# Patient Record
Sex: FEMALE | Race: BLACK OR AFRICAN AMERICAN
[De-identification: names, ages, dates, MRNs, and addresses within clinical notes are randomized per-mention and may not be internally consistent; named-entity substitution may affect disease eponyms.]

---

## 2017-01-18 ENCOUNTER — HOSPITAL ENCOUNTER (INPATIENT)
Dept: HOSPITAL 61 - ER | Age: 52
LOS: 1 days | Discharge: HOME | DRG: 640 | End: 2017-01-19
Attending: INTERNAL MEDICINE | Admitting: INTERNAL MEDICINE
Payer: MEDICARE

## 2017-01-18 VITALS — DIASTOLIC BLOOD PRESSURE: 79 MMHG | SYSTOLIC BLOOD PRESSURE: 158 MMHG

## 2017-01-18 VITALS — SYSTOLIC BLOOD PRESSURE: 135 MMHG | DIASTOLIC BLOOD PRESSURE: 70 MMHG

## 2017-01-18 VITALS — DIASTOLIC BLOOD PRESSURE: 71 MMHG | SYSTOLIC BLOOD PRESSURE: 122 MMHG

## 2017-01-18 VITALS — SYSTOLIC BLOOD PRESSURE: 160 MMHG | DIASTOLIC BLOOD PRESSURE: 70 MMHG

## 2017-01-18 VITALS — DIASTOLIC BLOOD PRESSURE: 70 MMHG | SYSTOLIC BLOOD PRESSURE: 174 MMHG

## 2017-01-18 VITALS — SYSTOLIC BLOOD PRESSURE: 170 MMHG | DIASTOLIC BLOOD PRESSURE: 70 MMHG

## 2017-01-18 VITALS — DIASTOLIC BLOOD PRESSURE: 70 MMHG | SYSTOLIC BLOOD PRESSURE: 166 MMHG

## 2017-01-18 VITALS — SYSTOLIC BLOOD PRESSURE: 178 MMHG | DIASTOLIC BLOOD PRESSURE: 72 MMHG

## 2017-01-18 VITALS — SYSTOLIC BLOOD PRESSURE: 154 MMHG | DIASTOLIC BLOOD PRESSURE: 79 MMHG

## 2017-01-18 VITALS — SYSTOLIC BLOOD PRESSURE: 130 MMHG | DIASTOLIC BLOOD PRESSURE: 60 MMHG

## 2017-01-18 VITALS — SYSTOLIC BLOOD PRESSURE: 122 MMHG | DIASTOLIC BLOOD PRESSURE: 60 MMHG

## 2017-01-18 VITALS — SYSTOLIC BLOOD PRESSURE: 98 MMHG | DIASTOLIC BLOOD PRESSURE: 55 MMHG

## 2017-01-18 VITALS — DIASTOLIC BLOOD PRESSURE: 70 MMHG | SYSTOLIC BLOOD PRESSURE: 150 MMHG

## 2017-01-18 VITALS — BODY MASS INDEX: 27.42 KG/M2 | WEIGHT: 145.25 LBS | HEIGHT: 61 IN

## 2017-01-18 VITALS — DIASTOLIC BLOOD PRESSURE: 69 MMHG | SYSTOLIC BLOOD PRESSURE: 113 MMHG

## 2017-01-18 VITALS — DIASTOLIC BLOOD PRESSURE: 74 MMHG | SYSTOLIC BLOOD PRESSURE: 126 MMHG

## 2017-01-18 VITALS — DIASTOLIC BLOOD PRESSURE: 72 MMHG | SYSTOLIC BLOOD PRESSURE: 133 MMHG

## 2017-01-18 VITALS — SYSTOLIC BLOOD PRESSURE: 105 MMHG | DIASTOLIC BLOOD PRESSURE: 58 MMHG

## 2017-01-18 VITALS — SYSTOLIC BLOOD PRESSURE: 117 MMHG | DIASTOLIC BLOOD PRESSURE: 59 MMHG

## 2017-01-18 VITALS — DIASTOLIC BLOOD PRESSURE: 61 MMHG | SYSTOLIC BLOOD PRESSURE: 116 MMHG

## 2017-01-18 VITALS — SYSTOLIC BLOOD PRESSURE: 137 MMHG | DIASTOLIC BLOOD PRESSURE: 66 MMHG

## 2017-01-18 VITALS — SYSTOLIC BLOOD PRESSURE: 181 MMHG | DIASTOLIC BLOOD PRESSURE: 75 MMHG

## 2017-01-18 VITALS — SYSTOLIC BLOOD PRESSURE: 123 MMHG | DIASTOLIC BLOOD PRESSURE: 60 MMHG

## 2017-01-18 DIAGNOSIS — E87.2: ICD-10-CM

## 2017-01-18 DIAGNOSIS — Z82.49: ICD-10-CM

## 2017-01-18 DIAGNOSIS — R00.1: ICD-10-CM

## 2017-01-18 DIAGNOSIS — D64.9: ICD-10-CM

## 2017-01-18 DIAGNOSIS — M06.9: ICD-10-CM

## 2017-01-18 DIAGNOSIS — F41.9: ICD-10-CM

## 2017-01-18 DIAGNOSIS — Z99.2: ICD-10-CM

## 2017-01-18 DIAGNOSIS — I50.9: ICD-10-CM

## 2017-01-18 DIAGNOSIS — I25.10: ICD-10-CM

## 2017-01-18 DIAGNOSIS — I13.2: ICD-10-CM

## 2017-01-18 DIAGNOSIS — E78.00: ICD-10-CM

## 2017-01-18 DIAGNOSIS — E87.5: Primary | ICD-10-CM

## 2017-01-18 DIAGNOSIS — N18.6: ICD-10-CM

## 2017-01-18 DIAGNOSIS — E78.5: ICD-10-CM

## 2017-01-18 DIAGNOSIS — E83.39: ICD-10-CM

## 2017-01-18 DIAGNOSIS — F32.9: ICD-10-CM

## 2017-01-18 DIAGNOSIS — K21.9: ICD-10-CM

## 2017-01-18 LAB
ALBUMIN SERPL-MCNC: 3.1 G/DL (ref 3.4–5)
ALBUMIN/GLOB SERPL: 0.6 {RATIO} (ref 1–1.7)
ALP SERPL-CCNC: 93 U/L (ref 46–116)
ALT SERPL-CCNC: 139 U/L (ref 14–59)
ANION GAP SERPL CALC-SCNC: 15 MMOL/L (ref 6–14)
ANION GAP SERPL CALC-SCNC: 17 MMOL/L (ref 6–14)
AST SERPL-CCNC: 194 U/L (ref 15–37)
BASOPHILS # BLD AUTO: 0 X10^3/UL (ref 0–0.2)
BASOPHILS NFR BLD: 1 % (ref 0–3)
BILIRUB SERPL-MCNC: 0.9 MG/DL (ref 0.2–1)
BUN SERPL-MCNC: 145 MG/DL (ref 7–20)
BUN SERPL-MCNC: 45 MG/DL (ref 7–20)
BUN/CREAT SERPL: 10 (ref 6–20)
CALCIUM SERPL-MCNC: 9.1 MG/DL (ref 8.5–10.1)
CALCIUM SERPL-MCNC: 9.3 MG/DL (ref 8.5–10.1)
CHLORIDE SERPL-SCNC: 92 MMOL/L (ref 98–107)
CHLORIDE SERPL-SCNC: 99 MMOL/L (ref 98–107)
CK SERPL-CCNC: 49 U/L (ref 26–192)
CKMB INDEX: 2.4 % (ref 0–4)
CKMB MASS: 1.2 NG/ML (ref 0–3.6)
CO2 SERPL-SCNC: 20 MMOL/L (ref 21–32)
CO2 SERPL-SCNC: 32 MMOL/L (ref 21–32)
CREAT SERPL-MCNC: 14.4 MG/DL (ref 0.6–1)
CREAT SERPL-MCNC: 5.6 MG/DL (ref 0.6–1)
EOSINOPHIL NFR BLD: 1 % (ref 0–3)
ERYTHROCYTE [DISTWIDTH] IN BLOOD BY AUTOMATED COUNT: 14.2 % (ref 11.5–14.5)
GFR SERPLBLD BASED ON 1.73 SQ M-ARVRAT: 3.2 ML/MIN
GFR SERPLBLD BASED ON 1.73 SQ M-ARVRAT: 9.6 ML/MIN
GLOBULIN SER-MCNC: 5.3 G/DL (ref 2.2–3.8)
GLUCOSE SERPL-MCNC: 137 MG/DL (ref 70–99)
GLUCOSE SERPL-MCNC: 66 MG/DL (ref 70–99)
HCT VFR BLD CALC: 36 % (ref 36–47)
HGB BLD-MCNC: 11.5 G/DL (ref 12–15.5)
LYMPHOCYTES # BLD: 0.9 X10^3/UL (ref 1–4.8)
LYMPHOCYTES NFR BLD AUTO: 16 % (ref 24–48)
MAGNESIUM SERPL-MCNC: 2.9 MG/DL (ref 1.8–2.4)
MCH RBC QN AUTO: 31 PG (ref 25–35)
MCHC RBC AUTO-ENTMCNC: 32 G/DL (ref 31–37)
MCV RBC AUTO: 95 FL (ref 79–100)
MONOCYTES NFR BLD: 6 % (ref 0–9)
NEUTROPHILS NFR BLD AUTO: 76 % (ref 31–73)
OBC FLU: (no result)
PLATELET # BLD AUTO: 342 X10^3/UL (ref 140–400)
POTASSIUM SERPL-SCNC: 3 MMOL/L (ref 3.5–5.1)
POTASSIUM SERPL-SCNC: 9.2 MMOL/L (ref 3.5–5.1)
PROT SERPL-MCNC: 8.4 G/DL (ref 6.4–8.2)
RBC # BLD AUTO: 3.77 X10^6/UL (ref 3.5–5.4)
SODIUM SERPL-SCNC: 136 MMOL/L (ref 136–145)
SODIUM SERPL-SCNC: 139 MMOL/L (ref 136–145)
WBC # BLD AUTO: 5.7 X10^3/UL (ref 4–11)

## 2017-01-18 PROCEDURE — 80069 RENAL FUNCTION PANEL: CPT

## 2017-01-18 PROCEDURE — 80048 BASIC METABOLIC PNL TOTAL CA: CPT

## 2017-01-18 PROCEDURE — 85027 COMPLETE CBC AUTOMATED: CPT

## 2017-01-18 PROCEDURE — 90732 PPSV23 VACC 2 YRS+ SUBQ/IM: CPT

## 2017-01-18 PROCEDURE — 87804 INFLUENZA ASSAY W/OPTIC: CPT

## 2017-01-18 PROCEDURE — 71010: CPT

## 2017-01-18 PROCEDURE — 36415 COLL VENOUS BLD VENIPUNCTURE: CPT

## 2017-01-18 PROCEDURE — 84484 ASSAY OF TROPONIN QUANT: CPT

## 2017-01-18 PROCEDURE — 83735 ASSAY OF MAGNESIUM: CPT

## 2017-01-18 PROCEDURE — 80053 COMPREHEN METABOLIC PANEL: CPT

## 2017-01-18 PROCEDURE — 82553 CREATINE MB FRACTION: CPT

## 2017-01-18 PROCEDURE — 93005 ELECTROCARDIOGRAM TRACING: CPT

## 2017-01-18 PROCEDURE — 83880 ASSAY OF NATRIURETIC PEPTIDE: CPT

## 2017-01-18 PROCEDURE — 87641 MR-STAPH DNA AMP PROBE: CPT

## 2017-01-18 RX ADMIN — AMLODIPINE BESYLATE SCH MG: 10 TABLET ORAL at 09:49

## 2017-01-18 RX ADMIN — CALCIUM ACETATE SCH MG: 667 CAPSULE ORAL at 12:58

## 2017-01-18 RX ADMIN — CITALOPRAM HYDROBROMIDE SCH MG: 20 TABLET ORAL at 20:38

## 2017-01-18 RX ADMIN — CITALOPRAM HYDROBROMIDE SCH MG: 20 TABLET ORAL at 09:50

## 2017-01-18 RX ADMIN — PANTOPRAZOLE SODIUM SCH MG: 40 TABLET, DELAYED RELEASE ORAL at 09:50

## 2017-01-18 RX ADMIN — CLONIDINE HYDROCHLORIDE SCH MG: 0.3 TABLET ORAL at 20:38

## 2017-01-18 RX ADMIN — CALCIUM ACETATE SCH MG: 667 CAPSULE ORAL at 09:45

## 2017-01-18 RX ADMIN — CLOPIDOGREL BISULFATE SCH MG: 75 TABLET ORAL at 09:50

## 2017-01-18 RX ADMIN — ASPIRIN 81 MG CHEWABLE TABLET SCH MG: 81 TABLET CHEWABLE at 09:51

## 2017-01-18 RX ADMIN — GABAPENTIN SCH MG: 100 CAPSULE ORAL at 20:38

## 2017-01-18 RX ADMIN — CLONIDINE HYDROCHLORIDE SCH MG: 0.3 TABLET ORAL at 09:48

## 2017-01-18 RX ADMIN — ISOSORBIDE MONONITRATE SCH MG: 60 TABLET, EXTENDED RELEASE ORAL at 09:49

## 2017-01-18 RX ADMIN — LISINOPRIL SCH MG: 20 TABLET ORAL at 20:38

## 2017-01-18 RX ADMIN — LISINOPRIL SCH MG: 20 TABLET ORAL at 14:27

## 2017-01-18 RX ADMIN — CLONIDINE HYDROCHLORIDE SCH MG: 0.3 TABLET ORAL at 14:27

## 2017-01-18 RX ADMIN — CALCIUM ACETATE SCH MG: 667 CAPSULE ORAL at 18:35

## 2017-01-18 RX ADMIN — GABAPENTIN SCH MG: 100 CAPSULE ORAL at 09:51

## 2017-01-18 RX ADMIN — LISINOPRIL SCH MG: 20 TABLET ORAL at 09:50

## 2017-01-18 NOTE — PHYS DOC
Past Medical History


Past Medical History:  Anxiety, CAD, CHF, Depression, GERD, High Cholesterol, 

Hypertension


Additional Past Medical Histor:  HD, constipation


Past Surgical History:  Other


Additional Past Surgical Histo:  fistula to right arm


Alcohol Use:  None


Drug Use:  None





Adult General


Chief Complaint


Chief Complaint:  DIZZY/LIGHT HEADED





HPI


HPI


Patient is a 51  year old female who presents with dizziness and 

lightheadedness. The patient states that her symptoms started earlier today. 

Patient states that she has become severely lightheaded and is currently unable 

to stand up and walk because of her symptoms. Patient is also having associated 

nausea and vomiting. Patient has history of hypertension, coronary artery 

disease, and congestive heart failure. Patient also has history of end-stage 

renal failure and is currently on dialysis. Patient states that she last went 

to dialysis 4 days ago. Patient normally goes to dialysis on Monday, Wednesday, 

and Friday. Patient denies any fevers. Patient is currently on lisinopril and 

clonidine for her blood pressure. Patient states that her blood pressure has 

been running lower than usual over the past 24 hours. Patient states that she 

is having mild chest pain at this moment but that this was not present at 

symptom onset.





Review of Systems


Review of Systems





Constitutional: Lightheadedness, generalized weakness, Denies fever or chills []


Eyes: Denies change in visual acuity, redness, or eye pain []


HENT: Denies nasal congestion or sore throat []


Respiratory: Denies cough or shortness of breath []


Cardiovascular: Chest pain []


GI: Nausea, vomiting, denies abdominal pain bloody stools or diarrhea []


: Denies dysuria or hematuria []


Musculoskeletal: Denies back pain or joint pain []


Integument: Denies rash or skin lesions []


Neurologic: Denies headache, focal weakness or sensory changes []


Endocrine: Denies polyuria or polydipsia []





Current Medications


Current Medications





 Current Medications








 Medications


  (Trade)  Dose


 Ordered  Sig/Hugo  Start Time


 Stop Time Status Last Admin


Dose Admin


 


 Calcium Chloride  333 mg  1X  ONCE  1/18/17 01:00


 1/18/17 01:01 DC 1/18/17 01:09


333 MG


 


 Dextrose  25 gm  1X  ONCE  1/18/17 01:45


 1/18/17 01:46 DC 1/18/17 01:47


25 GM


 


 Insulin Human


 Regular


  (Novolin R Vial)  10 unit  1X  ONCE  1/18/17 01:45


 1/18/17 01:46 DC 1/18/17 01:56


10 UNIT


 


 Ondansetron HCl


  (Zofran)  4 mg  1X  ONCE  1/18/17 01:45


 1/18/17 01:46 DC 1/18/17 00:53


4 MG


 


 Ondansetron HCl 4


 mg  4 mg  STK-MED ONCE  1/18/17 00:52


 1/18/17 00:53 DC  


 


 


 Sodium


 Polystyrene


 Sulfonate


  (Kayexalate)  30 gm  1X  ONCE  1/18/17 01:45


 1/18/17 01:46 DC 1/18/17 01:47


30 GM


 


 Sodium Bicarbonate  50 meq  1X  ONCE  1/18/17 01:45


 1/18/17 01:46 DC 1/18/17 01:47


50 MEQ


 


 Sodium Chloride


  (Iv Sodium


 Chloride 0.9%


 1000ml Bag)  1,000 ml @ 


 60 mls/hr  [id number]  1/18/17 01:00


 1/18/17 17:39  1/18/17 01:01


60 MLS/HR











Allergies


Allergies





 Allergies








Coded Allergies Type Severity Reaction Last Updated Verified


 


  No Known Drug Allergies    10/20/16 No











Physical Exam


Physical Exam





Constitutional: Alert, afebrile, appears ill. []


HENT: Normocephalic, atraumatic, bilateral external ears normal, oropharynx 

moist, no oral exudates, nose normal. []


Eyes: PERRLA, EOMI, conjunctiva normal, no discharge. [] 


Neck: Normal range of motion, no tenderness, supple, no stridor. [] 


Cardiovascular: Bradycardia, regular rhythm, no murmur []


Lungs & Thorax:  Bilateral breath sounds clear to auscultation []


Abdomen: Bowel sounds normal, soft, no tenderness, no masses, no pulsatile 

masses. [] 


Skin: Warm, dry, no erythema, no rash. [] 


Back: No tenderness, no CVA tenderness. [] 


Extremities: No tenderness, no cyanosis, no clubbing, ROM intact, trace edema 

bilaterally. [] 


Neurologic: Alert and oriented X 3, normal motor function, normal sensory 

function, no focal deficits noted. []





Current Patient Data


Vital Signs





 Vital Signs








  Date Time  Temp Pulse Resp B/P Pulse Ox O2 Delivery O2 Flow Rate FiO2


 


1/18/17 00:36 97.6 61 16 106/55 96 Room Air  





 97.6       








Lab Values





 Laboratory Tests








Test


  1/18/17


01:12


 


White Blood Count


  5.7x10^3/uL


(4.0-11.0)


 


Red Blood Count


  3.77x10^6/uL


(3.50-5.40)


 


Hemoglobin


  11.5g/dL


(12.0-15.5)  L


 


Hematocrit


  36.0%


(36.0-47.0)


 


Mean Corpuscular Volume 95fL ()  


 


Mean Corpuscular Hemoglobin 31pg (25-35)  


 


Mean Corpuscular Hemoglobin


Concent 32g/dL (31-37)


 


 


Red Cell Distribution Width


  14.2%


(11.5-14.5)


 


Platelet Count


  342x10^3/uL


(140-400)


 


Neutrophils (%) (Auto) 76% (31-73)  H


 


Lymphocytes (%) (Auto) 16% (24-48)  L


 


Monocytes (%) (Auto) 6% (0-9)  


 


Eosinophils (%) (Auto) 1% (0-3)  


 


Basophils (%) (Auto) 1% (0-3)  


 


Neutrophils # (Auto)


  4.3x10^3uL


(1.8-7.7)


 


Lymphocytes # (Auto)


  0.9x10^3/uL


(1.0-4.8)  L


 


Monocytes # (Auto)


  0.3x10^3/uL


(0.0-1.1)


 


Eosinophils # (Auto)


  0.1x10^3/uL


(0.0-0.7)


 


Basophils # (Auto)


  0.0x10^3/uL


(0.0-0.2)


 


Sodium Level


  136mmol/L


(136-145)


 


Potassium Level


  9.2mmol/L


(3.5-5.1)  *H


 


Chloride Level


  99mmol/L


()


 


Carbon Dioxide Level


  20mmol/L


(21-32)  L


 


Anion Gap 17 (6-14)  H


 


Blood Urea Nitrogen


  145mg/dL


(7-20)  H


 


Creatinine


  14.4mg/dL


(0.6-1.0)  H


 


Estimated GFR


(Cockcroft-Gault) 3.2  


 


 


BUN/Creatinine Ratio 10 (6-20)  


 


Glucose Level


  137mg/dL


(70-99)  H


 


Calcium Level


  9.3mg/dL


(8.5-10.1)


 


Magnesium Level


  2.9mg/dL


(1.8-2.4)  H


 


Total Bilirubin


  0.9mg/dL


(0.2-1.0)


 


Aspartate Amino Transferase


(AST) 194U/L (15-37)


H


 


Alanine Aminotransferase (ALT)


  139U/L (14-59)


H


 


Alkaline Phosphatase


  93U/L ()


 


 


Creatine Kinase


  49U/L ()


 


 


Creatine Kinase MB (Mass)


  1.2ng/mL


(0.0-3.6)


 


Creatine Kinase MB Relative


Index 2.4% (0-4)  


 


 


Troponin I Quantitative


  < 0.017ng/mL


(0.000-0.055)


 


NT-Pro-B-Type Natriuretic


Peptide 29970ee/mL


(0-124)  H


 


Total Protein


  8.4g/dL


(6.4-8.2)  H


 


Albumin


  3.1g/dL


(3.4-5.0)  L


 


Albumin/Globulin Ratio


  0.6 (1.0-1.7)


L





 Laboratory Tests


1/18/17 01:12








 Laboratory Tests


1/18/17 01:12











EKG


EKG


Interpreted by me: Heart rate 45, junctional rhythm, peaked T waves present, no 

acute ST elevations or depressions []





Radiology/Procedures


Radiology/Procedures


One view AP chest x-ray interpreted by me: Pulmonary vascular congestion, small 

bilateral pleural effusions, cardiomegaly []





Course & Med Decision Making


Course & Med Decision Making


Pertinent Labs and Imaging studies reviewed. (See chart for details)





Patient appears acutely ill and patient's EKG is concerning for possible 

hyperkalemia given patient's history of end-stage renal disease and missing 

dialysis. For this reason the patient was given calcium chloride to help 

stabilize her myocardium. Upon confirmation of patient's hyperkalemia, the 

patient was also given IV sodium bicarbonate, insulin, D50, and oral 

Kayexalate. I contacted Dr. León who accepted care patient in hospital. I 

also spoke with Dr. Cronin of nephrology who will make plans to have patient 

received dialysis emergently tonight. Patient admitted to ICU.





Dragon Disclaimer


Dragon Disclaimer


This electronic medical record was generated, in whole or in part, using a 

voice recognition dictation system.





Departure


Departure


Impression:  


 Primary Impression:  


 Hyperkalemia


 Additional Impressions:  


 ESRD (end stage renal disease)


 Bradycardia


Disposition:  09 ADMITTED AS INPATIENT


Admitting Physician:  Nara León


Condition:  GUARDED


Referrals:  


ANEESH BRISCOE (PCP)





Problem Qualifiers








AVA BOOKER MD Jan 18, 2017 01:00

## 2017-01-18 NOTE — HP
ADMIT DATE:  01/18/2017



LOCATION:  103.



REASON FOR ADMISSION TO THE HOSPITAL:  Hyperkalemia, end-stage renal disease on

hemodialysis.  The patient missed dialysis on Monday.



HISTORY OF PRESENT ILLNESS:  The patient is a 51-year-old female, patient of Dr. Boyer.  She has hemodialysis for couple of years, goes to dialysis Monday,

Wednesday and Friday at dialysis ctr at  [facility].  She and the staff had 
some problem

on Monday and she was mad at the staff and she did not do her dialysis and then

she came in to the Emergency Room and complaints of lightheaded and dizziness

and she was bradycardic with junctional rhythm and she has peaked T-waves on the

EKG.  Potassium was high at 9.3.  The patient was given Kayexalate and Renal was

consulted and emergency dialysis was done and potassium is 3.0 after dialysis.



PAST MEDICAL HISTORY:  History of congestive heart failure, depression, GERD,

cholesterol, hypertension, renal failure, and hemodialysis.



PAST SURGICAL HISTORY:  Had a fistula in the right arm.  Denies other major

surgeries.



ALLERGIES:  No known drug allergies.



MEDICATIONS AT HOME:  The patient is on Flexeril 5 mg twice a day, folic acid 1

daily, loratadine 10 mg daily, Zofran for nausea 8 mg, amlodipine 10 mg daily,

aspirin 81 mg daily, atorvastatin 20 mg daily, calcium acetate 667 mg 3 times

daily, citalopram 20 mg daily, clonidine 0.3 three times a day, Plavix 75 mg

daily, gabapentin 100 mg 3 times daily, hydrocodone 1 q. 6, isosorbide 60 mg

daily, lisinopril 20 mg 3 times daily, Protonix 40 mg daily, senna 1 daily, and

B complex one daily.



FAMILY HISTORY:  Positive for hypertension, heart disease, kidney problems.



SOCIAL HISTORY:  The patient denies history of smoking, alcohol or drug abuse.



REVIEW OF SYSTEMS:

CARDIAC:  No chest pain.

LUNGS:  Some short of breath.

GASTROINTESTINAL:  Has some nausea, dizziness.  Rest of the 14-system was

reviewed and negative.



PHYSICAL EXAMINATION:

GENERAL:  The patient is not in any distress.  Pleasant today.

VITAL SIGNS:  Temperature 97, pulse 61, respirations 16, blood pressure 106/55,

and 96 on room air.

HEENT:  Head is atraumatic.  Pupils equal.  Oral cavity:  No congestion.

NECK:  Supple.  Thyroid not enlarged.  JVD not elevated.

CHEST:  Symmetrical.

CARDIOVASCULAR:  S1, S2.

LUNGS:  Clear to auscultation.

ABDOMEN:  Soft, bowel sounds present, no mass palpable.

EXTERNAL GENITALIA:  No Serrato.

RECTAL:  Deferred.

EXTREMITIES:  No calf tenderness, no edema.  Pulses 1+.

NEUROLOGIC:  Cranial nerves intact.  Power 5/5 in all extremities.  The patient

has AV shunt in the right upper extremity and thrill is present.  Neurologic

exam is normal.

SKIN:  Normal.



LABORATORY DATA:  Shows a white count of 5.7, hemoglobin 11.5, platelets 334. 

Electrolytes show sodium 136, potassium 9.2, chloride 99, bicarbonate 20, BUN

145, creatinine 4.4, glucose 93.  BNP was 18,037.  Chest x-ray, cardiomegaly. 

EKG shows a junctional rhythm with peaked T waves.



FINAL IMPRESSION:

1.  Hyperkalemia, acute.

2.  End-stage renal disease on Dialysis.

3.  Bradycardia with junctional rhythm secondary to hyperkalemia.

4.  Hypertension.

5.  History of congestive heart failure, sees Cardiology at , Dr. Arauz.

6.  Hypertension.

7.  Hyperlipidemia.



PLAN:  At this time, admit to Hospital Emergency dialysis, was given Kayexalate

and heart rate is 70, normal sinus rhythm on the monitor, repeat EKG and see how

she does.  I spoke with renal should be able to get dialyzed again tomorrow,

able to discharge and the then do patient, Monday, Wednesday, and Friday. 

Nutrition consult for renal diet.

 



______________________________

KAROLINA ENRIQUE MD



DR:  ANDREW/argentina  JOB#:  [id number] / [id number]

DD:  01/18/2017 09:29  DT:  01/18/2017 11:39

NAS

## 2017-01-18 NOTE — ACF
Admission Forms Criteria


 HYPONATREMIA; HYPERNATREMIA; HYPOKALEMIA; 


 HYPERKALEMIA; HYPOCALCEMIA; HYPERCALCEMIA





Clinical Indications for Inpatient Care





                                                            (Place 'X' for any 

and all applicable criteria):





Ongoing inpatient care may be indicated for ANY ONE of the following [G](1)(2)(3

)(5):





[ ]I.    Hyponatremia with ANY ONE of the following:


         [ ]a)  Sodium less than 130 mEq/L (mmol/L) (new) (6)(22)


         [ ]b)  Sodium less than 135 mEq/L (mmol/L) with ANY ONE of the 

following:


                [ ]i)    Severe medical etiology requiring inpatient management 

(eg, heart failure, hypovolemia)


                [ ]ii)   Altered mental status     


                [ ]iii)  Seizures


[ ]II.    Hypernatremia with ANY ONE of the following:


          [ ]a) Sodium greater than 155 mEq/L (mmol/L)


          [ ]b) Sodium greater than 150 mEq/L (mmol/L) with ANY ONE of the 

following:


                [ ] i)   Altered mental status 


                [ ]ii)   Seizures


                [ ]iii)  Severe medical etiology (eg, hypovolemia, diabetes 

insipidus)


                [ ]iv)  Severe weakness


                [ ]v)   Severe medical etiology (eg, hemolysis, infection, drug 

overdose) 


[ ]III.  Hypokalemia with ANY ONE of the following:


        [ ]a)  Potassium less than 2.5 mEq/L (mmol/L) despite outpatient and 

emergency treatment 


        [ ]b)  Potassium less than 3.0 mEq/L (mmol/L) with ANY ONE of the 

following:


                [ ]i)    Weakness


                [ ]ii)   Cardiac abnormality (eg, arrhythmia, conduction 

disturbance)


                [ ]iii)  Cardiac ischemia 


                [ ]iv)   Ileus


                [ ]v)   Ongoing medical cause requiring inpatient management. (

e.g., acute renal wasting, SIADH)


                [ ]vi)  Other severe symptoms      


[X] IV. Hyperkalemia with ANY ONE of the following:


         [X]a)  Potassium greater than 6.5 mEq/L (mmol/L)


         [ ]b)  Potassium greater than 5 mEq/L (mmol/L) with  ANY ONE of the 

following:


                [ ]i)    Severe ECG findings [H]


                [ ]ii)   Acute worsening of renal failure (creatinine greater 

than 2.5 mg/dL (221 micromoles/L) 


                        or significant elevation for age and size)


[ ] V.  Hypocalcemia with ANY ONE of the following:


         [ ]a)  Calcium less than 7 mg/dL (1.75 mmol/L) despite outpatient and 

emergency treatment(19)


         [ ]b)  Calcium less than 8 mg/dL (2 mmol/L) with significant symptoms 

or findings; examples include:


                [ ]i)     Cardiac abnormality (eg, arrhythmia or conduction 

disturbance)


                [ ]ii)    Altered mental status 


                [ ]iii)   Seizures


                [ ]iv)   Breathing difficulty 


                [ ]v)    Muscle spasms


[ ]VI. Hypercalcemia with ANY ONE of the following:      


        [ ]a)  Calcium greater than 14 mg/dL (3.5 mmol/L)


        [ ]b)  Calcium greater than 12 mg/dL (3 mmol/L) with ANY ONE of the 

following:


                [ ]i) Significant dehydration or hypovolemia as indicated by 

ANY ONE of the following(2):


                      [ ]1. Clinically significant dehydration as indicated by 

ANY ONE of the following:   


                             [ ]A. Acute loss of weight from baseline (5% of 

body weight in adults, 9% in pediatric patients)


                             [ ]B. Hemodynamic instability


                             [ ]C. Acute renal failure


                             [ ]D. Serum sodium greater than 150 mEq/L (mmol/L)

               


                      [ ]2) Dehydration that is persistent indicated by ALL of 

the following:


                             [ ]A. Oral rehydration therapy not tolerated or 

insufficient to adequately correct dehydration


                             [ ]B. Appropriate intravenous treatment (eg, fluids

) does not readily correct dehydration ie, after 12 to 24 hours of treatment)


                [ ]ii) Significant symptoms or findings; examples include: 


                       [ ]1) Altered mental status


                       [ ]2) Cardiac abnormality (eg, arrhythmia, conduction 

disturbance)     


                       [ ]3) Cardiac abnormality (eg, arrhythmia, conduction 

disturbance)








The original [facility] content created by [facility] has been revised. 


The portions of  the content which have been revised are identified through the 

use of italic text or in bold, and [facility] 


has neither reviewed nor approved the modified material. All other unmodified 

content is copyright  [facility]    





Please see references footnoted in the original [facility] edition 

2016


Admission Criteria Met?:  Yes








JENY YOO Jan 18, 2017 04:02

## 2017-01-18 NOTE — EKG
[facility]

               [address] [place] [address]

Test Date:    2017               Test Time:    00:39:57

Pat Name:     ISATU WHEELER           Department:   

Patient ID:   [id number]           Room:         103 1

Gender:       F                        Technician:   

:          1965               Requested By: AVA BOOKER

Order Number: [id number]            Reading MD:   iMtali Hammer

                                 Measurements

Intervals                              Axis          

Rate:         45                       P:            

UT:                                    QRS:          -67

QRSD:         120                      T:            57

QT:           462                                    

QTc:          402                                    

                           Interpretive Statements

IRREGULAR RHYTHM, NO P-WAVE FOUN

LEFT ANTERIOR FASCICULAR BLOCK

INCOMPLETE RIGHT BUNDLE BRANCH BLOCK

CONSIDER RIGHT VENTRICULAR HYPERTROPHY

ABNORMAL ECG



Electronically Signed On 2017 0:27:08 CST by Mitali Hammer

## 2017-01-18 NOTE — RAD
Portable chest, 1/18/2017:



History: Chest pain, bradycardia



Comparison is made to a study from 9/27/2016. The patient is rotated to the

right. The heart is enlarged. There is mild upper lobe pulmonary venous

redistribution. No pulmonary infiltrate is seen. There is no evidence of

pleural fluid.



IMPRESSION: Cardiomegaly with borderline vascular congestion.

## 2017-01-18 NOTE — EKG
[facility]

              [address] [place] [address]

Test Date:    2017               Test Time:    10:45:57

Pat Name:     ISATU WHEELER           Department:   

Patient ID:   [id number]           Room:         103 1

Gender:       F                        Technician:   JAIME

:          1965               Requested By: KAROLINA ENRIQUE

Order Number: [id number]            Reading MD:   Phuc Keita

                                 Measurements

Intervals                              Axis          

Rate:         85                       P:            49

KS:           146                      QRS:          43

QRSD:         106                      T:            38

QT:           400                                    

QTc:          476                                    

                           Interpretive Statements

SINUS RHYTHM

LEFT ATRIAL ABNORMALITY

INCOMPLETE RIGHT BUNDLE BRANCH BLOCK

PROLONGED QT

ABNORMAL ECG

R

Electronically Signed On 2017 14:16:35 CST by Phuc Keita

## 2017-01-18 NOTE — PDOC
Provider Note


Provider Note


H&P dictated.


#[id number].


pt seen in ICU .


spoke with renal  and dialysis RN








KAROLINA ENRIQUE MD Jan 18, 2017 09:30

## 2017-01-18 NOTE — PDOC2
DATE OF CONSULT


Date of Consult


01/18/2017





REASON FOR CONSULT


Reason for Consult


ESRD and Critical HyperKalemia





REFERRING PHYSICIAN


Referring Provider


[facility] and Dr León





CHIEF COMPLAINT


Chief Complaint


 Problems


Medical Problems:


(1) Bradycardia


Status: Acute  





(2) ESRD (end stage renal disease)


Status: Acute  





(3) Hyperkalemia


Status: Acute  








SOURCE


Source


Pt and EMR





HPI


HPI


as dictated





PMH


PMH


PAST MEDICAL HISTORY: Congestive heart failure with cardiomyopathy, EF not


known, coronary artery disease, dialysis fistula creation, hyperlipidemia,


hypertension, ESRD dialysis, and rheumatoid arthritis in the past. Most recent


echocardiogram report is in 2014. It shows an EF of 55% to 60%.





SOCIAL HISTORY: She lives by herself but cares for 4 kids. She is a nonsmoker 

and nondrinker. Denies


drug use.





FAMILY HISTORY: Negative for known kidney problems that she admits to.





CURRENT MEDICATIONS


Current Meds





Current Medications








 Medications


  (Trade)  Dose


 Ordered  Sig/Hugo


 Route


 PRN Reason  Start Time


 Stop Time Status Last Admin


Dose Admin


 


 Calcium Chloride


 333 mg  333 mg  1X  ONCE


 IV


   1/18/17 01:00


 1/18/17 01:01 DC 1/18/17 01:09


 


 


 Sodium Chloride


  (Iv Sodium


 Chloride 0.9%


 1000ml Bag)  1,000 ml @ 


 60 mls/hr  [id number]


 IV


   1/18/17 01:00


 1/18/17 17:39  1/18/17 01:01


 


 


 Sodium Bicarbonate  50 meq  1X  ONCE


 IV


   1/18/17 01:45


 1/18/17 01:46 DC 1/18/17 01:47


 


 


 Dextrose  25 gm  1X  ONCE


 IV


   1/18/17 01:45


 1/18/17 01:46 DC 1/18/17 01:47


 


 


 Insulin Human


 Regular


  (Novolin R Vial)  10 unit  1X  ONCE


 IV


   1/18/17 01:45


 1/18/17 01:46 DC 1/18/17 01:56


 


 


 Sodium


 Polystyrene


 Sulfonate


  (Kayexalate)  30 gm  1X  ONCE


 PO


   1/18/17 01:45


 1/18/17 01:46 DC 1/18/17 01:47


 


 


 Ondansetron HCl


  (Zofran)  4 mg  1X  ONCE


 IV


   1/18/17 01:45


 1/18/17 01:46 DC 1/18/17 00:53


 








Home Meds


reviewed as documented





ALLERGIES


Allergies:  


Coded Allergies:  


     No Known Drug Allergies (Unverified , 10/20/16)





ROS


ROS


   GEN:      no Fevers      no Chills   + Weakness (now better)


   EYES:      no Visual Complaints


   ENT:      no EN Drainage      no Hearing deficits


   CVS:      no Orthopnea      no current  CP


   RESP:      no SOB      no DENNY


   GI:      no Nausea      no Vomiting


   :      no Dysuria      no Urgency


   HEME:      no easy bruising      no Palp Ly Nodes


   NEURO      no Focal Weakness   no Sz


   PSYCH:   no Suicidal Ideation   no Depression


   SKIN:      no Rashes


   ENDO:      no Polyuria or Polydipsia   no Hot/Cold Intolerance


   MU SK:      occ Arthralgia      no Myalgia





VITAL SIGNS


Vital Signs





 VS - Last 72 Hours, by Label








  Date Time  Temp Pulse Resp B/P Pulse Ox O2 Delivery O2 Flow Rate FiO2


 


1/18/17 09:03  80 18 170/70 98 Room Air  


 


1/18/17 08:06      Room Air  


 


1/18/17 08:01 98.7 80  174/70 100 Room Air  





 98.7       


 


1/18/17 07:22  80 20 160/70 100 Room Air  


 


1/18/17 06:00  81 16 166/70 99 Room Air  


 


1/18/17 05:00  74 18 150/70 99 Room Air  


 


1/18/17 04:45  72 18 154/79 99 Room Air  


 


1/18/17 04:30  70 18 158/79 99 Room Air  


 


1/18/17 04:15  59 18 105/58 98 Room Air  


 


1/18/17 04:00      Room Air  


 


1/18/17 04:00 97.9 42 18 98/55 97 Room Air  





 97.9       


 


1/18/17 03:37  44 16 150/63 96 Room Air  


 


1/18/17 02:43  44 16 112/55 96 Room Air  


 


1/18/17 02:28  44 16 95/53 96 Room Air  


 


1/18/17 02:13  50 16 119/61 96 Room Air  


 


1/18/17 01:58  76 16 149/67 96 Room Air  


 


1/18/17 01:43  40 16 179/82 96 Room Air  


 


1/18/17 01:28  48 16 103/68 96 Room Air  


 


1/18/17 01:13  64 16 101/56 96 Room Air  


 


1/18/17 00:36 97.6 61 16 106/55 96 Room Air  





 97.6       


 


1/18/17 00:36 97.6 61 16 106/55 96 Room Air  





 97.6       











PHYSICAL EXAM


Physical Exam


General Appearance:       Awake       Alert Oriented x  3   In  min  Distress


Eyes:       VIsion Unchanged Conjunctiva Normal


EN:       No EN Drainage  Mucous Memb.   moist; Halitosis 


Neck:         no  JVD   min  JVP  Supple   no  Thyromegaly


CVS:       S1 S2   +  Murmur  No Gallop  No Rub   no Edema


Resp:        no  Rales   no  Rhonchi   no  Acc. Muscle use


GI:       BS +ve    NO Bruit   Non Tender   Non Distended


:        no  CVA tenderness;    no  Suprapubic Tenderness


SKIN:        no  Rashes  Breast Exam deferred


Mu.Sk:       Adequate ROM    no  Muscle Atrophy


Heme:       Unable to palpate Obvious LAD  no palp   Splenomegaly


NEURO:       Good Strength and Tone   Cranial Nerves II - XII grossly intact


Psych:        ?  Depressed    no  Active hallucination











ASSESSMENT/PLAN


Assessment/Plan


ESRD :   emergent Dialysis  done earlier today as ordered; Next HD in am 





Critical HyperKalemia with Bradyarrhythmia - resolved quickly with initiation 

of HD;CK is WNL





mild Met Acidosis OA (WAG)  - will check  Phos, suspect due to missed HD





ANemia - hgb at 11.5 so not started EPO yet





? Fl Overload with Subj SOB - CXR noted - UF to Dry weight in setting of CHF (

may need challenge of Dry weight) 





 HTN:   Current BP meds reviewed.  Restart Home meds and reval 





H/o HyperPhos : check phos levels and alter binder regimen as needed





^ed LFT - defer to Dr León to eval; CK is WNL





Discussed Plan of Care and prognosis etc. at length with family.





LABS


Labs:





Laboratory Tests








Test


  1/18/17


00:57 1/18/17


01:12 1/18/17


01:22 1/18/17


08:03


 


Influenza Type A Antigen


  Negative


(NEGATIVE) 


  


  


 


 


Influenza Type B Antigen


  Negative


(NEGATIVE) 


  


  


 


 


White Blood Count


  


  5.7x10^3/uL


(4.0-11.0) 


  


 


 


Red Blood Count


  


  3.77x10^6/uL


(3.50-5.40) 


  


 


 


Hemoglobin


  


  11.5g/dL


(12.0-15.5) 


  


 


 


Hematocrit


  


  36.0%


(36.0-47.0) 


  


 


 


Mean Corpuscular Volume  95fL ()   


 


Mean Corpuscular Hemoglobin  31pg (25-35)   


 


Mean Corpuscular Hemoglobin


Concent 


  32g/dL (31-37) 


  


  


 


 


Red Cell Distribution Width


  


  14.2%


(11.5-14.5) 


  


 


 


Platelet Count


  


  342x10^3/uL


(140-400) 


  


 


 


Neutrophils (%) (Auto)  76% (31-73)   


 


Lymphocytes (%) (Auto)  16% (24-48)   


 


Monocytes (%) (Auto)  6% (0-9)   


 


Eosinophils (%) (Auto)  1% (0-3)   


 


Basophils (%) (Auto)  1% (0-3)   


 


Neutrophils # (Auto)


  


  4.3x10^3uL


(1.8-7.7) 


  


 


 


Lymphocytes # (Auto)


  


  0.9x10^3/uL


(1.0-4.8) 


  


 


 


Monocytes # (Auto)


  


  0.3x10^3/uL


(0.0-1.1) 


  


 


 


Eosinophils # (Auto)


  


  0.1x10^3/uL


(0.0-0.7) 


  


 


 


Basophils # (Auto)


  


  0.0x10^3/uL


(0.0-0.2) 


  


 


 


Sodium Level


  


  136mmol/L


(136-145) 


  139mmol/L


(136-145)


 


Chloride Level


  


  99mmol/L


() 


  92mmol/L


()


 


Carbon Dioxide Level


  


  20mmol/L


(21-32) 


  32mmol/L


(21-32)


 


Anion Gap  17 (6-14)   15 (6-14) 


 


Blood Urea Nitrogen


  


  145mg/dL


(7-20) 


  45mg/dL (7-20) 


 


 


Estimated GFR


(Cockcroft-Gault) 


  3.2 


  


  9.6 


 


 


BUN/Creatinine Ratio  10 (6-20)   


 


Glucose Level


  


  137mg/dL


(70-99) 


  66mg/dL


(70-99)


 


Calcium Level


  


  9.3mg/dL


(8.5-10.1) 


  9.1mg/dL


(8.5-10.1)


 


Total Bilirubin


  


  0.9mg/dL


(0.2-1.0) 


  


 


 


Aspartate Amino Transf


(AST/SGOT) 


  194U/L (15-37) 


  


  


 


 


Alkaline Phosphatase  93U/L ()   


 


Creatine Kinase  49U/L ()   


 


Creatine Kinase MB (Mass)


  


  1.2ng/mL


(0.0-3.6) 


  


 


 


Creatine Kinase MB Relative


Index 


  2.4% (0-4) 


  


  


 


 


Troponin I Quantitative


  


  < 0.017ng/mL


(0.000-0.055) 


  


 


 


Total Protein


  


  8.4g/dL


(6.4-8.2) 


  


 


 


Albumin


  


  3.1g/dL


(3.4-5.0) 


  


 


 


Albumin/Globulin Ratio  0.6 (1.0-1.7)   


 


Bedside Troponin I


  


  


  0.00ng/ml


(<0.08) 


 














CESARIO CHRISTOPHER MD Jan 18, 2017 09:22

## 2017-01-19 VITALS — SYSTOLIC BLOOD PRESSURE: 128 MMHG | DIASTOLIC BLOOD PRESSURE: 66 MMHG

## 2017-01-19 VITALS — SYSTOLIC BLOOD PRESSURE: 137 MMHG | DIASTOLIC BLOOD PRESSURE: 67 MMHG

## 2017-01-19 VITALS — SYSTOLIC BLOOD PRESSURE: 135 MMHG | DIASTOLIC BLOOD PRESSURE: 69 MMHG

## 2017-01-19 VITALS — SYSTOLIC BLOOD PRESSURE: 151 MMHG | DIASTOLIC BLOOD PRESSURE: 72 MMHG

## 2017-01-19 VITALS — DIASTOLIC BLOOD PRESSURE: 62 MMHG | SYSTOLIC BLOOD PRESSURE: 177 MMHG

## 2017-01-19 VITALS — DIASTOLIC BLOOD PRESSURE: 73 MMHG | SYSTOLIC BLOOD PRESSURE: 145 MMHG

## 2017-01-19 VITALS — SYSTOLIC BLOOD PRESSURE: 158 MMHG | DIASTOLIC BLOOD PRESSURE: 76 MMHG

## 2017-01-19 VITALS — SYSTOLIC BLOOD PRESSURE: 15 MMHG | DIASTOLIC BLOOD PRESSURE: 77 MMHG

## 2017-01-19 VITALS — SYSTOLIC BLOOD PRESSURE: 127 MMHG | DIASTOLIC BLOOD PRESSURE: 62 MMHG

## 2017-01-19 VITALS — SYSTOLIC BLOOD PRESSURE: 154 MMHG | DIASTOLIC BLOOD PRESSURE: 68 MMHG

## 2017-01-19 VITALS — SYSTOLIC BLOOD PRESSURE: 151 MMHG | DIASTOLIC BLOOD PRESSURE: 74 MMHG

## 2017-01-19 VITALS — SYSTOLIC BLOOD PRESSURE: 141 MMHG | DIASTOLIC BLOOD PRESSURE: 68 MMHG

## 2017-01-19 VITALS — DIASTOLIC BLOOD PRESSURE: 67 MMHG | SYSTOLIC BLOOD PRESSURE: 145 MMHG

## 2017-01-19 LAB
ALBUMIN SERPL-MCNC: 2.9 G/DL (ref 3.4–5)
ANION GAP SERPL CALC-SCNC: 12 MMOL/L (ref 6–14)
BASOPHILS # BLD AUTO: 0 X10^3/UL (ref 0–0.2)
BASOPHILS NFR BLD: 0 % (ref 0–3)
BUN SERPL-MCNC: 62 MG/DL (ref 7–20)
CALCIUM SERPL-MCNC: 8.6 MG/DL (ref 8.5–10.1)
CHLORIDE SERPL-SCNC: 94 MMOL/L (ref 98–107)
CO2 SERPL-SCNC: 32 MMOL/L (ref 21–32)
CREAT SERPL-MCNC: 9 MG/DL (ref 0.6–1)
EOSINOPHIL NFR BLD: 3 % (ref 0–3)
ERYTHROCYTE [DISTWIDTH] IN BLOOD BY AUTOMATED COUNT: 14.1 % (ref 11.5–14.5)
GFR SERPLBLD BASED ON 1.73 SQ M-ARVRAT: 5.6 ML/MIN
GLUCOSE SERPL-MCNC: 112 MG/DL (ref 70–99)
HCT VFR BLD CALC: 32.3 % (ref 36–47)
HGB BLD-MCNC: 10.5 G/DL (ref 12–15.5)
LYMPHOCYTES # BLD: 1.1 X10^3/UL (ref 1–4.8)
LYMPHOCYTES NFR BLD AUTO: 28 % (ref 24–48)
MCH RBC QN AUTO: 31 PG (ref 25–35)
MCHC RBC AUTO-ENTMCNC: 32 G/DL (ref 31–37)
MCV RBC AUTO: 94 FL (ref 79–100)
MONOCYTES NFR BLD: 19 % (ref 0–9)
NEUTROPHILS NFR BLD AUTO: 50 % (ref 31–73)
PHOSPHATE SERPL-MCNC: 6.9 MG/DL (ref 2.6–4.7)
PLATELET # BLD AUTO: 262 X10^3/UL (ref 140–400)
POTASSIUM SERPL-SCNC: 3.7 MMOL/L (ref 3.5–5.1)
RBC # BLD AUTO: 3.42 X10^6/UL (ref 3.5–5.4)
SODIUM SERPL-SCNC: 138 MMOL/L (ref 136–145)
WBC # BLD AUTO: 4.1 X10^3/UL (ref 4–11)

## 2017-01-19 PROCEDURE — 5A1D00Z: ICD-10-PCS | Performed by: INTERNAL MEDICINE

## 2017-01-19 RX ADMIN — CITALOPRAM HYDROBROMIDE SCH MG: 20 TABLET ORAL at 10:52

## 2017-01-19 RX ADMIN — CALCIUM ACETATE SCH MG: 667 CAPSULE ORAL at 10:53

## 2017-01-19 RX ADMIN — ISOSORBIDE MONONITRATE SCH MG: 60 TABLET, EXTENDED RELEASE ORAL at 10:53

## 2017-01-19 RX ADMIN — AMLODIPINE BESYLATE SCH MG: 10 TABLET ORAL at 10:53

## 2017-01-19 RX ADMIN — PANTOPRAZOLE SODIUM SCH MG: 40 TABLET, DELAYED RELEASE ORAL at 10:52

## 2017-01-19 RX ADMIN — LISINOPRIL SCH MG: 20 TABLET ORAL at 10:53

## 2017-01-19 RX ADMIN — CLOPIDOGREL BISULFATE SCH MG: 75 TABLET ORAL at 09:00

## 2017-01-19 RX ADMIN — ASPIRIN 81 MG CHEWABLE TABLET SCH MG: 81 TABLET CHEWABLE at 10:54

## 2017-01-19 RX ADMIN — GABAPENTIN SCH MG: 100 CAPSULE ORAL at 10:54

## 2017-01-19 RX ADMIN — CLONIDINE HYDROCHLORIDE SCH MG: 0.3 TABLET ORAL at 10:54

## 2017-01-19 NOTE — DISCH
DISCHARGE INSTRUCTIONS


Condition on Discharge


Condition on Discharge:  Stable





Activity After Discharge


Activity Instructions for Disc:  Activity as tolerated





Diet after Discharge


Diet after Discharge:  Cardiac (renal on dialysis )





Contacting the  after DC


Call your doctor for:  Concerns you may have





Follow-Up


Follow up with:  Dr. Boyer in 3-5 days


Follow Up With:  Dialysis as instructed








BREEZY BHAGAT Jan 19, 2017 05:55

## 2017-01-19 NOTE — PDOC
TEJASCIROBREEZY APRN 1/19/17 0554:


IM PROGRESS NOTES-


Subjective


Subjective


dizziness resolved, wants to go home today after dialysis and will obtain 

dialysis at center tomorrow





Objective


Objective


no distress


Vitals





 Vital Signs








  Date Time  Temp Pulse Resp B/P Pulse Ox O2 Delivery O2 Flow Rate FiO2


 


1/19/17 05:00  68 24 127/62 97 Nasal Cannula  


 


1/19/17 04:00 96.2       





 96.2       


 


1/18/17 12:00       2.0 








Input & Output











 Intake and Output 


 


 1/19/17





 07:00


 


Intake Total 980 ml


 


Output Total 170 ml


 


Balance 810 ml


 


 


 


Intake Oral 980 ml


 


Output Urine Total 170 ml


 


# Voids 2











Physical Exam


Physical Exam


General appearance - alert,well appearing, and in no distress


Mental Status - alert, oriented to person, place, and time, affect appropriate 

to mood


Head - normal


Chest - clear to auscultation, no wheezes, rales or rhonchi, symmetric air entry


Heart - S1 and S2 normal


Abdomen - soft, nontender, nondistended, BS+


Neurological - alert and oriented


Musculoskeletal - no muscular tenderness noted


Extremities - no pedal edema


Skin - warm and dry


Labs





Laboratory Tests








Test


  1/18/17


00:57 1/18/17


01:12 1/18/17


01:22 1/18/17


04:10


 


Influenza Type A Antigen


  Negative


(NEGATIVE) 


  


  


 


 


Influenza Type B Antigen


  Negative


(NEGATIVE) 


  


  


 


 


White Blood Count


  


  5.7x10^3/uL


(4.0-11.0) 


  


 


 


Red Blood Count


  


  3.77x10^6/uL


(3.50-5.40) 


  


 


 


Hemoglobin


  


  11.5g/dL


(12.0-15.5) 


  


 


 


Hematocrit


  


  36.0%


(36.0-47.0) 


  


 


 


Mean Corpuscular Volume  95fL ()   


 


Mean Corpuscular Hemoglobin  31pg (25-35)   


 


Mean Corpuscular Hemoglobin


Concent 


  32g/dL (31-37) 


  


  


 


 


Red Cell Distribution Width


  


  14.2%


(11.5-14.5) 


  


 


 


Platelet Count


  


  342x10^3/uL


(140-400) 


  


 


 


Neutrophils (%) (Auto)  76% (31-73)   


 


Lymphocytes (%) (Auto)  16% (24-48)   


 


Monocytes (%) (Auto)  6% (0-9)   


 


Eosinophils (%) (Auto)  1% (0-3)   


 


Basophils (%) (Auto)  1% (0-3)   


 


Neutrophils # (Auto)


  


  4.3x10^3uL


(1.8-7.7) 


  


 


 


Lymphocytes # (Auto)


  


  0.9x10^3/uL


(1.0-4.8) 


  


 


 


Monocytes # (Auto)


  


  0.3x10^3/uL


(0.0-1.1) 


  


 


 


Eosinophils # (Auto)


  


  0.1x10^3/uL


(0.0-0.7) 


  


 


 


Basophils # (Auto)


  


  0.0x10^3/uL


(0.0-0.2) 


  


 


 


Sodium Level


  


  136mmol/L


(136-145) 


  


 


 


Potassium Level


  


  9.2mmol/L


(3.5-5.1) 


  


 


 


Chloride Level


  


  99mmol/L


() 


  


 


 


Carbon Dioxide Level


  


  20mmol/L


(21-32) 


  


 


 


Anion Gap  17 (6-14)   


 


Blood Urea Nitrogen


  


  145mg/dL


(7-20) 


  


 


 


Creatinine


  


  14.4mg/dL


(0.6-1.0) 


  


 


 


Estimated GFR


(Cockcroft-Gault) 


  3.2 


  


  


 


 


BUN/Creatinine Ratio  10 (6-20)   


 


Glucose Level


  


  137mg/dL


(70-99) 


  


 


 


Calcium Level


  


  9.3mg/dL


(8.5-10.1) 


  


 


 


Magnesium Level


  


  2.9mg/dL


(1.8-2.4) 


  


 


 


Total Bilirubin


  


  0.9mg/dL


(0.2-1.0) 


  


 


 


Aspartate Amino Transf


(AST/SGOT) 


  194U/L (15-37) 


  


  


 


 


Alanine Aminotransferase


(ALT/SGPT) 


  139U/L (14-59) 


  


  


 


 


Alkaline Phosphatase  93U/L ()   


 


Creatine Kinase  49U/L ()   


 


Creatine Kinase MB (Mass)


  


  1.2ng/mL


(0.0-3.6) 


  


 


 


Creatine Kinase MB Relative


Index 


  2.4% (0-4) 


  


  


 


 


Troponin I Quantitative


  


  < 0.017ng/mL


(0.000-0.055) 


  


 


 


NT-Pro-B-Type Natriuretic


Peptide 


  57233xk/mL


(0-124) 


  


 


 


Total Protein


  


  8.4g/dL


(6.4-8.2) 


  


 


 


Albumin


  


  3.1g/dL


(3.4-5.0) 


  


 


 


Albumin/Globulin Ratio  0.6 (1.0-1.7)   


 


Bedside Troponin I


  


  


  0.00ng/ml


(<0.08) 


 


 


Nasal Screen MRSA (PCR)


  


  


  


  Negative


(Negative)














Test


  1/18/17


08:03 


  


  


 


 


Sodium Level


  139mmol/L


(136-145) 


  


  


 


 


Potassium Level


  3.0mmol/L


(3.5-5.1) 


  


  


 


 


Chloride Level


  92mmol/L


() 


  


  


 


 


Carbon Dioxide Level


  32mmol/L


(21-32) 


  


  


 


 


Anion Gap 15 (6-14)    


 


Blood Urea Nitrogen 45mg/dL (7-20)    


 


Creatinine


  5.6mg/dL


(0.6-1.0) 


  


  


 


 


Estimated GFR


(Cockcroft-Gault) 9.6 


  


  


  


 


 


Glucose Level


  66mg/dL


(70-99) 


  


  


 


 


Calcium Level


  9.1mg/dL


(8.5-10.1) 


  


  


 








Laboratory Tests








Test


  1/18/17


08:03


 


Sodium Level


  139mmol/L


(136-145)


 


Potassium Level


  3.0mmol/L


(3.5-5.1)


 


Chloride Level


  92mmol/L


()


 


Carbon Dioxide Level


  32mmol/L


(21-32)


 


Anion Gap 15 (6-14) 


 


Blood Urea Nitrogen 45mg/dL (7-20) 


 


Creatinine


  5.6mg/dL


(0.6-1.0)


 


Estimated GFR


(Cockcroft-Gault) 9.6 


 


 


Glucose Level


  66mg/dL


(70-99)


 


Calcium Level


  9.1mg/dL


(8.5-10.1)








Meds





Current Medications


Acetaminophen/ Hydrocodone Bitart (Lortab 5/325) 1 tab PRN Q6HRS  PRN PO PAIN;  

Start 1/18/17 at 08:00


Amlodipine Besylate (Norvasc) 10 mg DAILY PO  Last administered on 1/18/17at 09:

49;  Start 1/18/17 at 09:00


Aspirin (Children'S Aspirin) 81 mg DAILY PO  Last administered on 1/18/17at 09:

51;  Start 1/18/17 at 09:00


Atorvastatin Calcium (Lipitor) 20 mg HS PO  Last administered on 1/18/17at 20:38

;  Start 1/18/17 at 21:00


Calcium Acetate (Phoslo) 667 mg TIDWMEALS PO  Last administered on 1/18/17at 18:

35;  Start 1/18/17 at 08:00


Citalopram Hydrobromide (Celexa) 20 mg BID PO  Last administered on 1/18/17at 20

:38;  Start 1/18/17 at 09:00


Clonidine HCl (Catapres) 0.3 mg TID PO  Last administered on 1/18/17at 20:38;  

Start 1/18/17 at 09:00


Clopidogrel Bisulfate (Plavix) 75 mg DAILY PO  Last administered on 1/18/17at 09

:50;  Start 1/18/17 at 09:00


Folic Acid/ Multivitamins/Vit B12 (Nephro-Svetlana) 1 tab DAILY PO ;  Start 1/18/17 

at 09:00;  Status UNV


Folic Acid/ Multivitamins/Vit B12 1 tab 1 tab DAILY PO  Last administered on 1/ 18/17at 09:50;  Start 1/18/17 at 09:00


Gabapentin (Neurontin) 100 mg BID PO  Last administered on 1/18/17at 20:38;  

Start 1/18/17 at 09:00


Isosorbide Mononitrate (Imdur) 60 mg DAILY PO  Last administered on 1/18/17at 09

:49;  Start 1/18/17 at 09:00


Lisinopril (Prinivil) 20 mg TID PO  Last administered on 1/18/17at 20:38;  

Start 1/18/17 at 09:00


Magnesium Sulfate/ Dextrose (Magnesium Sulfate PREMIX 2GM) 50 ml @ 25 mls/hr 

PRN DAILY  PRN IV for Mag < 1.7 on am labs;  Start 1/18/17 at 09:45


Pantoprazole Sodium (Protonix) 40 mg DAILYAC PO  Last administered on 1/18/17at 

09:50;  Start 1/18/17 at 09:00


Pneumococcal Polyvalent Vaccine (Do NOT chart on this placeholder) 1 each 1X  

ONCE MC ;  Start 1/18/17 at 09:00;  Stop 1/18/17 at 09:01;  Status UNV


Pneumococcal Polyvalent Vaccine (Pneumovax 23) 0.5 ml ONCE ONCE VAX IM  Last 

administered on 1/18/17at 13:01;  Start 1/18/17 at 09:00;  Stop 1/18/17 at 09:01

;  Status DC


Sennosides (Senna) 8.6 mg PRN BID  PRN PO CONSTIPATION;  Start 1/18/17 at 08:00





Assessment


Assessment


FINAL IMPRESSION:


1.  Hyperkalemia, acute.


2.  End-stage renal disease.


3.  Bradycardia with junctional rhythm secondary to hyperkalemia.


4.  Hypertension.


5.  History of congestive heart failure, sees Cardiology at , Dr. Arauz.


6.  Hypertension.


7.  Hyperlipidemia.





PLAN :


hyperkalemia


Admit K 9.2   01/19  3.0





ESRD


in pt hemodialysis 





Bradycardia


resolved with tx hyperkalemia





anemia CD renal stable





For further plan of care, please refer to the orders.


DC initiated





Plan


Plan


For more details regarding further plans, please refer to the orders.





ROSY BANDA MD 1/19/17 1036:


IM PROGRESS NOTES-


Assessment


Assessment


Doing better.see  in 5 days.


The patient was seen and examined by me. Chart reviewed and plan of care 

formulated. Discussed with, reviewed and agree with ARNP's notes, plan of care 

and orders with modifications as necessary.


Discharge Management - 35 minutes.








BREEZY BHAGAT Jan 19, 2017 05:54


ROSY BANDA MD Jan 19, 2017 10:36

## 2017-01-19 NOTE — CONS
DATE OF CONSULTATION:  01/18/2017



PRIMARY PHYSICIAN:  Dr. León.



REASON FOR CONSULTATION:  Critical hyperkalemia.



CONSULTING PHYSICIAN:  Dr. Ortega through the ER.



HISTORY OF PRESENT ILLNESS:  The patient is a 51-year-old, 

female, followed by Dr. Lafleur.  She dialyzes ____ on a Ynojre-Vqclohmbp-Bfppjq

basis.  She was last dialyzed on Friday.  She claims she had some issues with

the staff and she decided not to go on Monday.  She presented yesterday or early

this morning to the ER with generalized weakness, dizziness, lightheadedness,

and was unable to stand up and walk.  She had nausea and vomiting also.  She was

brought to the ER and EKG showed a wide QRS complex per interpretation of the ER

physician.  I-Stat labs as reported to me showed a potassium of little above 8. 

When outpatient labs were available, her potassium was 9.2.  EKG interpretation

per ER note shows heart rate of 45, junctional rhythm, peaked T-waves.  Chest

x-ray also showed pulmonary vascular congestion, small bilateral pleural

effusions, and cardiomegaly.  In this setting, emergent dialysis was arranged

for the patient which has just been completed about an hour or two ago.  The

patient was admitted to the ICU in consultation with Dr. Ortega.  Emergent

temporizing measures were also administered in consultation with Dr. Ortega.



For rest of the details, please see electronic renal consult note.

 



______________________________

CESARIO CHRISTOPHER MD



DR:  PAULINO/[facility]  JOB#:  [id number] / [id number]

DD:  01/18/2017 09:30  DT:  01/19/2017 08:45

## 2017-05-18 ENCOUNTER — HOSPITAL ENCOUNTER (OUTPATIENT)
Dept: HOSPITAL 61 - ER | Age: 52
Setting detail: OBSERVATION
LOS: 5 days | Discharge: HOME | End: 2017-05-23
Attending: INTERNAL MEDICINE | Admitting: INTERNAL MEDICINE
Payer: MEDICARE

## 2017-05-18 VITALS — BODY MASS INDEX: 26.47 KG/M2 | HEIGHT: 61 IN | WEIGHT: 140.19 LBS

## 2017-05-18 VITALS — SYSTOLIC BLOOD PRESSURE: 162 MMHG | DIASTOLIC BLOOD PRESSURE: 79 MMHG

## 2017-05-18 VITALS — SYSTOLIC BLOOD PRESSURE: 158 MMHG | DIASTOLIC BLOOD PRESSURE: 70 MMHG

## 2017-05-18 VITALS — DIASTOLIC BLOOD PRESSURE: 67 MMHG | SYSTOLIC BLOOD PRESSURE: 151 MMHG

## 2017-05-18 VITALS — DIASTOLIC BLOOD PRESSURE: 79 MMHG | SYSTOLIC BLOOD PRESSURE: 172 MMHG

## 2017-05-18 VITALS — SYSTOLIC BLOOD PRESSURE: 163 MMHG | DIASTOLIC BLOOD PRESSURE: 73 MMHG

## 2017-05-18 DIAGNOSIS — I50.33: ICD-10-CM

## 2017-05-18 DIAGNOSIS — R05: ICD-10-CM

## 2017-05-18 DIAGNOSIS — Z99.2: ICD-10-CM

## 2017-05-18 DIAGNOSIS — E78.5: ICD-10-CM

## 2017-05-18 DIAGNOSIS — Z79.899: ICD-10-CM

## 2017-05-18 DIAGNOSIS — K59.00: ICD-10-CM

## 2017-05-18 DIAGNOSIS — R11.2: ICD-10-CM

## 2017-05-18 DIAGNOSIS — R06.02: ICD-10-CM

## 2017-05-18 DIAGNOSIS — N28.9: ICD-10-CM

## 2017-05-18 DIAGNOSIS — I13.2: Primary | ICD-10-CM

## 2017-05-18 DIAGNOSIS — N39.0: ICD-10-CM

## 2017-05-18 DIAGNOSIS — R20.0: ICD-10-CM

## 2017-05-18 DIAGNOSIS — G93.41: ICD-10-CM

## 2017-05-18 DIAGNOSIS — I50.1: ICD-10-CM

## 2017-05-18 DIAGNOSIS — G93.40: ICD-10-CM

## 2017-05-18 DIAGNOSIS — J96.00: ICD-10-CM

## 2017-05-18 DIAGNOSIS — R01.1: ICD-10-CM

## 2017-05-18 DIAGNOSIS — I16.1: ICD-10-CM

## 2017-05-18 DIAGNOSIS — N18.6: ICD-10-CM

## 2017-05-18 DIAGNOSIS — D63.8: ICD-10-CM

## 2017-05-18 LAB
ALBUMIN SERPL-MCNC: 2.9 G/DL (ref 3.4–5)
ALP SERPL-CCNC: 91 U/L (ref 46–116)
ALT SERPL-CCNC: 98 U/L (ref 14–59)
ANION GAP SERPL CALC-SCNC: 10 MMOL/L (ref 6–14)
AST SERPL-CCNC: 99 U/L (ref 15–37)
BACTERIA #/AREA URNS HPF: (no result) /HPF
BARBITURATES UR-MCNC: (no result) UG/ML
BASOPHILS # BLD AUTO: 0 X10^3/UL (ref 0–0.2)
BASOPHILS NFR BLD: 1 % (ref 0–3)
BENZODIAZ UR-MCNC: (no result) UG/L
BILIRUB DIRECT SERPL-MCNC: 0.2 MG/DL (ref 0–0.2)
BILIRUB SERPL-MCNC: 0.6 MG/DL (ref 0.2–1)
BILIRUB UR QL STRIP: NEGATIVE
BUN SERPL-MCNC: 31 MG/DL (ref 7–20)
CALCIUM SERPL-MCNC: 9.4 MG/DL (ref 8.5–10.1)
CANNABINOIDS UR-MCNC: (no result) UG/L
CHLORIDE SERPL-SCNC: 111 MMOL/L (ref 98–107)
CK SERPL-CCNC: 61 U/L (ref 26–192)
CKMB MASS: 1.2 NG/ML (ref 0–3.6)
CO2 SERPL-SCNC: 26 MMOL/L (ref 21–32)
COCAINE UR-MCNC: (no result) NG/ML
CREAT SERPL-MCNC: 5.4 MG/DL (ref 0.6–1)
EOSINOPHIL NFR BLD: 5 % (ref 0–3)
ERYTHROCYTE [DISTWIDTH] IN BLOOD BY AUTOMATED COUNT: 16.9 % (ref 11.5–14.5)
GFR SERPLBLD BASED ON 1.73 SQ M-ARVRAT: 10 ML/MIN
GLUCOSE SERPL-MCNC: 105 MG/DL (ref 70–99)
GLUCOSE UR STRIP-MCNC: NEGATIVE MG/DL
HCT VFR BLD CALC: 26.4 % (ref 36–47)
HGB BLD-MCNC: 8.3 G/DL (ref 12–15.5)
INR PPP: 1.1 (ref 0.8–1.1)
LYMPHOCYTES # BLD: 1 X10^3/UL (ref 1–4.8)
LYMPHOCYTES NFR BLD AUTO: 21 % (ref 24–48)
MAGNESIUM SERPL-MCNC: 2 MG/DL (ref 1.8–2.4)
MCH RBC QN AUTO: 32 PG (ref 25–35)
MCHC RBC AUTO-ENTMCNC: 31 G/DL (ref 31–37)
MCV RBC AUTO: 101 FL (ref 79–100)
METHADONE SERPL-MCNC: (no result) NG/ML
MONOCYTES NFR BLD: 14 % (ref 0–9)
NEUTROPHILS NFR BLD AUTO: 59 % (ref 31–73)
NITRITE UR QL STRIP: NEGATIVE
OPIATES UR-MCNC: (no result) NG/ML
PCP SERPL-MCNC: (no result) MG/DL
PH UR STRIP: 7.5 [PH]
PLATELET # BLD AUTO: 300 X10^3/UL (ref 140–400)
POTASSIUM SERPL-SCNC: 4.3 MMOL/L (ref 3.5–5.1)
PROT SERPL-MCNC: 6.9 G/DL (ref 6.4–8.2)
PROT UR STRIP-MCNC: 100 MG/DL
PROTHROMBIN TIME: 13.7 SEC (ref 11.7–14)
RBC # BLD AUTO: 2.61 X10^6/UL (ref 3.5–5.4)
RBC #/AREA URNS HPF: (no result) /HPF (ref 0–2)
SODIUM SERPL-SCNC: 147 MMOL/L (ref 136–145)
SP GR UR STRIP: 1.01
SQUAMOUS #/AREA URNS LPF: (no result) /LPF
UROBILINOGEN UR-MCNC: 0.2 MG/DL
WBC # BLD AUTO: 4.6 X10^3/UL (ref 4–11)
WBC #/AREA URNS HPF: (no result) /HPF (ref 0–4)

## 2017-05-18 PROCEDURE — 96365 THER/PROPH/DIAG IV INF INIT: CPT

## 2017-05-18 PROCEDURE — 71010: CPT

## 2017-05-18 PROCEDURE — 87086 URINE CULTURE/COLONY COUNT: CPT

## 2017-05-18 PROCEDURE — 84484 ASSAY OF TROPONIN QUANT: CPT

## 2017-05-18 PROCEDURE — 85027 COMPLETE CBC AUTOMATED: CPT

## 2017-05-18 PROCEDURE — 94640 AIRWAY INHALATION TREATMENT: CPT

## 2017-05-18 PROCEDURE — C1771 REP DEV, URINARY, W/SLING: HCPCS

## 2017-05-18 PROCEDURE — 96361 HYDRATE IV INFUSION ADD-ON: CPT

## 2017-05-18 PROCEDURE — 83690 ASSAY OF LIPASE: CPT

## 2017-05-18 PROCEDURE — G0269 OCCLUSIVE DEVICE IN VEIN ART: HCPCS

## 2017-05-18 PROCEDURE — 96375 TX/PRO/DX INJ NEW DRUG ADDON: CPT

## 2017-05-18 PROCEDURE — C1892 INTRO/SHEATH,FIXED,PEEL-AWAY: HCPCS

## 2017-05-18 PROCEDURE — G0379 DIRECT REFER HOSPITAL OBSERV: HCPCS

## 2017-05-18 PROCEDURE — C1769 GUIDE WIRE: HCPCS

## 2017-05-18 PROCEDURE — 81001 URINALYSIS AUTO W/SCOPE: CPT

## 2017-05-18 PROCEDURE — C1773 RET DEV, INSERTABLE: HCPCS

## 2017-05-18 PROCEDURE — 94620: CPT

## 2017-05-18 PROCEDURE — 71275 CT ANGIOGRAPHY CHEST: CPT

## 2017-05-18 PROCEDURE — 84443 ASSAY THYROID STIM HORMONE: CPT

## 2017-05-18 PROCEDURE — 99291 CRITICAL CARE FIRST HOUR: CPT

## 2017-05-18 PROCEDURE — 83735 ASSAY OF MAGNESIUM: CPT

## 2017-05-18 PROCEDURE — 96366 THER/PROPH/DIAG IV INF ADDON: CPT

## 2017-05-18 PROCEDURE — 80061 LIPID PANEL: CPT

## 2017-05-18 PROCEDURE — 36415 COLL VENOUS BLD VENIPUNCTURE: CPT

## 2017-05-18 PROCEDURE — 70450 CT HEAD/BRAIN W/O DYE: CPT

## 2017-05-18 PROCEDURE — 80076 HEPATIC FUNCTION PANEL: CPT

## 2017-05-18 PROCEDURE — 93005 ELECTROCARDIOGRAM TRACING: CPT

## 2017-05-18 PROCEDURE — 93306 TTE W/DOPPLER COMPLETE: CPT

## 2017-05-18 PROCEDURE — 96374 THER/PROPH/DIAG INJ IV PUSH: CPT

## 2017-05-18 PROCEDURE — G0378 HOSPITAL OBSERVATION PER HR: HCPCS

## 2017-05-18 PROCEDURE — 83880 ASSAY OF NATRIURETIC PEPTIDE: CPT

## 2017-05-18 PROCEDURE — 85610 PROTHROMBIN TIME: CPT

## 2017-05-18 PROCEDURE — 82553 CREATINE MB FRACTION: CPT

## 2017-05-18 PROCEDURE — 80048 BASIC METABOLIC PNL TOTAL CA: CPT

## 2017-05-18 PROCEDURE — 96376 TX/PRO/DX INJ SAME DRUG ADON: CPT

## 2017-05-18 PROCEDURE — 93460 R&L HRT ART/VENTRICLE ANGIO: CPT

## 2017-05-18 RX ADMIN — CYCLOBENZAPRINE HYDROCHLORIDE SCH MG: 10 TABLET, FILM COATED ORAL at 21:15

## 2017-05-18 RX ADMIN — LABETALOL HCL SCH MG: 100 TABLET, FILM COATED ORAL at 16:51

## 2017-05-18 RX ADMIN — GABAPENTIN SCH MG: 100 CAPSULE ORAL at 21:15

## 2017-05-18 RX ADMIN — LISINOPRIL SCH MG: 20 TABLET ORAL at 16:50

## 2017-05-18 RX ADMIN — LISINOPRIL SCH MG: 20 TABLET ORAL at 21:15

## 2017-05-18 RX ADMIN — CALCIUM ACETATE SCH MG: 667 CAPSULE ORAL at 16:49

## 2017-05-18 RX ADMIN — LABETALOL HCL SCH MG: 100 TABLET, FILM COATED ORAL at 21:16

## 2017-05-18 RX ADMIN — ATORVASTATIN CALCIUM SCH MG: 20 TABLET, FILM COATED ORAL at 21:16

## 2017-05-18 NOTE — RAD
CT head without contrast    



History: Code stroke, altered mental status, headache.



Comparison: None.







Procedure: Axial images are obtained of the head from the skull base through

the vertex without IV contrast.



Findings:  The ventricles and sulci are normal for the patient's age. No

mass-effect, intracranial mass, midline shift, hemorrhage or obvious acute

infarction is identified.  Basilar cisterns are patent.  Bone windows

demonstrate no significant calvarial abnormality. The visualized paranasal

sinuses appear clear. Mild bilateral periventricular white matter

hypodensities likely chronic small vessel ischemic disease.



Impression: 



    1. No acute intracranial process.  



Patient nurse was informed at time of dictation at 4:15 PM.



PQRS Compliance Statement:



One or more of the following individualized dose reduction techniques were

utilized for this examination:

1. Automated exposure control

2. Adjustment of the mA and/or kV according to patient size

3. Use of iterative reconstruction technique

## 2017-05-18 NOTE — PDOC2
CARDIAC CONSULT


DATE OF CONSULT


Date of Consult


DATE: 5/18/17 


TIME: 15:27





REASON FOR CONSULT


Reason for Consult:


dyspnea, accelerated hypertension





REFERRING PHYSICIAN


Referring Physician:


Dr. Cedillo





SOURCE


Source:  Chart review, Patient





HISTORY OF PRESENT ILLNESS


HISTORY OF PRESENT ILLNESS


This is a 50 yo female admitted for SOA and with multiple complaints.  Reports 

that in the last 2 weeks she has been increasingly getting SOA.  Her  last 

dialysis yesterday indicating that she has her dry weight and no fluids taken 

off yesterday and at the same time she already has high BP with SBP in the 200s 

as she reported and was feeling SOA.  In the last few days she has been having 

increasing DENNY, orthopnea, and has not been able to go to sleep with PND and 

very tired and weak. Reports of nausea and vomiting today and has been having 

tingling and numbness to left arm and left leg.  Positive for throbbing frontal 

HA with tinnitus but denies any circumoral tingling.  No visual disturbances 

but positive for slurred speech as she was told that someone noticed it.  

Denies any energy drinks nor allergy medications use but has been skipping some 

of her BP meds lately because of her symptoms she explained. No CP, no falls or 

any recent injury. Verbalized that she complies with her dialysis schedule.





PAST MEDICAL HISTORY


Cardiovascular:  CAD, CHF, HTN, Hyperlipidemia, Pulmonary hypertension


GI:  GERD


Heme/Onc:  Anemia NOS


Psych:  Anxiety, Depression


Musculoskeletal:   low back pain


Rheumatologic:  Rheumatoid arthritis


ENT:  No pertinent hx


Renal/:  Chronic renal failure (on HD)


Dermatology:  No pertinent hx





PAST SURGICAL HISTORY


Past Surgical History:  Hysterectomy, Other (AV fistula )





FAMILY HISTORY


Family History:  Hypertension





SOCIAL HISTORY


Smoke:  No


ALCOHOL:  none


Drugs:  None


Lives:  with Family





CURRENT MEDICATIONS


CURRENT MEDICATIONS





Current Medications








 Medications


  (Trade)  Dose


 Ordered  Sig/Hugo


 Route


 PRN Reason  Start Time


 Stop Time Status Last Admin


Dose Admin


 


 Hydralazine HCl


  (Apresoline)  10 mg  1X  ONCE


 IVP


   5/18/17 12:15


 5/18/17 12:16 DC 5/18/17 12:16


 


 


 Morphine Sulfate  2 mg  1X  ONCE


 IV


   5/18/17 12:15


 5/18/17 12:16 DC 5/18/17 12:17


 


 


 Albuterol/


 Ipratropium


  (Duoneb)  3 ml  1X  ONCE


 NEB


   5/18/17 13:00


 5/18/17 13:01 DC 5/18/17 13:04


 


 


 Furosemide


  (Lasix)  40 mg  1X  ONCE


 IVP


   5/18/17 13:00


 5/18/17 13:01 DC 5/18/17 13:08


 


 


 Hydralazine HCl


  (Apresoline)  20 mg  1X  ONCE


 IVP


   5/18/17 13:30


 5/18/17 13:31 DC 5/18/17 13:31


 


 


 Ondansetron HCl


  (Zofran)  4 mg  PRN Q8HRS  PRN


 IV


 NAUSEA/VOMITING  5/18/17 13:30


 5/19/17 13:29  5/18/17 14:35


 


 


 Nicardipine HCl


 50 mg/Sodium


 Chloride  270 ml @ 0


 mls/hr  CONT  PRN


 IV


 SEE I/O RECORD  5/18/17 14:15


    5/18/17 14:19


 











ALLERGIES


ALLERGIES:  


Coded Allergies:  


     No Known Drug Allergies (Unverified , 10/20/16)





ROS


Review of System


14 point ROS conducted with pertinent positives noted above in HPI.





PHYSICAL EXAM


General:  Oriented X3, Cooperative, mild distress


Lungs:  Other (basilar crackles)


Heart:  Regular rate, Normal S1, Normal S2, Other (3-4/6 systolic murmur to LLS 

border; S4)


Abdomen:  Soft, No tenderness, Other (truncal obesity)


Extremities:  No cyanosis, Other (LA AV dialysis fistula positive thrill and 

bruit.  Bounding preipheral pulses; 2+ bilateral LE pitting edema)


Skin:  No breakdown, No significant lesion


Neuro:  Normal speech, Sensation intact


Psych/Mental Status:  Other (drowsy)


MUSCULOSKELETAL:  Osteoarthritic changes both hands





VITALS


VITALS





Vital Signs








  Date Time  Temp Pulse Resp B/P (MAP) Pulse Ox O2 Delivery O2 Flow Rate FiO2


 


5/18/17 14:37  88  194/90 (124) 100 Nasal Cannula 2.0 


 


5/18/17 12:47   24     


 


5/18/17 10:21 97.5       





 97.5       











LABS


Lab:





Laboratory Tests








Test


  5/18/17


10:43 5/18/17


11:25 5/18/17


13:55


 


White Blood Count


  4.6 x10^3/uL


(4.0-11.0) 


  


 


 


Red Blood Count


  2.61 x10^6/uL


(3.50-5.40) 


  


 


 


Hemoglobin


  8.3 g/dL


(12.0-15.5) 


  


 


 


Hematocrit


  26.4 %


(36.0-47.0) 


  


 


 


Mean Corpuscular Volume


  101 fL


() 


  


 


 


Mean Corpuscular Hemoglobin 32 pg (25-35)   


 


Mean Corpuscular Hemoglobin


Concent 31 g/dL


(31-37) 


  


 


 


Red Cell Distribution Width


  16.9 %


(11.5-14.5) 


  


 


 


Platelet Count


  300 x10^3/uL


(140-400) 


  


 


 


Neutrophils (%) (Auto) 59 % (31-73)   


 


Lymphocytes (%) (Auto) 21 % (24-48)   


 


Monocytes (%) (Auto) 14 % (0-9)   


 


Eosinophils (%) (Auto) 5 % (0-3)   


 


Basophils (%) (Auto) 1 % (0-3)   


 


Neutrophils # (Auto)


  2.7 x10^3uL


(1.8-7.7) 


  


 


 


Lymphocytes # (Auto)


  1.0 x10^3/uL


(1.0-4.8) 


  


 


 


Monocytes # (Auto)


  0.6 x10^3/uL


(0.0-1.1) 


  


 


 


Eosinophils # (Auto)


  0.2 x10^3/uL


(0.0-0.7) 


  


 


 


Basophils # (Auto)


  0.0 x10^3/uL


(0.0-0.2) 


  


 


 


Prothrombin Time


  13.7 SEC


(11.7-14.0) 


  


 


 


Prothromb Time International


Ratio 1.1 (0.8-1.1) 


  


  


 


 


Sodium Level


  


  147 mmol/L


(136-145) 


 


 


Potassium Level


  


  4.3 mmol/L


(3.5-5.1) 


 


 


Chloride Level


  


  111 mmol/L


() 


 


 


Carbon Dioxide Level


  


  26 mmol/L


(21-32) 


 


 


Anion Gap  10 (6-14)  


 


Blood Urea Nitrogen


  


  31 mg/dL


(7-20) 


 


 


Creatinine


  


  5.4 mg/dL


(0.6-1.0) 


 


 


Estimated GFR


(Cockcroft-Gault) 


  10.0 


  


 


 


Glucose Level


  


  105 mg/dL


(70-99) 


 


 


Calcium Level


  


  9.4 mg/dL


(8.5-10.1) 


 


 


Magnesium Level


  


  2.0 mg/dL


(1.8-2.4) 


 


 


Total Bilirubin


  


  0.6 mg/dL


(0.2-1.0) 


 


 


Direct Bilirubin


  


  0.2 mg/dL


(0.0-0.2) 


 


 


Aspartate Amino Transf


(AST/SGOT) 


  99 U/L (15-37) 


  


 


 


Alanine Aminotransferase


(ALT/SGPT) 


  98 U/L (14-59) 


  


 


 


Alkaline Phosphatase


  


  91 U/L


() 


 


 


Creatine Kinase


  


  61 U/L


() 


 


 


Creatine Kinase MB (Mass)


  


  1.2 ng/mL


(0.0-3.6) 


 


 


Creatine Kinase MB Relative


Index 


   % (0-4) 


  


 


 


Troponin I Quantitative


  


  0.034 ng/mL


(0.000-0.055) 


 


 


NT-Pro-B-Type Natriuretic


Peptide 


  > 63374 pg/mL


(0-124) 


 


 


Total Protein


  


  6.9 g/dL


(6.4-8.2) 


 


 


Albumin


  


  2.9 g/dL


(3.4-5.0) 


 


 


Lipase


  


  121 U/L


() 


 


 


Thyroid Stimulating Hormone


(TSH) 


  1.937 uIU/mL


(0.358-3.74) 


 


 


Urine Collection Type   Unknown 


 


Urine Color   Yellow 


 


Urine Clarity   Clear 


 


Urine pH   7.5 


 


Urine Specific Gravity   1.010 


 


Urine Protein


  


  


  100 mg/dL


(NEG-TRACE)


 


Urine Glucose (UA)


  


  


  Negative mg/dL


(NEG)


 


Urine Ketones (Stick)


  


  


  Negative mg/dL


(NEG)


 


Urine Blood   Small (NEG) 


 


Urine Nitrite   Negative (NEG) 


 


Urine Bilirubin   Negative (NEG) 


 


Urine Urobilinogen Dipstick


  


  


  0.2 mg/dL (0.2


mg/dL)


 


Urine Leukocyte Esterase   Moderate (NEG) 


 


Urine RBC   1-2 /HPF (0-2) 


 


Urine WBC


  


  


  20-40 /HPF


(0-4)


 


Urine Squamous Epithelial


Cells 


  


  Many /LPF 


 


 


Urine Bacteria


  


  


  Moderate /HPF


(0-FEW)


 


Urine Opiates Screen   Neg (NEG) 


 


Urine Methadone Screen   Neg (NEG) 


 


Urine Barbiturates   Neg (NEG) 


 


Urine Phencyclidine Screen   Neg (NEG) 


 


Urine


Amphetamine/Methamphetamine 


  


  Neg (NEG) 


 


 


Urine Benzodiazepines Screen   Neg (NEG) 


 


Urine Cocaine Screen   Neg (NEG) 


 


Urine Cannabinoids Screen   Neg (NEG) 


 


Urine Ethyl Alcohol   Neg (NEG) 











ECHOCARDIOGRAM


ECHOCARDIOGRAM


<Conclusion>


The left ventricle is normal size.


There is borderline concentric left ventricular hypertrophy.


The left ventricular systolic function is normal and the ejection fraction is 

within normal range.


The Ejection Fraction is 55-60%.


Transmitral Doppler flow pattern is Grade II-pseudonormal filling dynamics.


There is moderate pericardial effusion.


There is no aortic stenosis or regurgitation.


the lsft atrium is of a normal sizw.


There is no mitral stenosis a d a mild mitral regurgitation


The right ventricle and right atrium are of a normal size


Doppler and Color Flow revealed mild to moderate tricuspid regurgitation.


The PA pressure was estimated at 39 mmHg.


There ismild  pulmonary hypertension.


The pulmonary valve is normal in structure and function.





DATE:     02/08/16 1923





ASSESSMENT/PLAN


ASSESSMENT/PLAN


1.  Hypertensive encephalopathy


2.  Acute on chronic diastolic CHF


3.  Malignant HTN; on Cardene


4.  ESRD; dry wt 137 on 1/17?, now 144 


5.  HA, possible stroke symptoms 


6.  Possible non-compliance; verbalized skipping meds. 


7.  Anemia of chronic disease











Recommendations





1.  Check echo to assess LV function


2.  CT head. Consult neuro 


3.  Resume home antiHTN therapy. Titrate Cardene gtt off as able.


4.  Received Lasix 40mg IV in ED. Further fluid offloading in HD per nephrology


5.  Reinforced medication compliance 


6.  Further recommendations pending diagnostics


Problems:  











MERLENE RADER May 18, 2017 15:55

## 2017-05-18 NOTE — RAD
Examination: Single frontal view chest



History: History of shortness of breath



Comparison: 1/18/2017



Findings:



Moderate cardiomegaly unchanged. Moderate prominent bilateral interstitial

lung markings likely congestive changes.



Impression:



Moderate congestive changes.

## 2017-05-18 NOTE — PHYS DOC
Past Medical History


Past Medical History:  Anxiety, CAD, CHF, Depression, GERD, High Cholesterol, 

Hypertension


Additional Past Medical Histor:  HD, constipation


Past Surgical History:  Other


Additional Past Surgical Histo:  fistula to right arm


Alcohol Use:  None


Drug Use:  None





Adult General


Chief Complaint


Chief Complaint:  SHORTNESS OF BREATH





HPI


HPI





Patient is a 51  year old -American female who presents with worsening 

shortness of breath and chest discomfort. She states the chest pain is there 

when she is trying to walk long distances she has to stop because she also 

short of breath with it. She denies any nausea vomiting. She states his been 

going on for 2 weeks is getting worse. She can't lay flat now has to sleep and 

with a pillow or sitting up in chair. She states that her face is swollen and 

her hands. She states that she does dialysis  and she is 

coming in under her dry weight is 67 kg and she states that the chart takeoff 

more than that then she started getting cramps. She denies any fevers chills. 

She states Dr. Gunter is her dialysis doctor and Dr. Ruggiero is her primary 

care doctor.





Review of Systems


Review of Systems





Constitutional: Denies fever or chills []


Eyes: Denies change in visual acuity, redness, or eye pain []


HENT: Denies nasal congestion or sore throat []


Respiratory: Denies cough, positive for shortness of breath []


Cardiovascular: No additional information not addressed in HPI []


GI: Denies abdominal pain, nausea, vomiting, bloody stools or diarrhea []


: Denies dysuria or hematuria []


Musculoskeletal: Denies back pain or joint pain []


Integument: Denies rash or skin lesions []


Neurologic: Denies headache, focal weakness or sensory changes []


Endocrine: Denies polyuria or polydipsia []





Current Medications


Current Medications





Current Medications








 Medications


  (Trade)  Dose


 Ordered  Sig/Hugo  Start Time


 Stop Time Status Last Admin


Dose Admin


 


 Albuterol/


 Ipratropium


  (Duoneb)  3 ml  1X  ONCE  17 13:00


 17 13:01 DC 17 13:04


3 ML


 


 Furosemide


  (Lasix)  40 mg  1X  ONCE  17 13:00


 17 13:01 DC 17 13:08


40 MG


 


 Hydralazine HCl


  (Apresoline)  10 mg  1X  ONCE  17 12:15


 17 12:16 DC 17 12:16


10 MG


 


 Morphine Sulfate  2 mg  1X  ONCE  17 12:15


 17 12:16 DC 17 12:17


2 MG











Allergies


Allergies





Allergies








Coded Allergies Type Severity Reaction Last Updated Verified


 


  No Known Drug Allergies    10/20/16 No











Physical Exam


Physical Exam





Constitutional: Well developed, well nourished, no acute distress, non-toxic 

appearance. []


HENT: Normocephalic, atraumatic, bilateral external ears normal, oropharynx 

moist, no oral exudates, nose normal. []


Eyes: PERRLA, EOMI, conjunctiva normal, no discharge. [] 


Neck: Normal range of motion, no tenderness, supple, no stridor. [] 


Cardiovascular:Heart rate regular rhythm, no murmur []


Lungs & Thorax:  Bilateral breath sounds clear to auscultation []


Abdomen: Bowel sounds normal, soft, no tenderness, no masses, no pulsatile 

masses. [] 


Skin: Warm, dry, no erythema, no rash. [] 


Back: No tenderness, no CVA tenderness. [] 


Extremities: No tenderness, no cyanosis, no clubbing, ROM intact, trace 

bilateral lower extremity edema. [] 


Neurologic: Alert and oriented X 3, normal motor function, normal sensory 

function, no focal deficits noted. []


Psychologic: Affect normal, judgement normal, mood normal. []





Current Patient Data


Vital Signs





 Vital Signs








  Date Time  Temp Pulse Resp B/P (MAP) Pulse Ox O2 Delivery O2 Flow Rate FiO2


 


17 13:09     93 Room Air  


 


17 13:07  90  224/98 (140)   2.0 


 


17 12:47   24     


 


17 10:21 97.5       





 97.5       








Lab Values





 Laboratory Tests








Test


  17


10:43 17


11:25


 


White Blood Count


  4.6 x10^3/uL


(4.0-11.0) 


 


 


Red Blood Count


  2.61 x10^6/uL


(3.50-5.40)  L 


 


 


Hemoglobin


  8.3 g/dL


(12.0-15.5)  L 


 


 


Hematocrit


  26.4 %


(36.0-47.0)  L 


 


 


Mean Corpuscular Volume


  101 fL


()  H 


 


 


Mean Corpuscular Hemoglobin 32 pg (25-35)   


 


Mean Corpuscular Hemoglobin


Concent 31 g/dL


(31-37) 


 


 


Red Cell Distribution Width


  16.9 %


(11.5-14.5)  H 


 


 


Platelet Count


  300 x10^3/uL


(140-400) 


 


 


Neutrophils (%) (Auto) 59 % (31-73)   


 


Lymphocytes (%) (Auto) 21 % (24-48)  L 


 


Monocytes (%) (Auto) 14 % (0-9)  H 


 


Eosinophils (%) (Auto) 5 % (0-3)  H 


 


Basophils (%) (Auto) 1 % (0-3)   


 


Neutrophils # (Auto)


  2.7 x10^3uL


(1.8-7.7) 


 


 


Lymphocytes # (Auto)


  1.0 x10^3/uL


(1.0-4.8) 


 


 


Monocytes # (Auto)


  0.6 x10^3/uL


(0.0-1.1) 


 


 


Eosinophils # (Auto)


  0.2 x10^3/uL


(0.0-0.7) 


 


 


Basophils # (Auto)


  0.0 x10^3/uL


(0.0-0.2) 


 


 


Prothrombin Time


  13.7 SEC


(11.7-14.0) 


 


 


Prothrombin Time INR 1.1 (0.8-1.1)   


 


Sodium Level


  


  147 mmol/L


(136-145)  H


 


Potassium Level


  


  4.3 mmol/L


(3.5-5.1)


 


Chloride Level


  


  111 mmol/L


()  H


 


Carbon Dioxide Level


  


  26 mmol/L


(21-32)


 


Anion Gap  10 (6-14)  


 


Blood Urea Nitrogen


  


  31 mg/dL


(7-20)  H


 


Creatinine


  


  5.4 mg/dL


(0.6-1.0)  H


 


Estimated GFR


(Cockcroft-Gault) 


  10.0  


 


 


Glucose Level


  


  105 mg/dL


(70-99)  H


 


Calcium Level


  


  9.4 mg/dL


(8.5-10.1)


 


Magnesium Level


  


  2.0 mg/dL


(1.8-2.4)


 


Total Bilirubin


  


  0.6 mg/dL


(0.2-1.0)


 


Direct Bilirubin


  


  0.2 mg/dL


(0.0-0.2)


 


Aspartate Amino Transferase


(AST) 


  99 U/L (15-37)


H


 


Alanine Aminotransferase (ALT)


  


  98 U/L (14-59)


H


 


Alkaline Phosphatase


  


  91 U/L


()


 


Creatine Kinase


  


  61 U/L


()


 


Creatine Kinase MB (Mass)


  


  1.2 ng/mL


(0.0-3.6)


 


Creatine Kinase MB Relative


Index 


   % (0-4)  


 


 


Troponin I Quantitative


  


  0.034 ng/mL


(0.000-0.055)


 


NT-Pro-B-Type Natriuretic


Peptide 


  > 48019 pg/mL


(0-124)  H


 


Total Protein


  


  6.9 g/dL


(6.4-8.2)


 


Albumin


  


  2.9 g/dL


(3.4-5.0)  L


 


Lipase


  


  121 U/L


()


 


Thyroid Stimulating Hormone


(TSH) 


  1.937 uIU/mL


(0.358-3.74)





 Laboratory Tests


17 10:43








 Laboratory Tests


17 11:25











EKG


EKG


EKG shows sinus rhythm with a rate of 82 bpm without any ST elevations, T-wave 

inversions noted in lead 3, normal axis,  ms, as interpreted by me.





Radiology/Procedures


Radiology/Procedures


 [facility]


 [address] [place] [address]


 [phone]


 


 IMAGING REPORT





 Signed





PATIENT: ISATU WHEELER ACCOUNT: [id number] MRN#: [id number]


: 1965 LOCATION: ER AGE: 51


SEX: F EXAM DT: 17 ACCESSION#: [id number]


STATUS: PRE ER ORD. PHYSICIAN: SAUNDRA PARR MD 


REASON: soa


PROCEDURE: PORTABLE CHEST 1V





Examination: Single frontal view chest





History: History of shortness of breath





Comparison: 2017





Findings:





Moderate cardiomegaly unchanged. Moderate prominent bilateral interstitial


lung markings likely congestive changes.





Impression:





Moderate congestive changes.




















DICTATED and SIGNED BY:     AJ SCHROEDER MD


DATE:     17 1044





CC: ANEESH BRISCOE; SAUNDRA PARR MD ~


Impressions:


Accelerated Hypertension





Dyspnea





Ends Stage renal disease





Course & Med Decision Making


Course & Med Decision Making


Pertinent Labs and Imaging studies reviewed. (See chart for details)





Patient presented with blood pressures in the 220s, she received 2 doses of 

hydralazine without any improvement. She likely has some pulmonary hypertension 

which is causing her shortness of breath. She received 40 mg IV Lasix and 2 L 

of oxygen for her symptoms, the patient is being admitted to Dr. León for 

admission. I've placed interim orders and placed a consultation to nephrology. 

I have not spoke with the nephrologist as they have not returned her page as of 

yet. Her blood pressure still in the 220s therefore have started a Cardene 

drip. The patient is being admitted to the [facility]. I have placed a consult with 

cardiology regarding the patient and spoke with Dr. Cronin with nephrology. I 

have written orders for Cardene to be titrated to keep his systolics around 180 

mmHg until otherwise directed by cardiology or nephrology or primary care.





 My Critical Care:


Critical Care 


   Time:             55 minutes


   Treatments/Evaluations:   Close monitoring and treatment of unstable vital 

signs, cardiorespiratory, and neurologic status, while maintaining tight 

balance of fluid, respiratory, and cardiac interventions.





Dragon Disclaimer


Dragon Disclaimer


This electronic medical record was generated, in whole or in part, using a 

voice recognition dictation system.





Departure


Departure


Impression:  


 Primary Impression:  


 Malignant essential hypertension with CHF with renal disease


Disposition:   ADMITTED AS INPATIENT


Admitting Physician:  Nara León


Condition:  STABLE


Referrals:  


ANEESH BRISCOE (PCP)











SAUNDRA PARR MD May 18, 2017 10:18

## 2017-05-18 NOTE — ACF
Admit Criteria Forms


                                                   HYPERTENSION





Clinical Indications for Admission to Inpatient Care


                                                    


                                                                              (

Place "X" for any and all applicable criteria):                    


                                                                       


Admission is indicated for ANY ONE of the following(1)(2)(3)(4):


[ ]I.   Hypertensive emergency, with evidence of acute and progressing target 

organ disease as indicated 


        by ANY ONE of the following:


        [ ]a)          Hypertensive encephalopathy (eg, confusion, altered 

mental status)


        [ ]b)          Cerebral infarction


        [ ]c)   Intracranial hemorrhage


        [ ]d)   Myocardial ischemia or infarction


        [ ]e)   Pulmonary edema


        [ ]f)   Aortic dissection


        [ ]g)   Seizure


        [ ]h)   Acute renal insufficiency


        [ ]i)   Papilledema


        [ ]j)   Microangiopathic hemolytic anemia


[ ]II.  Adrenergic crisis (eg, severe hypertension due to pheochromocytoma 

crisis, cocaine or amphetamine


        intoxication, or clonidine withdrawal)


[ x]III.  Severe hypertension (SBP greater than 180 mmHg or DBP greater than 

110 mmHg or greater than


        the 95th percentile for age, gender, and height in pediatric patients)  

that cannot be controlled


        (eg, to SBP less than 160 mmHg and DBP less than 100 mmHg in adults) by 

treatment with oral 


        medication in emergency department or  observation care











Extended stay beyond goal length of stay may be needed for(11)(12)(13):


[ ]a)    Persistent hypertensive encephalopathy


[ ]b)    Continuation of pulmonary edema


[ ]c)    Recurring or persistent severe hypertension


[ ]d)    Target organ damage (eg, angina, stroke, aortic dissection)


[ ]e)    Associated renal insufficiency 








The original [employer] content created by [employer] has been revised. 


The portions of the content which have been revised are identified through the 

use of italic text or in bold, and [facility] 


has neither reviewed nor approved the modified material. All other unmodified 

content is copyright  [employer].





Please see references footnoted in the original [employer] edition 

2016











ISREAL PENNINGTON May 18, 2017 12:44

## 2017-05-18 NOTE — EKG
[facility]

              [address] [place] [address]

Test Date:    2017               Test Time:    10:33:30

Pat Name:     ISATU WHEELER           Department:   

Patient ID:   [id number]           Room:          

Gender:       F                        Technician:   

:          1965               Requested By: SAUNDRA PARR

Order Number: [id number]            Reading MD:   Phuc Keita

                                 Measurements

Intervals                              Axis          

Rate:         82                       P:            54

FL:           130                      QRS:          65

QRSD:         98                       T:            24

QT:           382                                    

QTc:          449                                    

                           Interpretive Statements

SINUS RHYTHM

RBBB

Electronically Signed On 2017 14:11:43 CDT by Phuc Keita

## 2017-05-19 VITALS — SYSTOLIC BLOOD PRESSURE: 122 MMHG | DIASTOLIC BLOOD PRESSURE: 62 MMHG

## 2017-05-19 VITALS — SYSTOLIC BLOOD PRESSURE: 145 MMHG | DIASTOLIC BLOOD PRESSURE: 68 MMHG

## 2017-05-19 VITALS — SYSTOLIC BLOOD PRESSURE: 141 MMHG | DIASTOLIC BLOOD PRESSURE: 68 MMHG

## 2017-05-19 VITALS — SYSTOLIC BLOOD PRESSURE: 175 MMHG | DIASTOLIC BLOOD PRESSURE: 82 MMHG

## 2017-05-19 VITALS — SYSTOLIC BLOOD PRESSURE: 128 MMHG | DIASTOLIC BLOOD PRESSURE: 64 MMHG

## 2017-05-19 VITALS — DIASTOLIC BLOOD PRESSURE: 72 MMHG | SYSTOLIC BLOOD PRESSURE: 132 MMHG

## 2017-05-19 VITALS — DIASTOLIC BLOOD PRESSURE: 70 MMHG | SYSTOLIC BLOOD PRESSURE: 149 MMHG

## 2017-05-19 VITALS — SYSTOLIC BLOOD PRESSURE: 143 MMHG | DIASTOLIC BLOOD PRESSURE: 71 MMHG

## 2017-05-19 VITALS — SYSTOLIC BLOOD PRESSURE: 133 MMHG | DIASTOLIC BLOOD PRESSURE: 65 MMHG

## 2017-05-19 LAB
ANION GAP SERPL CALC-SCNC: 11 MMOL/L (ref 6–14)
BASOPHILS # BLD AUTO: 0 X10^3/UL (ref 0–0.2)
BASOPHILS NFR BLD: 0 % (ref 0–3)
BUN SERPL-MCNC: 40 MG/DL (ref 7–20)
CALCIUM SERPL-MCNC: 9.1 MG/DL (ref 8.5–10.1)
CHLORIDE SERPL-SCNC: 110 MMOL/L (ref 98–107)
CHOLEST SERPL-MCNC: 114 MG/DL (ref 0–200)
CHOLEST/HDLC SERPL: 2.9 {RATIO}
CO2 SERPL-SCNC: 24 MMOL/L (ref 21–32)
CREAT SERPL-MCNC: 6.2 MG/DL (ref 0.6–1)
EOSINOPHIL NFR BLD: 1 % (ref 0–3)
ERYTHROCYTE [DISTWIDTH] IN BLOOD BY AUTOMATED COUNT: 17.1 % (ref 11.5–14.5)
GFR SERPLBLD BASED ON 1.73 SQ M-ARVRAT: 8.5 ML/MIN
GLUCOSE SERPL-MCNC: 109 MG/DL (ref 70–99)
HCT VFR BLD CALC: 25.9 % (ref 36–47)
HDLC SERPL-MCNC: 39 MG/DL (ref 40–60)
HGB BLD-MCNC: 8.1 G/DL (ref 12–15.5)
LYMPHOCYTES # BLD: 0.7 X10^3/UL (ref 1–4.8)
LYMPHOCYTES NFR BLD AUTO: 15 % (ref 24–48)
MCH RBC QN AUTO: 32 PG (ref 25–35)
MCHC RBC AUTO-ENTMCNC: 31 G/DL (ref 31–37)
MCV RBC AUTO: 101 FL (ref 79–100)
MONOCYTES NFR BLD: 13 % (ref 0–9)
NEUTROPHILS NFR BLD AUTO: 70 % (ref 31–73)
NONHDLC SERPL-MCNC: 75 MG/DL (ref 0–129)
PLATELET # BLD AUTO: 280 X10^3/UL (ref 140–400)
POTASSIUM SERPL-SCNC: 4.8 MMOL/L (ref 3.5–5.1)
RBC # BLD AUTO: 2.57 X10^6/UL (ref 3.5–5.4)
SODIUM SERPL-SCNC: 145 MMOL/L (ref 136–145)
TRIGL SERPL-MCNC: 83 MG/DL (ref 0–150)
WBC # BLD AUTO: 5.1 X10^3/UL (ref 4–11)

## 2017-05-19 RX ADMIN — LABETALOL HCL SCH MG: 100 TABLET, FILM COATED ORAL at 20:57

## 2017-05-19 RX ADMIN — LISINOPRIL SCH MG: 20 TABLET ORAL at 09:24

## 2017-05-19 RX ADMIN — CYCLOBENZAPRINE HYDROCHLORIDE SCH MG: 10 TABLET, FILM COATED ORAL at 09:22

## 2017-05-19 RX ADMIN — CALCIUM ACETATE SCH MG: 667 CAPSULE ORAL at 12:45

## 2017-05-19 RX ADMIN — CITALOPRAM HYDROBROMIDE SCH MG: 20 TABLET ORAL at 09:24

## 2017-05-19 RX ADMIN — CALCIUM ACETATE SCH MG: 667 CAPSULE ORAL at 18:01

## 2017-05-19 RX ADMIN — CYCLOBENZAPRINE HYDROCHLORIDE SCH MG: 10 TABLET, FILM COATED ORAL at 20:56

## 2017-05-19 RX ADMIN — ASPIRIN SCH MG: 81 TABLET, COATED ORAL at 09:24

## 2017-05-19 RX ADMIN — ISOSORBIDE MONONITRATE SCH MG: 30 TABLET, EXTENDED RELEASE ORAL at 09:23

## 2017-05-19 RX ADMIN — CALCIUM ACETATE SCH MG: 667 CAPSULE ORAL at 09:24

## 2017-05-19 RX ADMIN — Medication SCH TAB: at 09:22

## 2017-05-19 RX ADMIN — GABAPENTIN SCH MG: 100 CAPSULE ORAL at 20:56

## 2017-05-19 RX ADMIN — GABAPENTIN SCH MG: 100 CAPSULE ORAL at 09:23

## 2017-05-19 RX ADMIN — PANTOPRAZOLE SODIUM SCH MG: 40 TABLET, DELAYED RELEASE ORAL at 09:24

## 2017-05-19 RX ADMIN — LABETALOL HCL SCH MG: 100 TABLET, FILM COATED ORAL at 09:22

## 2017-05-19 RX ADMIN — LISINOPRIL SCH MG: 20 TABLET ORAL at 20:57

## 2017-05-19 RX ADMIN — ATORVASTATIN CALCIUM SCH MG: 20 TABLET, FILM COATED ORAL at 20:56

## 2017-05-19 NOTE — PDOC
Provider Note


Provider Note


Pt seen ,H&P dictated.











KAROLINA ENRIQUE MD May 19, 2017 10:16

## 2017-05-19 NOTE — PDOC
PROGRESS NOTES


Assessment


Problems


Medical Problems:


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


Status: Acute  





Hypertensive encephalopathy, better


No sign of stroke





Plan


Continue observation


No other neurological testing required as she is better


Subjective


She feels better


Objective





Vital Signs








  Date Time  Temp Pulse Resp B/P (MAP) Pulse Ox O2 Delivery O2 Flow Rate FiO2


 


5/19/17 09:24    162/76    


 


5/19/17 09:23  74      


 


5/19/17 07:00 98.1  14  95 Nasal Cannula 2.0 





 98.1       














Intake and Output 


 


 5/19/17





 07:00


 


Intake Total 640 ml


 


Output Total 370 ml


 


Balance 270 ml


 


 


 


Intake Oral 400 ml


 


IV Total 240 ml


 


Output Urine Total 370 ml








PHYSICAL EXAM


Alert. Oriented to time, place and person.


PERRL.


EOMI.


CN: no focal findings.


Benign fundoscopy


Muscle tone: normal.


Muscle strength: 5/5


DTR: 2+


Plantar reflex: flexor


Gait: not examined in bed.


Sensory exam: no abnormal findings.


No cerebellar signs elicited.


Review of Relevant


I have reviewed the following items ant (where applicable) has been applied.


Labs





Laboratory Tests








Test


  5/18/17


10:43 5/18/17


11:25 5/18/17


13:55 5/18/17


19:05


 


White Blood Count


  4.6 x10^3/uL


(4.0-11.0) 


  


  


 


 


Red Blood Count


  2.61 x10^6/uL


(3.50-5.40) 


  


  


 


 


Hemoglobin


  8.3 g/dL


(12.0-15.5) 


  


  


 


 


Hematocrit


  26.4 %


(36.0-47.0) 


  


  


 


 


Mean Corpuscular Volume


  101 fL


() 


  


  


 


 


Mean Corpuscular Hemoglobin 32 pg (25-35)    


 


Mean Corpuscular Hemoglobin


Concent 31 g/dL


(31-37) 


  


  


 


 


Red Cell Distribution Width


  16.9 %


(11.5-14.5) 


  


  


 


 


Platelet Count


  300 x10^3/uL


(140-400) 


  


  


 


 


Neutrophils (%) (Auto) 59 % (31-73)    


 


Lymphocytes (%) (Auto) 21 % (24-48)    


 


Monocytes (%) (Auto) 14 % (0-9)    


 


Eosinophils (%) (Auto) 5 % (0-3)    


 


Basophils (%) (Auto) 1 % (0-3)    


 


Neutrophils # (Auto)


  2.7 x10^3uL


(1.8-7.7) 


  


  


 


 


Lymphocytes # (Auto)


  1.0 x10^3/uL


(1.0-4.8) 


  


  


 


 


Monocytes # (Auto)


  0.6 x10^3/uL


(0.0-1.1) 


  


  


 


 


Eosinophils # (Auto)


  0.2 x10^3/uL


(0.0-0.7) 


  


  


 


 


Basophils # (Auto)


  0.0 x10^3/uL


(0.0-0.2) 


  


  


 


 


Prothrombin Time


  13.7 SEC


(11.7-14.0) 


  


  


 


 


Prothromb Time International


Ratio 1.1 (0.8-1.1) 


  


  


  


 


 


Sodium Level


  


  147 mmol/L


(136-145) 


  


 


 


Potassium Level


  


  4.3 mmol/L


(3.5-5.1) 


  


 


 


Chloride Level


  


  111 mmol/L


() 


  


 


 


Carbon Dioxide Level


  


  26 mmol/L


(21-32) 


  


 


 


Anion Gap  10 (6-14)   


 


Blood Urea Nitrogen


  


  31 mg/dL


(7-20) 


  


 


 


Creatinine


  


  5.4 mg/dL


(0.6-1.0) 


  


 


 


Estimated GFR


(Cockcroft-Gault) 


  10.0 


  


  


 


 


Glucose Level


  


  105 mg/dL


(70-99) 


  


 


 


Calcium Level


  


  9.4 mg/dL


(8.5-10.1) 


  


 


 


Magnesium Level


  


  2.0 mg/dL


(1.8-2.4) 


  


 


 


Total Bilirubin


  


  0.6 mg/dL


(0.2-1.0) 


  


 


 


Direct Bilirubin


  


  0.2 mg/dL


(0.0-0.2) 


  


 


 


Aspartate Amino Transf


(AST/SGOT) 


  99 U/L (15-37) 


  


  


 


 


Alanine Aminotransferase


(ALT/SGPT) 


  98 U/L (14-59) 


  


  


 


 


Alkaline Phosphatase


  


  91 U/L


() 


  


 


 


Creatine Kinase


  


  61 U/L


() 


  


 


 


Creatine Kinase MB (Mass)


  


  1.2 ng/mL


(0.0-3.6) 


  


 


 


Creatine Kinase MB Relative


Index 


   % (0-4) 


  


  


 


 


Troponin I Quantitative


  


  0.034 ng/mL


(0.000-0.055) 


  0.063 ng/mL


(0.000-0.055)


 


NT-Pro-B-Type Natriuretic


Peptide 


  > 48506 pg/mL


(0-124) 


  


 


 


Total Protein


  


  6.9 g/dL


(6.4-8.2) 


  


 


 


Albumin


  


  2.9 g/dL


(3.4-5.0) 


  


 


 


Lipase


  


  121 U/L


() 


  


 


 


Thyroid Stimulating Hormone


(TSH) 


  1.937 uIU/mL


(0.358-3.74) 


  


 


 


Urine Collection Type   Unknown  


 


Urine Color   Yellow  


 


Urine Clarity   Clear  


 


Urine pH   7.5  


 


Urine Specific Gravity   1.010  


 


Urine Protein


  


  


  100 mg/dL


(NEG-TRACE) 


 


 


Urine Glucose (UA)


  


  


  Negative mg/dL


(NEG) 


 


 


Urine Ketones (Stick)


  


  


  Negative mg/dL


(NEG) 


 


 


Urine Blood   Small (NEG)  


 


Urine Nitrite   Negative (NEG)  


 


Urine Bilirubin   Negative (NEG)  


 


Urine Urobilinogen Dipstick


  


  


  0.2 mg/dL (0.2


mg/dL) 


 


 


Urine Leukocyte Esterase   Moderate (NEG)  


 


Urine RBC   1-2 /HPF (0-2)  


 


Urine WBC


  


  


  20-40 /HPF


(0-4) 


 


 


Urine Squamous Epithelial


Cells 


  


  Many /LPF 


  


 


 


Urine Bacteria


  


  


  Moderate /HPF


(0-FEW) 


 


 


Urine Opiates Screen   Neg (NEG)  


 


Urine Methadone Screen   Neg (NEG)  


 


Urine Barbiturates   Neg (NEG)  


 


Urine Phencyclidine Screen   Neg (NEG)  


 


Urine


Amphetamine/Methamphetamine 


  


  Neg (NEG) 


  


 


 


Urine Benzodiazepines Screen   Neg (NEG)  


 


Urine Cocaine Screen   Neg (NEG)  


 


Urine Cannabinoids Screen   Neg (NEG)  


 


Urine Ethyl Alcohol   Neg (NEG)  


 


Test


  5/19/17


01:10 


  


  


 


 


White Blood Count


  5.1 x10^3/uL


(4.0-11.0) 


  


  


 


 


Red Blood Count


  2.57 x10^6/uL


(3.50-5.40) 


  


  


 


 


Hemoglobin


  8.1 g/dL


(12.0-15.5) 


  


  


 


 


Hematocrit


  25.9 %


(36.0-47.0) 


  


  


 


 


Mean Corpuscular Volume


  101 fL


() 


  


  


 


 


Mean Corpuscular Hemoglobin 32 pg (25-35)    


 


Mean Corpuscular Hemoglobin


Concent 31 g/dL


(31-37) 


  


  


 


 


Red Cell Distribution Width


  17.1 %


(11.5-14.5) 


  


  


 


 


Platelet Count


  280 x10^3/uL


(140-400) 


  


  


 


 


Neutrophils (%) (Auto) 70 % (31-73)    


 


Lymphocytes (%) (Auto) 15 % (24-48)    


 


Monocytes (%) (Auto) 13 % (0-9)    


 


Eosinophils (%) (Auto) 1 % (0-3)    


 


Basophils (%) (Auto) 0 % (0-3)    


 


Neutrophils # (Auto)


  3.6 x10^3uL


(1.8-7.7) 


  


  


 


 


Lymphocytes # (Auto)


  0.7 x10^3/uL


(1.0-4.8) 


  


  


 


 


Monocytes # (Auto)


  0.7 x10^3/uL


(0.0-1.1) 


  


  


 


 


Eosinophils # (Auto)


  0.1 x10^3/uL


(0.0-0.7) 


  


  


 


 


Basophils # (Auto)


  0.0 x10^3/uL


(0.0-0.2) 


  


  


 


 


Sodium Level


  145 mmol/L


(136-145) 


  


  


 


 


Potassium Level


  4.8 mmol/L


(3.5-5.1) 


  


  


 


 


Chloride Level


  110 mmol/L


() 


  


  


 


 


Carbon Dioxide Level


  24 mmol/L


(21-32) 


  


  


 


 


Anion Gap 11 (6-14)    


 


Blood Urea Nitrogen


  40 mg/dL


(7-20) 


  


  


 


 


Creatinine


  6.2 mg/dL


(0.6-1.0) 


  


  


 


 


Estimated GFR


(Cockcroft-Gault) 8.5 


  


  


  


 


 


Glucose Level


  109 mg/dL


(70-99) 


  


  


 


 


Calcium Level


  9.1 mg/dL


(8.5-10.1) 


  


  


 


 


Troponin I Quantitative


  0.046 ng/mL


(0.000-0.055) 


  


  


 


 


Triglycerides Level


  83 mg/dL


(0-150) 


  


  


 


 


Cholesterol Level


  114 mg/dL


(0-200) 


  


  


 


 


LDL Cholesterol, Calculated


  58 mg/dL


(0-100) 


  


  


 


 


VLDL Cholesterol, Calculated


  17 mg/dL


(0-40) 


  


  


 


 


Non-HDL Cholesterol Calculated


  75 mg/dL


(0-129) 


  


  


 


 


HDL Cholesterol


  39 mg/dL


(40-60) 


  


  


 


 


Cholesterol/HDL Ratio 2.9    








Laboratory Tests








Test


  5/18/17


11:25 5/18/17


13:55 5/18/17


19:05 5/19/17


01:10


 


Sodium Level


  147 mmol/L


(136-145) 


  


  145 mmol/L


(136-145)


 


Potassium Level


  4.3 mmol/L


(3.5-5.1) 


  


  4.8 mmol/L


(3.5-5.1)


 


Chloride Level


  111 mmol/L


() 


  


  110 mmol/L


()


 


Carbon Dioxide Level


  26 mmol/L


(21-32) 


  


  24 mmol/L


(21-32)


 


Anion Gap 10 (6-14)    11 (6-14) 


 


Blood Urea Nitrogen


  31 mg/dL


(7-20) 


  


  40 mg/dL


(7-20)


 


Creatinine


  5.4 mg/dL


(0.6-1.0) 


  


  6.2 mg/dL


(0.6-1.0)


 


Estimated GFR


(Cockcroft-Gault) 10.0 


  


  


  8.5 


 


 


Glucose Level


  105 mg/dL


(70-99) 


  


  109 mg/dL


(70-99)


 


Calcium Level


  9.4 mg/dL


(8.5-10.1) 


  


  9.1 mg/dL


(8.5-10.1)


 


Magnesium Level


  2.0 mg/dL


(1.8-2.4) 


  


  


 


 


Total Bilirubin


  0.6 mg/dL


(0.2-1.0) 


  


  


 


 


Direct Bilirubin


  0.2 mg/dL


(0.0-0.2) 


  


  


 


 


Aspartate Amino Transf


(AST/SGOT) 99 U/L (15-37) 


  


  


  


 


 


Alanine Aminotransferase


(ALT/SGPT) 98 U/L (14-59) 


  


  


  


 


 


Alkaline Phosphatase


  91 U/L


() 


  


  


 


 


Creatine Kinase


  61 U/L


() 


  


  


 


 


Creatine Kinase MB (Mass)


  1.2 ng/mL


(0.0-3.6) 


  


  


 


 


Creatine Kinase MB Relative


Index  % (0-4) 


  


  


  


 


 


Troponin I Quantitative


  0.034 ng/mL


(0.000-0.055) 


  0.063 ng/mL


(0.000-0.055) 0.046 ng/mL


(0.000-0.055)


 


NT-Pro-B-Type Natriuretic


Peptide > 47962 pg/mL


(0-124) 


  


  


 


 


Total Protein


  6.9 g/dL


(6.4-8.2) 


  


  


 


 


Albumin


  2.9 g/dL


(3.4-5.0) 


  


  


 


 


Lipase


  121 U/L


() 


  


  


 


 


Thyroid Stimulating Hormone


(TSH) 1.937 uIU/mL


(0.358-3.74) 


  


  


 


 


Urine Collection Type  Unknown   


 


Urine Color  Yellow   


 


Urine Clarity  Clear   


 


Urine pH  7.5   


 


Urine Specific Gravity  1.010   


 


Urine Protein


  


  100 mg/dL


(NEG-TRACE) 


  


 


 


Urine Glucose (UA)


  


  Negative mg/dL


(NEG) 


  


 


 


Urine Ketones (Stick)


  


  Negative mg/dL


(NEG) 


  


 


 


Urine Blood  Small (NEG)   


 


Urine Nitrite  Negative (NEG)   


 


Urine Bilirubin  Negative (NEG)   


 


Urine Urobilinogen Dipstick


  


  0.2 mg/dL (0.2


mg/dL) 


  


 


 


Urine Leukocyte Esterase  Moderate (NEG)   


 


Urine RBC  1-2 /HPF (0-2)   


 


Urine WBC


  


  20-40 /HPF


(0-4) 


  


 


 


Urine Squamous Epithelial


Cells 


  Many /LPF 


  


  


 


 


Urine Bacteria


  


  Moderate /HPF


(0-FEW) 


  


 


 


Urine Opiates Screen  Neg (NEG)   


 


Urine Methadone Screen  Neg (NEG)   


 


Urine Barbiturates  Neg (NEG)   


 


Urine Phencyclidine Screen  Neg (NEG)   


 


Urine


Amphetamine/Methamphetamine 


  Neg (NEG) 


  


  


 


 


Urine Benzodiazepines Screen  Neg (NEG)   


 


Urine Cocaine Screen  Neg (NEG)   


 


Urine Cannabinoids Screen  Neg (NEG)   


 


Urine Ethyl Alcohol  Neg (NEG)   


 


White Blood Count


  


  


  


  5.1 x10^3/uL


(4.0-11.0)


 


Red Blood Count


  


  


  


  2.57 x10^6/uL


(3.50-5.40)


 


Hemoglobin


  


  


  


  8.1 g/dL


(12.0-15.5)


 


Hematocrit


  


  


  


  25.9 %


(36.0-47.0)


 


Mean Corpuscular Volume


  


  


  


  101 fL


()


 


Mean Corpuscular Hemoglobin    32 pg (25-35) 


 


Mean Corpuscular Hemoglobin


Concent 


  


  


  31 g/dL


(31-37)


 


Red Cell Distribution Width


  


  


  


  17.1 %


(11.5-14.5)


 


Platelet Count


  


  


  


  280 x10^3/uL


(140-400)


 


Neutrophils (%) (Auto)    70 % (31-73) 


 


Lymphocytes (%) (Auto)    15 % (24-48) 


 


Monocytes (%) (Auto)    13 % (0-9) 


 


Eosinophils (%) (Auto)    1 % (0-3) 


 


Basophils (%) (Auto)    0 % (0-3) 


 


Neutrophils # (Auto)


  


  


  


  3.6 x10^3uL


(1.8-7.7)


 


Lymphocytes # (Auto)


  


  


  


  0.7 x10^3/uL


(1.0-4.8)


 


Monocytes # (Auto)


  


  


  


  0.7 x10^3/uL


(0.0-1.1)


 


Eosinophils # (Auto)


  


  


  


  0.1 x10^3/uL


(0.0-0.7)


 


Basophils # (Auto)


  


  


  


  0.0 x10^3/uL


(0.0-0.2)


 


Triglycerides Level


  


  


  


  83 mg/dL


(0-150)


 


Cholesterol Level


  


  


  


  114 mg/dL


(0-200)


 


LDL Cholesterol, Calculated


  


  


  


  58 mg/dL


(0-100)


 


VLDL Cholesterol, Calculated


  


  


  


  17 mg/dL


(0-40)


 


Non-HDL Cholesterol Calculated


  


  


  


  75 mg/dL


(0-129)


 


HDL Cholesterol


  


  


  


  39 mg/dL


(40-60)


 


Cholesterol/HDL Ratio    2.9 








Medications





Current Medications


Hydralazine HCl (Apresoline) 10 mg 1X  ONCE IVP  Last administered on 5/18/17at 

12:16;  Start 5/18/17 at 12:15;  Stop 5/18/17 at 12:16;  Status DC


Morphine Sulfate 2 mg 1X  ONCE IV  Last administered on 5/18/17at 12:17;  Start 

5/18/17 at 12:15;  Stop 5/18/17 at 12:16;  Status DC


Albuterol/ Ipratropium (Duoneb) 3 ml 1X  ONCE NEB  Last administered on 5/18/ 17at 13:04;  Start 5/18/17 at 13:00;  Stop 5/18/17 at 13:01;  Status DC


Furosemide (Lasix) 40 mg 1X  ONCE IVP  Last administered on 5/18/17at 13:08;  

Start 5/18/17 at 13:00;  Stop 5/18/17 at 13:01;  Status DC


Hydralazine HCl (Apresoline) 20 mg 1X  ONCE IVP  Last administered on 5/18/17at 

13:31;  Start 5/18/17 at 13:30;  Stop 5/18/17 at 13:31;  Status DC


Ondansetron HCl (Zofran) 4 mg PRN Q8HRS  PRN IV NAUSEA/VOMITING Last 

administered on 5/18/17at 14:35;  Start 5/18/17 at 13:30;  Stop 5/19/17 at 13:29


Nicardipine HCl 50 mg/Sodium Chloride 270 ml @ 0 mls/hr CONT  PRN IV SEE I/O 

RECORD Last administered on 5/19/17at 02:02;  Start 5/18/17 at 14:15


Amlodipine Besylate (Norvasc) 10 mg DAILY PO ;  Start 5/18/17 at 16:30;  Stop 5/ 18/17 at 16:30;  Status DC


Aspirin (Ecotrin) 81 mg DAILYWBKFT PO  Last administered on 5/19/17at 09:24;  

Start 5/19/17 at 08:00


Atorvastatin Calcium (Lipitor) 20 mg HS PO  Last administered on 5/18/17at 21:16

;  Start 5/18/17 at 21:00


Calcium Acetate (Phoslo) 667 mg TIDWMEALS PO  Last administered on 5/19/17at 09:

24;  Start 5/18/17 at 17:00


Citalopram Hydrobromide (CeleXA) 20 mg DAILY PO  Last administered on 5/19/17at 

09:24;  Start 5/19/17 at 09:00


Clonidine HCl (Catapres) 0.3 mg TID PO  Last administered on 5/19/17at 09:22;  

Start 5/18/17 at 16:30


Vitamin B Complex/ Vitamin C (Bruna-Svetlana) 1 tab DAILY PO  Last administered on 5/ 19/17at 09:22;  Start 5/19/17 at 09:00


Furosemide (Lasix) 40 mg QTUTHSASU@0900 PO ;  Start 5/18/17 at 16:00;  Stop 5/18 /17 at 16:10;  Status DC


Gabapentin (Neurontin) 100 mg BID PO  Last administered on 5/19/17at 09:23;  

Start 5/18/17 at 21:00


Hydralazine HCl (Apresoline) 50 mg TID PO  Last administered on 5/19/17at 09:21

;  Start 5/18/17 at 16:00


Acetaminophen/ Hydrocodone Bitart (Lortab 5/325) 1 tab PRN Q6HRS  PRN PO PAIN;  

Start 5/18/17 at 16:00


Isosorbide Mononitrate (Imdur) 60 mg DAILY PO  Last administered on 5/19/17at 09

:23;  Start 5/19/17 at 09:00


Lisinopril (Prinivil) 20 mg QHS PO  Last administered on 5/18/17at 21:15;  

Start 5/18/17 at 21:00


Lisinopril (Prinivil) 40 mg DAILY PO  Last administered on 5/19/17at 09:24;  

Start 5/18/17 at 16:00


Lorazepam (Ativan) 0.5 mg PRN DAILY  PRN PO ANXIETY / AGITATION;  Start 5/18/17 

at 16:00


Cyclobenzaprine HCl (Flexeril) 5 mg BID PO  Last administered on 5/19/17at 09:22

;  Start 5/18/17 at 21:00


Labetalol HCl (Trandate) 300 mg BID PO  Last administered on 5/19/17at 09:22;  

Start 5/18/17 at 16:00


Ondansetron HCl (Zofran Odt) 4 mg PRN Q8HRS  PRN PO NAUSEA/VOMITING;  Start 5/18 /17 at 16:00


Pantoprazole Sodium (Protonix) 40 mg DAILYAC PO  Last administered on 5/19/17at 

09:24;  Start 5/19/17 at 07:30


Sennosides (Senna) 8.6 mg PRN BID  PRN PO CONSTIPATION;  Start 5/18/17 at 16:00


Amlodipine Besylate (Norvasc) 10 mg DAILY PO  Last administered on 5/19/17at 09:

23;  Start 5/18/17 at 16:30


Furosemide (Lasix) 40 mg QTUTHSASU@0900 PO ;  Start 5/20/17 at 09:00





Active Scripts


Active


Reported


Lorazepam 0.5 Mg Tablet 0.5 Mg PO PRN DAILY


Furosemide 40 Mg Tablet 40 Mg PO QODAY


Hydralazine Hcl 50 Mg Tablet 50 Mg PO TID


Labetalol Hcl 300 Mg Tablet 300 Mg PO BID


Lisinopril 20 Mg Tablet 20 Mg PO QHS


Lisinopril 20 Mg Tablet 40 Mg PO DAILY


Cyclobenzaprine Hcl 5 Mg Tablet 1 Tab PO BID


Hydrocodone-Apap 5-325  ** (Hydrocodone Bit/Acetaminophen) 1 Each Tablet 1 Tab 

PO PRN Q6HRS PRN


Zofran Odt (Ondansetron) 8 Mg Tab.rapdis 8 Mg PO BID PRN


Senna (Sennosides) 8.6 Mg Capsule 8.6 Mg PO PRN PRN


Nephro-Svetlana Tablet (Folic Acid/Vitamin B Comp W-C) 0.8 Mg Tablet 1 Tab PO DAILY


Imdur (Isosorbide Mononitrate) 60 Mg Tab.er.24h 60 Mg PO DAILY


Clonidine Hcl 0.3 Mg Tablet 0.3 Mg PO TID


Norvasc (Amlodipine Besylate) 10 Mg Tablet 10 Mg PO DAILY


Protonix (Pantoprazole Sodium) 40 Mg Granpkt.dr 40 Mg PO DAILY


Neurontin (Gabapentin) 100 Mg Capsule 100 Mg PO BID


Calcium Acetate 667 Mg Capsule 667 Mg PO TIDWMEALS


Citalopram Hbr (Citalopram Hydrobromide) 20 Mg Tablet 20 Mg PO DAILY


Children's Aspirin (Aspirin) 81 Mg Tab.chew 81 Mg PO DAILY


Lipitor (Atorvastatin Calcium) 20 Mg Tablet 20 Mg PO HS


Vitals/I & O





Vital Sign - Last 24 Hours








 5/18/17 5/18/17 5/18/17 5/18/17





 10:52 11:52 12:14 12:16


 


Pulse 76 78 76 77


 


B/P (MAP) 215/101 (139) 209/94 (132) 209/98 (135) 209/98


 


Pulse Ox 93 97 96 


 


O2 Delivery Room Air Room Air Room Air 





 5/18/17 5/18/17 5/18/17 5/18/17





 12:17 12:18 12:20 12:21


 


Pulse  82 86 84


 


Resp 22   


 


B/P (MAP)  237/104 (148) 248/118 (161) 244/112 (156)


 


Pulse Ox 95 96 96 96


 


O2 Delivery Room Air Room Air Room Air Room Air





 5/18/17 5/18/17 5/18/17 5/18/17





 12:22 12:37 12:47 12:52


 


Pulse 82 86  90


 


Resp   24 


 


B/P (MAP) 221/100 (140) 215/99 (137)  212/112 (145)


 


Pulse Ox 96 98 95 98


 


O2 Delivery Room Air Room Air Room Air Room Air


 


O2 Flow Rate    2.0





 5/18/17 5/18/17 5/18/17 5/18/17





 13:07 13:09 13:22 13:29


 


Pulse 90  88 88


 


B/P (MAP) 224/98 (140)  233/109 (150) 214/84 (127)


 


Pulse Ox 100 93 100 100


 


O2 Delivery Nasal Cannula Room Air Nasal Cannula Nasal Cannula


 


O2 Flow Rate 2.0  2.0 2.0





 5/18/17 5/18/17 5/18/17 5/18/17





 13:31 13:31 13:32 13:34


 


Pulse 86 86 86 88


 


B/P (MAP) 214/84 218/116 (150) 231/104 (146) 222/102 (142)


 


Pulse Ox  100 100 100


 


O2 Delivery  Nasal Cannula Nasal Cannula Nasal Cannula


 


O2 Flow Rate  2.0 2.0 2.0





 5/18/17 5/18/17 5/18/17 5/18/17





 13:35 13:36 13:37 13:55


 


Pulse 86 88 84 90


 


B/P (MAP) 227/103 (144) 218/100 (139) 216/99 (138) 229/97 (141)


 


Pulse Ox 100 100 100 100


 


O2 Delivery Nasal Cannula Nasal Cannula Nasal Cannula Nasal Cannula


 


O2 Flow Rate 2.0 2.0 2.0 2.0





 5/18/17 5/18/17 5/18/17 5/18/17





 14:07 14:26 14:37 16:00


 


Pulse 94 98 88 


 


B/P (MAP) 224/100 (141) 204/93 (130) 194/90 (124) 


 


Pulse Ox 100 100 100 


 


O2 Delivery Nasal Cannula Nasal Cannula Nasal Cannula Nasal Cannula


 


O2 Flow Rate 2.0 2.0 2.0 2.0





 5/18/17 5/18/17 5/18/17 5/18/17





 16:49 16:50 16:50 16:51


 


B/P (MAP) 185/85 185/85 185/85 185/85





 5/18/17 5/18/17 5/18/17 5/18/17





 16:51 19:00 20:00 20:00


 


Temp  98.3  





  98.3  


 


Pulse 94 79  


 


Resp  20  


 


B/P (MAP)  158/70 (99) 163/73 (103) 


 


Pulse Ox  97  


 


O2 Delivery  Nasal Cannula  Nasal Cannula


 


O2 Flow Rate  2.0  2.0


 


    





    





 5/18/17 5/18/17 5/18/17 5/19/17





 21:00 22:00 23:12 00:00


 


Temp   98.5 





   98.5 


 


Pulse   70 


 


Resp   18 


 


B/P (MAP) 162/79 (106) 172/79 (110) 151/67 (95) 128/64 (85)


 


Pulse Ox   99 


 


O2 Delivery   Nasal Cannula 


 


O2 Flow Rate   2.0 


 


    





    





 5/19/17 5/19/17 5/19/17 5/19/17





 01:00 02:00 03:00 04:00


 


Pulse   69 


 


Resp   20 


 


B/P (MAP) 122/62 (82) 141/68 (92) 132/72 (92) 143/71 (95)


 


Pulse Ox   93 


 


O2 Delivery   Nasal Cannula 


 


O2 Flow Rate   2.0 





 5/19/17 5/19/17 5/19/17 5/19/17





 07:00 09:21 09:22 09:22


 


Temp 98.1   





 98.1   


 


Pulse 72   74


 


Resp 14   


 


B/P (MAP) 149/70 (96) 162/76 162/76 


 


Pulse Ox 95   


 


O2 Delivery Nasal Cannula   


 


O2 Flow Rate 2.0   


 


    





    





 5/19/17 5/19/17 5/19/17 





 09:23 09:23 09:24 


 


Pulse 74   


 


B/P (MAP)  162/76 162/76 














Intake and Output   


 


 5/18/17 5/18/17 5/19/17





 15:00 23:00 07:00


 


Intake Total   640 ml


 


Output Total 120 ml 250 ml 0 ml


 


Balance -120 ml -250 ml 640 ml

















UMM FERNANDEZ MD May 19, 2017 10:51

## 2017-05-19 NOTE — PDOC
Provider Note


Provider Note


dictated


r/o sig pericardial effusion











HUSEYIN YUAN MD May 19, 2017 11:58

## 2017-05-19 NOTE — CONS
DATE OF CONSULTATION:  



ATTENDING PHYSICIAN:  Dr. León.



REASON FOR CONSULTATION:  Dyspnea.



HISTORY OF PRESENT ILLNESS:  The patient is a 51-year-old -American

female with history of end-stage renal disease, on hemodialysis.  She was

brought in to the hospital with a 2-week history of progressive shortness of

breath.  She has some chest pains as well.  She said she has been dialyzed, but

it did not help her symptoms.  She said she was ____ as a result, fluids were

not ____.  She had some viral illness about 2 weeks ago.  The patient's chest

x-ray was reviewed and it shows mildly prominent interstitial marking; however,

the cardiac silhouette was significantly enlarged.  She had an echocardiogram

done in 02/2016.  At that time, she was noted to have moderate pericardial

effusion.  The pulmonary artery systolic pressure was 39 and she had grade 2

diastolic dysfunction, EF was 55-60%.  She has no history of tobacco use.  She

was also hypertensive when she was seen in the ER.



PAST MEDICAL HISTORY:  Significant for anxiety, history of CAD, CHF, depression,

history of moderate pericardial effusion on previous echo, GERD, dyslipidemia,

hypertension and end-stage renal disease, on hemodialysis.



PAST SURGICAL HISTORY:  Fistula to the right arm.



ALLERGIES:  None.



SOCIAL HISTORY:  Nonsmoker.



MEDICATIONS:  All reviewed as listed in the MRAD.



REVIEW OF SYSTEMS:  Twelve-point system obtained.  Pertinent positives discussed

in my history of present illness, otherwise noncontributory.  All systems that

were negative were reviewed as well.



FAMILY HISTORY:  Noncontributory.



PHYSICAL EXAMINATION:

VITAL SIGNS:  Blood pressure 162/76, pulse ox 95% on 2 liters, afebrile.

HEENT:  Sclerae nonicteric.

NECK:  Supple.

LUNGS:  Diminished breath sounds.

CARDIOVASCULAR:  Regular rate and rhythm.

ABDOMEN:  Soft, nontender.

EXTREMITIES:  With trace pitting edema.



LABORATORY DATA:  Reviewed.  White cell count 5.1, hemoglobin 8.1, platelets are

280.  BUN ____ and creatinine 6.2.



IMPRESSION:

1.  Progressive dyspnea in a patient with end-stage renal disease and has

significantly enlarged cardiac silhouette and also mildly prominent interstitial

markings.  She has a history of moderate pericardial effusion in February 2016. 

The following are the possibilities;

a.  Rule out worsening pericardial effusion/ early temponade

2.  Interstitial edema contributing to the patient's symptoms.

3.  No significant history of tobacco use.

4.  End-stage renal disease, on hemodialysis.  Possibility of uremic

pericarditis cannot be ruled out.



RECOMMENDATIONS:

1.  Discussed with the Cardiology and nurse practitioner, we will do a stat

echocardiogram to rule out any worsening pericardial effusion.

2.  Follow cardiology recommendation.

3.  Hemodialysis with ultrafiltration to be performed once significant

pericardial effusion is ruled out.

4.  Follow renal recommendations.

5.  Continue anti-ischemic medications per Cardiology.

6.  Discussed with RN and will follow along with you.

 



______________________________

HUSEYIN YUAN MD



DR:  EDIE/[facility]  JOB#:  [id number] / [id number]

DD:  05/19/2017 11:12  DT:  05/19/2017 11:49

NAS

## 2017-05-19 NOTE — PDOC2
CONSULT


Date of Consult


Date of Consult


DATE: 5/19/17 


TIME: 13:23





Reason for Consult


Reason for Consult:


ESRD





Referring Physician


Referring Physician:


KAROLYN DOUGLAS IN ER





Identification/Chief Complaint


Chief Complaint


SOB


Problems:  





Source


Source:  Chart review, Patient





History of Present Illness


Reason for Visit:


as dictated





Past Medical History


Cardiovascular:  CAD, CHF (cardiomyopathy), HTN, Hyperlipidemia


Pulmonary:  Other


CENTRAL NERVOUS SYSTEM:  Other (headache)


GI:  Constipation, GERD, Other (diarrhea)


Heme/Onc:  Anemia NOS


Psych:  Anxiety, Depression


Musculoskeletal:   low back pain


Rheumatologic:  Rheumatoid arthritis


ENT:  No pertinent hx


Renal/:  Chronic renal failure (dialysis MWF)


Dermatology:  No pertinent hx





Past Surgical History


Past Surgical History:  Other (dialysis fistula)





Family History


Family History:  Hypertension





Social History


No


ALCOHOL:  none


Drugs:  None


Lives:  with Family


Domestic Violence:  Neg





Current Problem List


Problem List


Problems


Medical Problems:


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


Status: Acute  











Current Medications


Current Medications





Current Medications


Hydralazine HCl (Apresoline) 10 mg 1X  ONCE IVP  Last administered on 5/18/17at 

12:16;  Start 5/18/17 at 12:15;  Stop 5/18/17 at 12:16;  Status DC


Morphine Sulfate 2 mg 1X  ONCE IV  Last administered on 5/18/17at 12:17;  Start 

5/18/17 at 12:15;  Stop 5/18/17 at 12:16;  Status DC


Albuterol/ Ipratropium (Duoneb) 3 ml 1X  ONCE NEB  Last administered on 5/18/ 17at 13:04;  Start 5/18/17 at 13:00;  Stop 5/18/17 at 13:01;  Status DC


Furosemide (Lasix) 40 mg 1X  ONCE IVP  Last administered on 5/18/17at 13:08;  

Start 5/18/17 at 13:00;  Stop 5/18/17 at 13:01;  Status DC


Hydralazine HCl (Apresoline) 20 mg 1X  ONCE IVP  Last administered on 5/18/17at 

13:31;  Start 5/18/17 at 13:30;  Stop 5/18/17 at 13:31;  Status DC


Ondansetron HCl (Zofran) 4 mg PRN Q8HRS  PRN IV NAUSEA/VOMITING Last 

administered on 5/18/17at 14:35;  Start 5/18/17 at 13:30;  Stop 5/19/17 at 13:29


Nicardipine HCl 50 mg/Sodium Chloride 270 ml @ 0 mls/hr CONT  PRN IV SEE I/O 

RECORD Last administered on 5/19/17at 02:02;  Start 5/18/17 at 14:15;  Stop 5/19 /17 at 12:00;  Status DC


Amlodipine Besylate (Norvasc) 10 mg DAILY PO ;  Start 5/18/17 at 16:30;  Stop 5/ 18/17 at 16:30;  Status DC


Aspirin (Ecotrin) 81 mg DAILYWBKFT PO  Last administered on 5/19/17at 09:24;  

Start 5/19/17 at 08:00


Atorvastatin Calcium (Lipitor) 20 mg HS PO  Last administered on 5/18/17at 21:16

;  Start 5/18/17 at 21:00


Calcium Acetate (Phoslo) 667 mg TIDWMEALS PO  Last administered on 5/19/17at 12:

45;  Start 5/18/17 at 17:00


Citalopram Hydrobromide (CeleXA) 20 mg DAILY PO  Last administered on 5/19/17at 

09:24;  Start 5/19/17 at 09:00


Clonidine HCl (Catapres) 0.3 mg TID PO  Last administered on 5/19/17at 09:22;  

Start 5/18/17 at 16:30


Vitamin B Complex/ Vitamin C (Bruna-Svetlana) 1 tab DAILY PO  Last administered on 5/ 19/17at 09:22;  Start 5/19/17 at 09:00


Furosemide (Lasix) 40 mg QTUTHSASU@0900 PO ;  Start 5/18/17 at 16:00;  Stop 5/18 /17 at 16:10;  Status DC


Gabapentin (Neurontin) 100 mg BID PO  Last administered on 5/19/17at 09:23;  

Start 5/18/17 at 21:00


Hydralazine HCl (Apresoline) 50 mg TID PO  Last administered on 5/19/17at 09:21

;  Start 5/18/17 at 16:00


Acetaminophen/ Hydrocodone Bitart (Lortab 5/325) 1 tab PRN Q6HRS  PRN PO PAIN;  

Start 5/18/17 at 16:00


Isosorbide Mononitrate (Imdur) 60 mg DAILY PO  Last administered on 5/19/17at 09

:23;  Start 5/19/17 at 09:00


Lisinopril (Prinivil) 20 mg QHS PO  Last administered on 5/18/17at 21:15;  

Start 5/18/17 at 21:00


Lisinopril (Prinivil) 40 mg DAILY PO  Last administered on 5/19/17at 09:24;  

Start 5/18/17 at 16:00


Lorazepam (Ativan) 0.5 mg PRN DAILY  PRN PO ANXIETY / AGITATION;  Start 5/18/17 

at 16:00


Cyclobenzaprine HCl (Flexeril) 5 mg BID PO  Last administered on 5/19/17at 09:22

;  Start 5/18/17 at 21:00


Labetalol HCl (Trandate) 300 mg BID PO  Last administered on 5/19/17at 09:22;  

Start 5/18/17 at 16:00


Ondansetron HCl (Zofran Odt) 4 mg PRN Q8HRS  PRN PO NAUSEA/VOMITING;  Start 5/18 /17 at 16:00


Pantoprazole Sodium (Protonix) 40 mg DAILYAC PO  Last administered on 5/19/17at 

09:24;  Start 5/19/17 at 07:30


Sennosides (Senna) 8.6 mg PRN BID  PRN PO CONSTIPATION;  Start 5/18/17 at 16:00


Amlodipine Besylate (Norvasc) 10 mg DAILY PO  Last administered on 5/19/17at 09:

23;  Start 5/18/17 at 16:30


Furosemide (Lasix) 40 mg QTUTHSASU@0900 PO ;  Start 5/20/17 at 09:00





Active Scripts


Active


Reported


Lorazepam 0.5 Mg Tablet 0.5 Mg PO PRN DAILY


Furosemide 40 Mg Tablet 40 Mg PO QODAY


Hydralazine Hcl 50 Mg Tablet 50 Mg PO TID


Labetalol Hcl 300 Mg Tablet 300 Mg PO BID


Lisinopril 20 Mg Tablet 20 Mg PO QHS


Lisinopril 20 Mg Tablet 40 Mg PO DAILY


Cyclobenzaprine Hcl 5 Mg Tablet 1 Tab PO BID


Hydrocodone-Apap 5-325  ** (Hydrocodone Bit/Acetaminophen) 1 Each Tablet 1 Tab 

PO PRN Q6HRS PRN


Zofran Odt (Ondansetron) 8 Mg Tab.rapdis 8 Mg PO BID PRN


Senna (Sennosides) 8.6 Mg Capsule 8.6 Mg PO PRN PRN


Nephro-Svetlana Tablet (Folic Acid/Vitamin B Comp W-C) 0.8 Mg Tablet 1 Tab PO DAILY


Imdur (Isosorbide Mononitrate) 60 Mg Tab.er.24h 60 Mg PO DAILY


Clonidine Hcl 0.3 Mg Tablet 0.3 Mg PO TID


Norvasc (Amlodipine Besylate) 10 Mg Tablet 10 Mg PO DAILY


Protonix (Pantoprazole Sodium) 40 Mg Granpkt.dr 40 Mg PO DAILY


Neurontin (Gabapentin) 100 Mg Capsule 100 Mg PO BID


Calcium Acetate 667 Mg Capsule 667 Mg PO TIDWMEALS


Citalopram Hbr (Citalopram Hydrobromide) 20 Mg Tablet 20 Mg PO DAILY


Children's Aspirin (Aspirin) 81 Mg Tab.chew 81 Mg PO DAILY


Lipitor (Atorvastatin Calcium) 20 Mg Tablet 20 Mg PO HS





Allergies


Allergies:  


Coded Allergies:  


     No Known Drug Allergies (Unverified , 10/20/16)





ROS


Review of System


   GEN:      no Fevers      no Chills


   EYES:      + Visual Complaints (she cant tell me if its new or ch) - usually 

asso with ^ed BP


   ENT:      no EN Drainage      no Hearing deficiets   +ve ringing with ^ed BP


   CVS:      no Orthopnea   (alying flat currently)   no CP


   RESP:      + SOB      min DENNY


   GI:      no Nausea      no Vomiting


   :      no Dysuria      no Urgency


   HEME:      no easy bruising      no Palp Ly Nodes


   NEURO      no Focal Weakness   no Sz


   PSYCH:   no Suicidal Ideation   no Depression


   SKIN:      no Rashes


   ENDO:      no Polyuria or Polydipsia   no Hot/Cold Intolerance


   MU SK:      no Arthraigia      no Myalgia





Physical Exam


Physical Exam





General Appearance:       Awake      Alert Oriented x  3   In  min resp  

Distress


Eyes:       Sclera Anciteric  Conjunctiva Normal


EN:       No EN Drainage  Mucous Memb.   moist


Neck:         no  JVD   no  JVP  Supple   no  Thyromegaly


CVS:       S1 S2   +  Murmur  No Gallop  No Rub   no Edema


Resp:        rare basal   Rales   no  Rhonchi   no  Acc. Muscle use


GI:       BAS +ve    NO Bruit   Non Tender   Non Distended


:        no  CVA tenderness;    no  Suprapubic Tenderness


SKIN:        no  Rashes  Breast Exam deferred


Mu.Sk:       Adequate ROM    no  Muscle Atrophy


Heme:       Unable to palpate Obvious LAD  no  Splenomegaly


NEURO:       Good Strength and Tone   Cranial Nerves II - XII grossly intact


Psych:        no  Depressed    o  Active hallucination


Vital Signs





Vital Signs








  Date Time  Temp Pulse Resp B/P (MAP) Pulse Ox O2 Delivery O2 Flow Rate FiO2


 


5/19/17 11:28 98.3 72 18 145/68 (93) 96 Room Air  





 98.3       


 


5/19/17 08:00       2.0 








Assessment & Plan


ESRD:   Dialysis as below





F 180 NR 3.0 Hrs





3 K 2.5 Ca 140 Na 40    HC03





Qb 350 + Qd 500+





Heparin  0 Units





Uf 3  Kgs or to dry weight as tolerated





May give 25-50 gms of 25% Albumin if needed to maintain Hemodynamic stability





Treatment plan reviewed and discussed with Dialysis RN 











Anemia:   start Epogen as ordered;  Transfuse  with next HD as needed. reval 

after hemoconcentration





Accel. HTN:   better with Cardene gtt. watch on Current BP meds as reviewed.  





Bone & Mineral: follow phos and reval binder regimen





Fl Overload - Uf with HD as ace today





Discussed Plan of Care and prognosis etc. at length with family.





Labs


Labs





Laboratory Tests








Test


  5/18/17


10:43 5/18/17


11:25 5/18/17


13:55 5/18/17


19:05


 


White Blood Count


  4.6 x10^3/uL


(4.0-11.0) 


  


  


 


 


Red Blood Count


  2.61 x10^6/uL


(3.50-5.40) 


  


  


 


 


Hemoglobin


  8.3 g/dL


(12.0-15.5) 


  


  


 


 


Hematocrit


  26.4 %


(36.0-47.0) 


  


  


 


 


Mean Corpuscular Volume


  101 fL


() 


  


  


 


 


Mean Corpuscular Hemoglobin 32 pg (25-35)    


 


Mean Corpuscular Hemoglobin


Concent 31 g/dL


(31-37) 


  


  


 


 


Red Cell Distribution Width


  16.9 %


(11.5-14.5) 


  


  


 


 


Platelet Count


  300 x10^3/uL


(140-400) 


  


  


 


 


Neutrophils (%) (Auto) 59 % (31-73)    


 


Lymphocytes (%) (Auto) 21 % (24-48)    


 


Monocytes (%) (Auto) 14 % (0-9)    


 


Eosinophils (%) (Auto) 5 % (0-3)    


 


Basophils (%) (Auto) 1 % (0-3)    


 


Neutrophils # (Auto)


  2.7 x10^3uL


(1.8-7.7) 


  


  


 


 


Lymphocytes # (Auto)


  1.0 x10^3/uL


(1.0-4.8) 


  


  


 


 


Monocytes # (Auto)


  0.6 x10^3/uL


(0.0-1.1) 


  


  


 


 


Eosinophils # (Auto)


  0.2 x10^3/uL


(0.0-0.7) 


  


  


 


 


Basophils # (Auto)


  0.0 x10^3/uL


(0.0-0.2) 


  


  


 


 


Prothrombin Time


  13.7 SEC


(11.7-14.0) 


  


  


 


 


Prothromb Time International


Ratio 1.1 (0.8-1.1) 


  


  


  


 


 


Sodium Level


  


  147 mmol/L


(136-145) 


  


 


 


Potassium Level


  


  4.3 mmol/L


(3.5-5.1) 


  


 


 


Chloride Level


  


  111 mmol/L


() 


  


 


 


Carbon Dioxide Level


  


  26 mmol/L


(21-32) 


  


 


 


Anion Gap  10 (6-14)   


 


Blood Urea Nitrogen


  


  31 mg/dL


(7-20) 


  


 


 


Creatinine


  


  5.4 mg/dL


(0.6-1.0) 


  


 


 


Estimated GFR


(Cockcroft-Gault) 


  10.0 


  


  


 


 


Glucose Level


  


  105 mg/dL


(70-99) 


  


 


 


Calcium Level


  


  9.4 mg/dL


(8.5-10.1) 


  


 


 


Magnesium Level


  


  2.0 mg/dL


(1.8-2.4) 


  


 


 


Total Bilirubin


  


  0.6 mg/dL


(0.2-1.0) 


  


 


 


Direct Bilirubin


  


  0.2 mg/dL


(0.0-0.2) 


  


 


 


Aspartate Amino Transf


(AST/SGOT) 


  99 U/L (15-37) 


  


  


 


 


Alanine Aminotransferase


(ALT/SGPT) 


  98 U/L (14-59) 


  


  


 


 


Alkaline Phosphatase


  


  91 U/L


() 


  


 


 


Creatine Kinase


  


  61 U/L


() 


  


 


 


Creatine Kinase MB (Mass)


  


  1.2 ng/mL


(0.0-3.6) 


  


 


 


Creatine Kinase MB Relative


Index 


   % (0-4) 


  


  


 


 


Troponin I Quantitative


  


  0.034 ng/mL


(0.000-0.055) 


  0.063 ng/mL


(0.000-0.055)


 


NT-Pro-B-Type Natriuretic


Peptide 


  > 67804 pg/mL


(0-124) 


  


 


 


Total Protein


  


  6.9 g/dL


(6.4-8.2) 


  


 


 


Albumin


  


  2.9 g/dL


(3.4-5.0) 


  


 


 


Lipase


  


  121 U/L


() 


  


 


 


Thyroid Stimulating Hormone


(TSH) 


  1.937 uIU/mL


(0.358-3.74) 


  


 


 


Urine Collection Type   Unknown  


 


Urine Color   Yellow  


 


Urine Clarity   Clear  


 


Urine pH   7.5  


 


Urine Specific Gravity   1.010  


 


Urine Protein


  


  


  100 mg/dL


(NEG-TRACE) 


 


 


Urine Glucose (UA)


  


  


  Negative mg/dL


(NEG) 


 


 


Urine Ketones (Stick)


  


  


  Negative mg/dL


(NEG) 


 


 


Urine Blood   Small (NEG)  


 


Urine Nitrite   Negative (NEG)  


 


Urine Bilirubin   Negative (NEG)  


 


Urine Urobilinogen Dipstick


  


  


  0.2 mg/dL (0.2


mg/dL) 


 


 


Urine Leukocyte Esterase   Moderate (NEG)  


 


Urine RBC   1-2 /HPF (0-2)  


 


Urine WBC


  


  


  20-40 /HPF


(0-4) 


 


 


Urine Squamous Epithelial


Cells 


  


  Many /LPF 


  


 


 


Urine Bacteria


  


  


  Moderate /HPF


(0-FEW) 


 


 


Urine Opiates Screen   Neg (NEG)  


 


Urine Methadone Screen   Neg (NEG)  


 


Urine Barbiturates   Neg (NEG)  


 


Urine Phencyclidine Screen   Neg (NEG)  


 


Urine


Amphetamine/Methamphetamine 


  


  Neg (NEG) 


  


 


 


Urine Benzodiazepines Screen   Neg (NEG)  


 


Urine Cocaine Screen   Neg (NEG)  


 


Urine Cannabinoids Screen   Neg (NEG)  


 


Urine Ethyl Alcohol   Neg (NEG)  


 


Test


  5/19/17


01:10 


  


  


 


 


White Blood Count


  5.1 x10^3/uL


(4.0-11.0) 


  


  


 


 


Red Blood Count


  2.57 x10^6/uL


(3.50-5.40) 


  


  


 


 


Hemoglobin


  8.1 g/dL


(12.0-15.5) 


  


  


 


 


Hematocrit


  25.9 %


(36.0-47.0) 


  


  


 


 


Mean Corpuscular Volume


  101 fL


() 


  


  


 


 


Mean Corpuscular Hemoglobin 32 pg (25-35)    


 


Mean Corpuscular Hemoglobin


Concent 31 g/dL


(31-37) 


  


  


 


 


Red Cell Distribution Width


  17.1 %


(11.5-14.5) 


  


  


 


 


Platelet Count


  280 x10^3/uL


(140-400) 


  


  


 


 


Neutrophils (%) (Auto) 70 % (31-73)    


 


Lymphocytes (%) (Auto) 15 % (24-48)    


 


Monocytes (%) (Auto) 13 % (0-9)    


 


Eosinophils (%) (Auto) 1 % (0-3)    


 


Basophils (%) (Auto) 0 % (0-3)    


 


Neutrophils # (Auto)


  3.6 x10^3uL


(1.8-7.7) 


  


  


 


 


Lymphocytes # (Auto)


  0.7 x10^3/uL


(1.0-4.8) 


  


  


 


 


Monocytes # (Auto)


  0.7 x10^3/uL


(0.0-1.1) 


  


  


 


 


Eosinophils # (Auto)


  0.1 x10^3/uL


(0.0-0.7) 


  


  


 


 


Basophils # (Auto)


  0.0 x10^3/uL


(0.0-0.2) 


  


  


 


 


Sodium Level


  145 mmol/L


(136-145) 


  


  


 


 


Potassium Level


  4.8 mmol/L


(3.5-5.1) 


  


  


 


 


Chloride Level


  110 mmol/L


() 


  


  


 


 


Carbon Dioxide Level


  24 mmol/L


(21-32) 


  


  


 


 


Anion Gap 11 (6-14)    


 


Blood Urea Nitrogen


  40 mg/dL


(7-20) 


  


  


 


 


Creatinine


  6.2 mg/dL


(0.6-1.0) 


  


  


 


 


Estimated GFR


(Cockcroft-Gault) 8.5 


  


  


  


 


 


Glucose Level


  109 mg/dL


(70-99) 


  


  


 


 


Calcium Level


  9.1 mg/dL


(8.5-10.1) 


  


  


 


 


Troponin I Quantitative


  0.046 ng/mL


(0.000-0.055) 


  


  


 


 


Triglycerides Level


  83 mg/dL


(0-150) 


  


  


 


 


Cholesterol Level


  114 mg/dL


(0-200) 


  


  


 


 


LDL Cholesterol, Calculated


  58 mg/dL


(0-100) 


  


  


 


 


VLDL Cholesterol, Calculated


  17 mg/dL


(0-40) 


  


  


 


 


Non-HDL Cholesterol Calculated


  75 mg/dL


(0-129) 


  


  


 


 


HDL Cholesterol


  39 mg/dL


(40-60) 


  


  


 


 


Cholesterol/HDL Ratio 2.9    








Laboratory Tests








Test


  5/18/17


13:55 5/18/17


19:05 5/19/17


01:10


 


Urine Collection Type Unknown   


 


Urine Color Yellow   


 


Urine Clarity Clear   


 


Urine pH 7.5   


 


Urine Specific Gravity 1.010   


 


Urine Protein


  100 mg/dL


(NEG-TRACE) 


  


 


 


Urine Glucose (UA)


  Negative mg/dL


(NEG) 


  


 


 


Urine Ketones (Stick)


  Negative mg/dL


(NEG) 


  


 


 


Urine Blood Small (NEG)   


 


Urine Nitrite Negative (NEG)   


 


Urine Bilirubin Negative (NEG)   


 


Urine Urobilinogen Dipstick


  0.2 mg/dL (0.2


mg/dL) 


  


 


 


Urine Leukocyte Esterase Moderate (NEG)   


 


Urine RBC 1-2 /HPF (0-2)   


 


Urine WBC


  20-40 /HPF


(0-4) 


  


 


 


Urine Squamous Epithelial


Cells Many /LPF 


  


  


 


 


Urine Bacteria


  Moderate /HPF


(0-FEW) 


  


 


 


Urine Opiates Screen Neg (NEG)   


 


Urine Methadone Screen Neg (NEG)   


 


Urine Barbiturates Neg (NEG)   


 


Urine Phencyclidine Screen Neg (NEG)   


 


Urine


Amphetamine/Methamphetamine Neg (NEG) 


  


  


 


 


Urine Benzodiazepines Screen Neg (NEG)   


 


Urine Cocaine Screen Neg (NEG)   


 


Urine Cannabinoids Screen Neg (NEG)   


 


Urine Ethyl Alcohol Neg (NEG)   


 


Troponin I Quantitative


  


  0.063 ng/mL


(0.000-0.055) 0.046 ng/mL


(0.000-0.055)


 


White Blood Count


  


  


  5.1 x10^3/uL


(4.0-11.0)


 


Red Blood Count


  


  


  2.57 x10^6/uL


(3.50-5.40)


 


Hemoglobin


  


  


  8.1 g/dL


(12.0-15.5)


 


Hematocrit


  


  


  25.9 %


(36.0-47.0)


 


Mean Corpuscular Volume


  


  


  101 fL


()


 


Mean Corpuscular Hemoglobin   32 pg (25-35) 


 


Mean Corpuscular Hemoglobin


Concent 


  


  31 g/dL


(31-37)


 


Red Cell Distribution Width


  


  


  17.1 %


(11.5-14.5)


 


Platelet Count


  


  


  280 x10^3/uL


(140-400)


 


Neutrophils (%) (Auto)   70 % (31-73) 


 


Lymphocytes (%) (Auto)   15 % (24-48) 


 


Monocytes (%) (Auto)   13 % (0-9) 


 


Eosinophils (%) (Auto)   1 % (0-3) 


 


Basophils (%) (Auto)   0 % (0-3) 


 


Neutrophils # (Auto)


  


  


  3.6 x10^3uL


(1.8-7.7)


 


Lymphocytes # (Auto)


  


  


  0.7 x10^3/uL


(1.0-4.8)


 


Monocytes # (Auto)


  


  


  0.7 x10^3/uL


(0.0-1.1)


 


Eosinophils # (Auto)


  


  


  0.1 x10^3/uL


(0.0-0.7)


 


Basophils # (Auto)


  


  


  0.0 x10^3/uL


(0.0-0.2)


 


Sodium Level


  


  


  145 mmol/L


(136-145)


 


Potassium Level


  


  


  4.8 mmol/L


(3.5-5.1)


 


Chloride Level


  


  


  110 mmol/L


()


 


Carbon Dioxide Level


  


  


  24 mmol/L


(21-32)


 


Anion Gap   11 (6-14) 


 


Blood Urea Nitrogen


  


  


  40 mg/dL


(7-20)


 


Creatinine


  


  


  6.2 mg/dL


(0.6-1.0)


 


Estimated GFR


(Cockcroft-Gault) 


  


  8.5 


 


 


Glucose Level


  


  


  109 mg/dL


(70-99)


 


Calcium Level


  


  


  9.1 mg/dL


(8.5-10.1)


 


Triglycerides Level


  


  


  83 mg/dL


(0-150)


 


Cholesterol Level


  


  


  114 mg/dL


(0-200)


 


LDL Cholesterol, Calculated


  


  


  58 mg/dL


(0-100)


 


VLDL Cholesterol, Calculated


  


  


  17 mg/dL


(0-40)


 


Non-HDL Cholesterol Calculated


  


  


  75 mg/dL


(0-129)


 


HDL Cholesterol


  


  


  39 mg/dL


(40-60)


 


Cholesterol/HDL Ratio   2.9 











Images


Images


Moderate cardiomegaly unchanged. Moderate prominent bilateral interstitial


lung markings likely congestive changes.





Impression:





Moderate congestive changes.











CESARIO CHRISTOPHER MD May 19, 2017 13:25

## 2017-05-19 NOTE — PDOC
CARDIO Progress Notes


Date and Time


Date of Service


5/19/2017


Time of Evaluation


1110





Subjective


Subjective:  No Chest Pain, No Palpitations, No Dizziness, Other (Feels tired 

and still miserable. Also a little SOA)





Vitals


Vitals





Vital Signs








  Date Time  Temp Pulse Resp B/P (MAP) Pulse Ox O2 Delivery O2 Flow Rate FiO2


 


5/19/17 11:28 98.3 72 18 145/68 (93) 96 Room Air  





 98.3       


 


5/19/17 08:00       2.0 








Weight


Weight [ ]





Input and Output


Intake and Output











Intake and Output 


 


 5/19/17





 07:00


 


Intake Total 640 ml


 


Output Total 370 ml


 


Balance 270 ml


 


 


 


Intake Oral 400 ml


 


IV Total 240 ml


 


Output Urine Total 370 ml











Laboratory


Labs





Laboratory Tests








Test


  5/18/17


13:55 5/18/17


19:05 5/19/17


01:10


 


Urine Collection Type Unknown   


 


Urine Color Yellow   


 


Urine Clarity Clear   


 


Urine pH 7.5   


 


Urine Specific Gravity 1.010   


 


Urine Protein


  100 mg/dL


(NEG-TRACE) 


  


 


 


Urine Glucose (UA)


  Negative mg/dL


(NEG) 


  


 


 


Urine Ketones (Stick)


  Negative mg/dL


(NEG) 


  


 


 


Urine Blood Small (NEG)   


 


Urine Nitrite Negative (NEG)   


 


Urine Bilirubin Negative (NEG)   


 


Urine Urobilinogen Dipstick


  0.2 mg/dL (0.2


mg/dL) 


  


 


 


Urine Leukocyte Esterase Moderate (NEG)   


 


Urine RBC 1-2 /HPF (0-2)   


 


Urine WBC


  20-40 /HPF


(0-4) 


  


 


 


Urine Squamous Epithelial


Cells Many /LPF 


  


  


 


 


Urine Bacteria


  Moderate /HPF


(0-FEW) 


  


 


 


Urine Opiates Screen Neg (NEG)   


 


Urine Methadone Screen Neg (NEG)   


 


Urine Barbiturates Neg (NEG)   


 


Urine Phencyclidine Screen Neg (NEG)   


 


Urine


Amphetamine/Methamphetamine Neg (NEG) 


  


  


 


 


Urine Benzodiazepines Screen Neg (NEG)   


 


Urine Cocaine Screen Neg (NEG)   


 


Urine Cannabinoids Screen Neg (NEG)   


 


Urine Ethyl Alcohol Neg (NEG)   


 


Troponin I Quantitative


  


  0.063 ng/mL


(0.000-0.055) 0.046 ng/mL


(0.000-0.055)


 


White Blood Count


  


  


  5.1 x10^3/uL


(4.0-11.0)


 


Red Blood Count


  


  


  2.57 x10^6/uL


(3.50-5.40)


 


Hemoglobin


  


  


  8.1 g/dL


(12.0-15.5)


 


Hematocrit


  


  


  25.9 %


(36.0-47.0)


 


Mean Corpuscular Volume


  


  


  101 fL


()


 


Mean Corpuscular Hemoglobin   32 pg (25-35) 


 


Mean Corpuscular Hemoglobin


Concent 


  


  31 g/dL


(31-37)


 


Red Cell Distribution Width


  


  


  17.1 %


(11.5-14.5)


 


Platelet Count


  


  


  280 x10^3/uL


(140-400)


 


Neutrophils (%) (Auto)   70 % (31-73) 


 


Lymphocytes (%) (Auto)   15 % (24-48) 


 


Monocytes (%) (Auto)   13 % (0-9) 


 


Eosinophils (%) (Auto)   1 % (0-3) 


 


Basophils (%) (Auto)   0 % (0-3) 


 


Neutrophils # (Auto)


  


  


  3.6 x10^3uL


(1.8-7.7)


 


Lymphocytes # (Auto)


  


  


  0.7 x10^3/uL


(1.0-4.8)


 


Monocytes # (Auto)


  


  


  0.7 x10^3/uL


(0.0-1.1)


 


Eosinophils # (Auto)


  


  


  0.1 x10^3/uL


(0.0-0.7)


 


Basophils # (Auto)


  


  


  0.0 x10^3/uL


(0.0-0.2)


 


Sodium Level


  


  


  145 mmol/L


(136-145)


 


Potassium Level


  


  


  4.8 mmol/L


(3.5-5.1)


 


Chloride Level


  


  


  110 mmol/L


()


 


Carbon Dioxide Level


  


  


  24 mmol/L


(21-32)


 


Anion Gap   11 (6-14) 


 


Blood Urea Nitrogen


  


  


  40 mg/dL


(7-20)


 


Creatinine


  


  


  6.2 mg/dL


(0.6-1.0)


 


Estimated GFR


(Cockcroft-Gault) 


  


  8.5 


 


 


Glucose Level


  


  


  109 mg/dL


(70-99)


 


Calcium Level


  


  


  9.1 mg/dL


(8.5-10.1)


 


Triglycerides Level


  


  


  83 mg/dL


(0-150)


 


Cholesterol Level


  


  


  114 mg/dL


(0-200)


 


LDL Cholesterol, Calculated


  


  


  58 mg/dL


(0-100)


 


VLDL Cholesterol, Calculated


  


  


  17 mg/dL


(0-40)


 


Non-HDL Cholesterol Calculated


  


  


  75 mg/dL


(0-129)


 


HDL Cholesterol


  


  


  39 mg/dL


(40-60)


 


Cholesterol/HDL Ratio   2.9 











Physical Exam


Chest:  Symmetric


Heart:  S1S2, RRR (SR, no acute changes)


Abdomen:  Soft N/T


Extremities:  No Edema, No Calf Tenderness


Neurology:  alert, oriented, follow commands





Assessment


Assessment


1.  Hypertensive encephalopathy: Better. CT head no acute changes. 


2.  Acute on chronic diastolic CHF: improving. peaked troponin at 0.06, demand 

mediated. 


3.  Malignant HTN; improved. strongly suspect due to skipping of meds. 


4.  ESRD


5.  Treatment noncompliance.  


6.  Anemia of chronic disease


7.  UTI?








Recommendations





1.  Awaiting TTE, further recommendations after result


2.  HD today for fluid off loading per nephrology. 


3.  Continue antiHTN regimen. Titrate up meds per BP response. 


4.  Reinforced medication compliance











CONCETTA FINN APRN May 19, 2017 11:58

## 2017-05-19 NOTE — CARD
--------------- APPROVED REPORT --------------





EXAM: Two-dimensional and M-mode echocardiogram with Doppler and color Doppler.



Other Information 

Quality : Good



INDICATION

Pericardial Effusion 

Congestive Heart Failure 



2D DIMENSIONS 

RVDd3.2 (2.9-3.5cm)Left Atrium(2D)3.5 (1.6-4.0cm)

IVSd1.6 (0.7-1.1cm)Aortic Root(2D)2.8 (2.0-3.7cm)

LVDd5.0 (3.9-5.9cm)LVOT Diameter2.0 (1.8-2.4cm)

PWd1.2 (0.7-1.1cm)LVDs2.6 (2.5-4.0cm)

FS (%) 30.0 %SV91.1 ml

LVEF(%)60.0 (>50%)



Aortic Valve

AoV Peak David.216.3cm/sAoV VTI35.2cm

AO Peak GR.18.7mmHgLVOT Peak David.153.1cm/s

LVOT  VTI 26.65cmAO Mean GR.9mmHg

MARIELA (VMAX)2.27dw5ZWJ   (VTI)2.43cm2



Mitral Valve

MV E Dfmhvemd678.5cm/sMV DECEL QXBC152vb

MV A Mbjhhypv199.1cm/sMV LFW27au

E/A  Ratio1.3MVA (PHT)3.66cm2



TDI

E/Lateral E'27.0E/Medial E'22.1



Tricuspid Valve

TR P. Nzdtfkel444vt/sRAP MXKNKPII92ueVe

TR Peak Gr.26boQxKSWV882ukYu



Pulmonary Vein

S1 Akvvthuk30.4cm/sD2 Agfrjnhr78.4cm/s



 LEFT VENTRICLE 

The left ventricle is normal size. There is mild to moderate concentric left ventricular hypertrophy.
 The left ventricular systolic function is normal and the ejection fraction is within normal range. T
he Ejection Fraction is 60-65%. There is grossly normal LV segmental wall motion.



 RIGHT VENTRICLE 

The right ventricle is mildly dilated. The right ventricular systolic function is normal.



 ATRIA 

The left atrium size is normal. The right atrium is moderately dilated. The interatrial septum is int
act with no evidence for an atrial septal defect or patent foramen ovale as noted on 2-D or Doppler i
maging.



 AORTIC VALVE 

The aortic valve is calcified but opens well. Doppler and Color Flow revealed trace to mild aortic re
gurgitation. There is no significant aortic valvular stenosis.



 MITRAL VALVE 

The mitral valve is normal in structure and function. There is no evidence of mitral valve prolapse. 
There is no mitral valve stenosis. Doppler and Color-flow revealed trace mitral regurgitation.



 TRICUSPID VALVE 

The tricuspid valve is normal in structure and function. Doppler and Color Flow revealed severe tricu
spid regurgitation. There is severe pulmonary hypertension. The PA pressure was estimated at 100 mmHg
. There is no tricuspid valve stenosis.



 PULMONIC VALVE 

Doppler and Color Flow revealed mild pulmonic valvular regurgitation. There is no pulmonic valvular s
tenosis.



 GREAT VESSELS 

The aortic root is normal in size. The IVC is dilated and collapses <50% with inspiration.



 PERICARDIAL EFFUSION 

There is no pleural effusion. There is a small circumferential pericardial effusion without hemodynam
ic effects



Critical Notification

Critical Value: No



<Conclusion>

The left ventricular systolic function is normal and the ejection fraction is within normal range. Th
e Ejection Fraction is 60-65%.

There is grossly normal LV segmental wall motion.

The right atrium is moderately dilated.

Doppler and Color Flow revealed  severe tricuspid regurgitation. There is severe pulmonary hypertensi
on. The PA pressure was estimated at 100 mmHg.

The IVC is dilated and collapses <50% with inspiration.

There is a small circumferential pericardial effusion without hemodynamic effects

## 2017-05-19 NOTE — HP
ADMIT DATE:  05/19/2017



PATIENT LOCATION:  Counts include 234 beds at the [facility].



ATTENDING PHYSICIAN:  Dr. León.



PRIMARY CARE PHYSICIAN:  Dr. Boyer.



REASON FOR ADMISSION TO THE HOSPITAL:  Shortness of breath.  The patient is on

dialysis, accelerated malignant hypertension.



HISTORY OF PRESENT ILLNESS:  The patient is a 51-year-old female, the patient of

Dr. Boyer.  She has a history of hypertension and she is on renal failure, on

hemodialysis for at least 3 years and she goes to dialysis on Monday, Wednesday,

Friday and she was having increased shortness of breath for last week, it

progressively worse and she had dialysis on Wednesday and then Thursday.  She

was having shortness of breath and she came to the hospital.  The patient has

high blood pressure, accelerated hypertension and malignant hypertension.  The

patient was put on ____ drip and was admitted to the ____.



PAST MEDICAL HISTORY:  History of hypertension, pulmonary hypertension,

hyperlipidemia, GERD, anemia, depression, rheumatoid arthritis and hemodialysis.



PAST SURGICAL HISTORY:  She had a hysterectomy, tubal ligation and AV fistula,

right arm.



FAMILY HISTORY:  Positive for hypertension, cousin is on dialysis.



SOCIAL HISTORY:  No history of smoking, alcohol or drug abuse.



ALLERGIES:  NO KNOWN DRUG ALLERGIES.



MEDICATIONS AT HOME:

 



______________________________

KAROLINA LEÓN MD



DR:  ANDREW/argentina  JOB#:  [id number] / [id number]

DD:  05/19/2017 10:11  DT:  05/19/2017 12:25

## 2017-05-20 VITALS — DIASTOLIC BLOOD PRESSURE: 93 MMHG | SYSTOLIC BLOOD PRESSURE: 214 MMHG

## 2017-05-20 VITALS — DIASTOLIC BLOOD PRESSURE: 79 MMHG | SYSTOLIC BLOOD PRESSURE: 147 MMHG

## 2017-05-20 VITALS — DIASTOLIC BLOOD PRESSURE: 58 MMHG | SYSTOLIC BLOOD PRESSURE: 128 MMHG

## 2017-05-20 VITALS — DIASTOLIC BLOOD PRESSURE: 76 MMHG | SYSTOLIC BLOOD PRESSURE: 141 MMHG

## 2017-05-20 VITALS — DIASTOLIC BLOOD PRESSURE: 73 MMHG | SYSTOLIC BLOOD PRESSURE: 151 MMHG

## 2017-05-20 VITALS — DIASTOLIC BLOOD PRESSURE: 63 MMHG | SYSTOLIC BLOOD PRESSURE: 126 MMHG

## 2017-05-20 RX ADMIN — LISINOPRIL SCH MG: 20 TABLET ORAL at 20:36

## 2017-05-20 RX ADMIN — GABAPENTIN SCH MG: 100 CAPSULE ORAL at 20:36

## 2017-05-20 RX ADMIN — FUROSEMIDE SCH MG: 40 TABLET ORAL at 10:28

## 2017-05-20 RX ADMIN — CYCLOBENZAPRINE HYDROCHLORIDE SCH MG: 10 TABLET, FILM COATED ORAL at 20:36

## 2017-05-20 RX ADMIN — LISINOPRIL SCH MG: 20 TABLET ORAL at 08:09

## 2017-05-20 RX ADMIN — ATORVASTATIN CALCIUM SCH MG: 20 TABLET, FILM COATED ORAL at 20:36

## 2017-05-20 RX ADMIN — CYCLOBENZAPRINE HYDROCHLORIDE SCH MG: 10 TABLET, FILM COATED ORAL at 08:11

## 2017-05-20 RX ADMIN — POLYETHYLENE GLYCOL 3350 PRN GM: 17 POWDER, FOR SOLUTION ORAL at 17:06

## 2017-05-20 RX ADMIN — CALCIUM ACETATE SCH MG: 667 CAPSULE ORAL at 16:59

## 2017-05-20 RX ADMIN — CALCIUM ACETATE SCH MG: 667 CAPSULE ORAL at 11:57

## 2017-05-20 RX ADMIN — LABETALOL HCL SCH MG: 100 TABLET, FILM COATED ORAL at 20:36

## 2017-05-20 RX ADMIN — Medication SCH TAB: at 08:11

## 2017-05-20 RX ADMIN — CITALOPRAM HYDROBROMIDE SCH MG: 20 TABLET ORAL at 08:10

## 2017-05-20 RX ADMIN — LABETALOL HCL SCH MG: 100 TABLET, FILM COATED ORAL at 08:10

## 2017-05-20 RX ADMIN — PANTOPRAZOLE SODIUM SCH MG: 40 TABLET, DELAYED RELEASE ORAL at 08:10

## 2017-05-20 RX ADMIN — ISOSORBIDE MONONITRATE SCH MG: 30 TABLET, EXTENDED RELEASE ORAL at 08:09

## 2017-05-20 RX ADMIN — GABAPENTIN SCH MG: 100 CAPSULE ORAL at 08:08

## 2017-05-20 RX ADMIN — CALCIUM ACETATE SCH MG: 667 CAPSULE ORAL at 08:09

## 2017-05-20 RX ADMIN — ASPIRIN SCH MG: 81 TABLET, COATED ORAL at 08:11

## 2017-05-20 NOTE — CONS
DATE OF CONSULTATION:  



PRIMARY PHYSICIAN:  Dr. Antonio in the ER as well as Dr. Lóen.



HISTORY OF PRESENT ILLNESS:  The patient is a 51-year-old -American

female followed by Dr. Lafleur in our practice.  She is known to have underlying

renal insufficiency and is on dialysis on Monday, Wednesday, and Friday basis. 

She presented to the ER yesterday with gradual worsening of her shortness of

breath and chest discomfort.  She claims she was having chest pain when she was

trying to walk long distances and had to stop for shortness of breath.  She does

not have that problem today.  She was given IV Lasix in the ER.  She was noted

to be at her dry weight.  She had a chest x-ray which showed moderate congestive

changes.  She was maintaining on oxygenation pretty well.  In this setting, she

was also noted to have blood pressures of 224/98.



It should be noted that patient has been restrictive in how much fluid to be

taken off on dialysis in the past.  She does admit to some amount of shortness

of breath and we will proceed with hemodialysis.



For rest of details, see electronic records.

 



______________________________

CESARIO CHRISTOPHER MD



DR:  PAULINO/argentina  JOB#:  [id number] / [id number]

DD:  05/19/2017 13:43  DT:  05/20/2017 02:09

## 2017-05-20 NOTE — PDOC
Renal-Progress Notes


Subjective Notes


Notes


FEELING BETTER





History of Present Illness


Hx of present illness


STABLE





Vitals


Vitals





Vital Signs








  Date Time  Temp Pulse Resp B/P (MAP) Pulse Ox O2 Delivery O2 Flow Rate FiO2


 


5/20/17 09:28  70  128/58 (81)    


 


5/20/17 08:31      Room Air  


 


5/20/17 07:20 97.7  18  99   





 97.7       


 


5/19/17 19:40       2.0 








Weight


Weight [ ]





I.O.


Intake and Output











Intake and Output 


 


 5/20/17





 06:59


 


Intake Total 1020 ml


 


Output Total 225 ml


 


Balance 795 ml


 


 


 


Intake Oral 1020 ml


 


Output Urine Total 225 ml


 


# Voids 2











Micro


Micro





Microbiology


5/18/17 Urine Culture - Preliminary, Resulted


          


5/18/17 Urine Culture Result 1 (LIZZETTE) - Preliminary, Resulted





Review of Systems


Constitutional:  yes: weakness, alert, oriented


Ears/Nose/Throat:  Yes: no symptom reported


Eyes:  Yes: no symptom reported


Pulmonary:  Yes dyspnea


Cardiovascular:  Yes no symptom reported


Gastrointestional:  Yes: constipation


Musculoskeletal:  Yes: muscle stiffness


Skin:  Yes no symptom reported





Physical Exam


General Appearance:  no apparent distress


Skin:  warm


Respiratory:  bilateral CTA


Heart:  S1S2


Abdomen:  soft, bowel sounds present


Genitourinary:  bladder flat


Neurology:  alert, oriented





Assessment


Assessment


IMP





ESRD


ANEMIA


DYSPNEA-? PULM HTN





PLAN





EXTRA HD TODAY


UF TO DW AS TOLERATED AND CHALLENGE


CTA TODAY


D/W PULMONARY











KYM BRAND MD May 20, 2017 10:42

## 2017-05-20 NOTE — PDOC
PULMONARY PROGRESS NOTES


Subjective


feels better


no further CP


Vitals





Vital Signs








  Date Time  Temp Pulse Resp B/P (MAP) Pulse Ox O2 Delivery O2 Flow Rate FiO2


 


5/20/17 09:28  70  128/58 (81)    


 


5/20/17 08:31      Room Air  


 


5/20/17 07:20 97.7  18  99   





 97.7       


 


5/19/17 19:40       2.0 








General:  Alert, No acute distress


Lungs:  Clear


Cardiovascular:  S1


Abdomen:  Soft, Non-tender


Neuro Exam:  Alert


Extremities:  No Edema


Skin:  Warm


Labs





Laboratory Tests








Test


  5/18/17


10:43 5/18/17


11:25 5/18/17


13:55 5/18/17


19:05


 


White Blood Count


  4.6 x10^3/uL


(4.0-11.0) 


  


  


 


 


Red Blood Count


  2.61 x10^6/uL


(3.50-5.40) 


  


  


 


 


Hemoglobin


  8.3 g/dL


(12.0-15.5) 


  


  


 


 


Hematocrit


  26.4 %


(36.0-47.0) 


  


  


 


 


Mean Corpuscular Volume


  101 fL


() 


  


  


 


 


Mean Corpuscular Hemoglobin 32 pg (25-35)    


 


Mean Corpuscular Hemoglobin


Concent 31 g/dL


(31-37) 


  


  


 


 


Red Cell Distribution Width


  16.9 %


(11.5-14.5) 


  


  


 


 


Platelet Count


  300 x10^3/uL


(140-400) 


  


  


 


 


Neutrophils (%) (Auto) 59 % (31-73)    


 


Lymphocytes (%) (Auto) 21 % (24-48)    


 


Monocytes (%) (Auto) 14 % (0-9)    


 


Eosinophils (%) (Auto) 5 % (0-3)    


 


Basophils (%) (Auto) 1 % (0-3)    


 


Neutrophils # (Auto)


  2.7 x10^3uL


(1.8-7.7) 


  


  


 


 


Lymphocytes # (Auto)


  1.0 x10^3/uL


(1.0-4.8) 


  


  


 


 


Monocytes # (Auto)


  0.6 x10^3/uL


(0.0-1.1) 


  


  


 


 


Eosinophils # (Auto)


  0.2 x10^3/uL


(0.0-0.7) 


  


  


 


 


Basophils # (Auto)


  0.0 x10^3/uL


(0.0-0.2) 


  


  


 


 


Prothrombin Time


  13.7 SEC


(11.7-14.0) 


  


  


 


 


Prothromb Time International


Ratio 1.1 (0.8-1.1) 


  


  


  


 


 


Sodium Level


  


  147 mmol/L


(136-145) 


  


 


 


Potassium Level


  


  4.3 mmol/L


(3.5-5.1) 


  


 


 


Chloride Level


  


  111 mmol/L


() 


  


 


 


Carbon Dioxide Level


  


  26 mmol/L


(21-32) 


  


 


 


Anion Gap  10 (6-14)   


 


Blood Urea Nitrogen


  


  31 mg/dL


(7-20) 


  


 


 


Creatinine


  


  5.4 mg/dL


(0.6-1.0) 


  


 


 


Estimated GFR


(Cockcroft-Gault) 


  10.0 


  


  


 


 


Glucose Level


  


  105 mg/dL


(70-99) 


  


 


 


Calcium Level


  


  9.4 mg/dL


(8.5-10.1) 


  


 


 


Magnesium Level


  


  2.0 mg/dL


(1.8-2.4) 


  


 


 


Total Bilirubin


  


  0.6 mg/dL


(0.2-1.0) 


  


 


 


Direct Bilirubin


  


  0.2 mg/dL


(0.0-0.2) 


  


 


 


Aspartate Amino Transf


(AST/SGOT) 


  99 U/L (15-37) 


  


  


 


 


Alanine Aminotransferase


(ALT/SGPT) 


  98 U/L (14-59) 


  


  


 


 


Alkaline Phosphatase


  


  91 U/L


() 


  


 


 


Creatine Kinase


  


  61 U/L


() 


  


 


 


Creatine Kinase MB (Mass)


  


  1.2 ng/mL


(0.0-3.6) 


  


 


 


Creatine Kinase MB Relative


Index 


   % (0-4) 


  


  


 


 


Troponin I Quantitative


  


  0.034 ng/mL


(0.000-0.055) 


  0.063 ng/mL


(0.000-0.055)


 


NT-Pro-B-Type Natriuretic


Peptide 


  > 76155 pg/mL


(0-124) 


  


 


 


Total Protein


  


  6.9 g/dL


(6.4-8.2) 


  


 


 


Albumin


  


  2.9 g/dL


(3.4-5.0) 


  


 


 


Lipase


  


  121 U/L


() 


  


 


 


Thyroid Stimulating Hormone


(TSH) 


  1.937 uIU/mL


(0.358-3.74) 


  


 


 


Urine Collection Type   Unknown  


 


Urine Color   Yellow  


 


Urine Clarity   Clear  


 


Urine pH   7.5  


 


Urine Specific Gravity   1.010  


 


Urine Protein


  


  


  100 mg/dL


(NEG-TRACE) 


 


 


Urine Glucose (UA)


  


  


  Negative mg/dL


(NEG) 


 


 


Urine Ketones (Stick)


  


  


  Negative mg/dL


(NEG) 


 


 


Urine Blood   Small (NEG)  


 


Urine Nitrite   Negative (NEG)  


 


Urine Bilirubin   Negative (NEG)  


 


Urine Urobilinogen Dipstick


  


  


  0.2 mg/dL (0.2


mg/dL) 


 


 


Urine Leukocyte Esterase   Moderate (NEG)  


 


Urine RBC   1-2 /HPF (0-2)  


 


Urine WBC


  


  


  20-40 /HPF


(0-4) 


 


 


Urine Squamous Epithelial


Cells 


  


  Many /LPF 


  


 


 


Urine Bacteria


  


  


  Moderate /HPF


(0-FEW) 


 


 


Urine Opiates Screen   Neg (NEG)  


 


Urine Methadone Screen   Neg (NEG)  


 


Urine Barbiturates   Neg (NEG)  


 


Urine Phencyclidine Screen   Neg (NEG)  


 


Urine


Amphetamine/Methamphetamine 


  


  Neg (NEG) 


  


 


 


Urine Benzodiazepines Screen   Neg (NEG)  


 


Urine Cocaine Screen   Neg (NEG)  


 


Urine Cannabinoids Screen   Neg (NEG)  


 


Urine Ethyl Alcohol   Neg (NEG)  


 


Test


  5/19/17


01:10 


  


  


 


 


White Blood Count


  5.1 x10^3/uL


(4.0-11.0) 


  


  


 


 


Red Blood Count


  2.57 x10^6/uL


(3.50-5.40) 


  


  


 


 


Hemoglobin


  8.1 g/dL


(12.0-15.5) 


  


  


 


 


Hematocrit


  25.9 %


(36.0-47.0) 


  


  


 


 


Mean Corpuscular Volume


  101 fL


() 


  


  


 


 


Mean Corpuscular Hemoglobin 32 pg (25-35)    


 


Mean Corpuscular Hemoglobin


Concent 31 g/dL


(31-37) 


  


  


 


 


Red Cell Distribution Width


  17.1 %


(11.5-14.5) 


  


  


 


 


Platelet Count


  280 x10^3/uL


(140-400) 


  


  


 


 


Neutrophils (%) (Auto) 70 % (31-73)    


 


Lymphocytes (%) (Auto) 15 % (24-48)    


 


Monocytes (%) (Auto) 13 % (0-9)    


 


Eosinophils (%) (Auto) 1 % (0-3)    


 


Basophils (%) (Auto) 0 % (0-3)    


 


Neutrophils # (Auto)


  3.6 x10^3uL


(1.8-7.7) 


  


  


 


 


Lymphocytes # (Auto)


  0.7 x10^3/uL


(1.0-4.8) 


  


  


 


 


Monocytes # (Auto)


  0.7 x10^3/uL


(0.0-1.1) 


  


  


 


 


Eosinophils # (Auto)


  0.1 x10^3/uL


(0.0-0.7) 


  


  


 


 


Basophils # (Auto)


  0.0 x10^3/uL


(0.0-0.2) 


  


  


 


 


Sodium Level


  145 mmol/L


(136-145) 


  


  


 


 


Potassium Level


  4.8 mmol/L


(3.5-5.1) 


  


  


 


 


Chloride Level


  110 mmol/L


() 


  


  


 


 


Carbon Dioxide Level


  24 mmol/L


(21-32) 


  


  


 


 


Anion Gap 11 (6-14)    


 


Blood Urea Nitrogen


  40 mg/dL


(7-20) 


  


  


 


 


Creatinine


  6.2 mg/dL


(0.6-1.0) 


  


  


 


 


Estimated GFR


(Cockcroft-Gault) 8.5 


  


  


  


 


 


Glucose Level


  109 mg/dL


(70-99) 


  


  


 


 


Calcium Level


  9.1 mg/dL


(8.5-10.1) 


  


  


 


 


Troponin I Quantitative


  0.046 ng/mL


(0.000-0.055) 


  


  


 


 


Triglycerides Level


  83 mg/dL


(0-150) 


  


  


 


 


Cholesterol Level


  114 mg/dL


(0-200) 


  


  


 


 


LDL Cholesterol, Calculated


  58 mg/dL


(0-100) 


  


  


 


 


VLDL Cholesterol, Calculated


  17 mg/dL


(0-40) 


  


  


 


 


Non-HDL Cholesterol Calculated


  75 mg/dL


(0-129) 


  


  


 


 


HDL Cholesterol


  39 mg/dL


(40-60) 


  


  


 


 


Cholesterol/HDL Ratio 2.9    








Medications





Active Scripts








 Medications  Dose


 Route/Sig


 Max Daily Dose Days Date Category


 


 Lorazepam 0.5 Mg


 Tablet  0.5 Mg


 PO PRN DAILY


    5/18/17 Reported


 


 Furosemide 40 Mg


 Tablet  40 Mg


 PO QODAY


    5/18/17 Reported


 


 Hydralazine Hcl


 50 Mg Tablet  50 Mg


 PO TID


    5/18/17 Reported


 


 Labetalol Hcl 300


 Mg Tablet  300 Mg


 PO BID


    5/18/17 Reported


 


 Lisinopril 20 Mg


 Tablet  20 Mg


 PO QHS


    5/18/17 Reported


 


 Lisinopril 20 Mg


 Tablet  40 Mg


 PO DAILY


    10/11/16 Reported


 


 Cyclobenzaprine


 Hcl 5 Mg Tablet  1 Tab


 PO BID


    2/9/16 Reported


 


 Hydrocodone-Apap


 5-325  **


  (Hydrocodone


 Bit/Acetaminophen)


 1 Each Tablet  1 Tab


 PO PRN Q6HRS PRN


    2/9/16 Reported


 


 Zofran Odt


  (Ondansetron) 8


 Mg Tab.rapdis  8 Mg


 PO BID PRN


    2/9/16 Reported


 


 Senna


  (Sennosides) 8.6


 Mg Capsule  8.6 Mg


 PO PRN PRN


    2/5/16 Reported


 


 Nephro-Svetlana


 Tablet (Folic


 Acid/Vitamin B


 Comp W-C) 0.8 Mg


 Tablet  1 Tab


 PO DAILY


    2/5/16 Reported


 


 Imdur (Isosorbide


 Mononitrate) 60


 Mg Tab.er.24h  60 Mg


 PO DAILY


    5/9/14 Reported


 


 Clonidine Hcl 0.3


 Mg Tablet  0.3 Mg


 PO TID


    5/9/14 Reported


 


 Norvasc


  (Amlodipine


 Besylate) 10 Mg


 Tablet  10 Mg


 PO DAILY


    5/9/14 Reported


 


 Protonix


  (Pantoprazole


 Sodium) 40 Mg


   40 Mg


 PO DAILY


    5/3/14 Reported


 


 Neurontin


  (Gabapentin) 100


 Mg Capsule  100 Mg


 PO BID


    5/3/14 Reported


 


 Calcium Acetate


 667 Mg Capsule  667 Mg


 PO TIDWMEALS


    3/28/14 Reported


 


 Citalopram Hbr


  (Citalopram


 Hydrobromide) 20


 Mg Tablet  20 Mg


 PO DAILY


    12/27/13 Reported


 


 Children's


 Aspirin (Aspirin)


 81 Mg Tab.chew  81 Mg


 PO DAILY


    12/27/13 Reported


 


 Lipitor


  (Atorvastatin


 Calcium) 20 Mg


 Tablet  20 Mg


 PO HS


    12/27/13 Reported











Impression


.


1.  Progressive dyspnea in a patient with end-stage renal disease and has


significantly enlarged cardiac silhouette and also mildly prominent interstitial


markings.  She has a history of moderate pericardial effusion in February 2016. 


Following is the diff. diagnosis


a.   No further progression of pericardial effusion/ No temponade


2.   ? mild Interstitial edema contributing to the patient's symptoms.


3.   No significant history of tobacco use.


4.   Severe pulmonary HTN on repeat echo. ? chronic PE/ ? primary pulmonary HTN.


5.   ESRD/ HD





Plan


.





1.  d/w Dr Jackson. ok to give contrast for CTA chest to r/o PE


2.  Follow cardiology recommendation.


3.  Hemodialysis with ultrafiltration 


4.  Follow renal recommendations.


5.  Continue anti-ischemic medications per Cardiology.


6.  Discussed with RN 


7.  If no PE, will then need right/ left heart cath


    addend: CTA with no PE


                 Would ask cardiology to do RHC Monday











HUSEYIN YUAN MD May 20, 2017 10:28

## 2017-05-20 NOTE — PDOC
CARDIO Progress Notes


Date and Time


Date of Service


5/20/2017


Time of Evaluation


0915





Subjective


Subjective:  No Chest Pain, No Palpitations, No Dizziness, Other (Feels tired 

and still miserable. Also a little SOA)





Vitals


Vitals





Vital Signs








  Date Time  Temp Pulse Resp B/P (MAP) Pulse Ox O2 Delivery O2 Flow Rate FiO2


 


5/20/17 08:31      Room Air  


 


5/20/17 08:18  69  187/89    


 


5/20/17 07:20 97.7  18  99   





 97.7       


 


5/19/17 19:40       2.0 








Weight


Weight [ ]





Input and Output


Intake and Output











Intake and Output 


 


 5/20/17





 07:00


 


Intake Total 1020 ml


 


Output Total 225 ml


 


Balance 795 ml


 


 


 


Intake Oral 1020 ml


 


Output Urine Total 225 ml


 


# Voids 2











Microbiology


Micro





Microbiology


5/18/17 Urine Culture - Preliminary, Resulted


          


5/18/17 Urine Culture Result 1 (LIZZETTE) - Preliminary, Resulted





Physical Exam


HEENT:  Neck Supple W Full Motion


Chest:  Symmetric


LUNGS:  Clear to Auscultation


Heart:  S1S2, RRR (SR, no acute changes), murmurs (5/6 systolic murmur to LLS 

border)


Abdomen:  Soft N/T


Extremities:  No Edema, No Calf Tenderness


Neurology:  alert, oriented, follow commands





Assessment


Assessment


1.  Hypertensive encephalopathy: resolved


2.  Acute on chronic diastolic CHF: peaked troponin at 0.06, demand mediated. 

Compensated


3.  Malignant HTN: remains labile but better after meds were given this am


4.  ESRD: HD per nephrology


5.  Treatment noncompliance.  


6.  Anemia of chronic disease


7.  Mild AI


8.  Severe Pulmonary HTN with severe TR and RA dilation:  mmHg





Recommendations





1.  EF and wall motion normal. Pulmonary consult pending


2.  Continue current regimen. Titrate up meds per BP response. 


3.  Reinforced medication compliance 


4.  Supportive care. None further at this time unless RHC is warranted, await 

pulmonary eval.











CONCETTA FINN May 20, 2017 09:34

## 2017-05-20 NOTE — PDOC
PROGRESS NOTES


Subjective


Subjective


getting dialysis now





Objective


Objective





Vital Signs








  Date Time  Temp Pulse Resp B/P (MAP) Pulse Ox O2 Delivery O2 Flow Rate FiO2


 


5/20/17 11:35 98.2 68 16 151/73 (99) 97 Room Air  





 98.2       


 


5/19/17 19:40       2.0 














Intake and Output 


 


 5/20/17





 07:00


 


Intake Total 1020 ml


 


Output Total 225 ml


 


Balance 795 ml


 


 


 


Intake Oral 1020 ml


 


Output Urine Total 225 ml


 


# Voids 2











Physical Exam


Abdomen:  Soft, No tenderness, Other (truncal obesity)


Heart:  Regular rate, Normal S1, Normal S2, Other (3-4/6 systolic murmur to LLS 

border; S4)


Extremities:  No cyanosis, Other (LA AV dialysis fistula positive thrill and 

bruit.  Bounding preipheral pulses; 2+ bilateral LE pitting edema)


General:  Oriented X3, Cooperative, mild distress


Lungs:  Other (basilar crackles)


MUSCULOSKELETAL:  Osteoarthritic changes both hands


Neuro:  Normal speech, Sensation intact


Psych/Mental Status:  Other (drowsy)


Skin:  No breakdown, No significant lesion





Diagnosis


Problem List


Problems


Medical Problems:


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


Status: Acute  











Assessment


Assessment


Problems


Medical Problems:


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


Status: Acute  





ASSESSMENT/PLAN:


1.  Hypertensive encephalopathy


2.  Acute on chronic diastolic CHF


3.  Malignant HTN; on Cardene


4.  ESRD; dialysis m,w,f


5.  HA, possible stroke symptoms 


6.  Possible non-compliance; 


7.  Anemia of chronic disease











PLAN:


CTA-ve for PE.


dialysis today.


ECHO  noted





1.  Check echo to assess LV function -55% EJF, mild pericardial effusion


2.  CT head -ve. Consult neuro appreciated


3.  Resume home antiHTN therapy. Titrate Cardene gtt off as able.


4.  Received Lasix 40mg IV in ED. Further fluid offloading in HD per nephrology


5.  Reinforced medication compliance


Problems:  





Plan


Plan of Care


Problems


Medical Problems:


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


Status: Acute  








Comment


Review of Relevant


I have reviewed the following items ant (where applicable) has been applied.


Labs





Microbiology


5/18/17 Urine Culture - Preliminary, Resulted


          


5/18/17 Urine Culture Result 1 (LIZZETTE) - Preliminary, Resulted


Medications





Current Medications


Furosemide (Lasix) 40 mg QTUTHSASU@0900 PO ;  Start 5/20/17 at 09:00


Info (PHARMACY MONITORING -- do not chart) 1 each PRN DAILY  PRN MC SEE COMMENTS

;  Start 5/19/17 at 16:00


Sodium Chloride 1,000 ml @  400 mls/hr Q2H30M PRN IV PATENCY;  Start 5/19/17 at 

14:00;  Stop 5/20/17 at 01:59;  Status DC


Sodium Chloride 1,000 ml @  1,000 mls/hr Q1H PRN IV hypotension;  Start 5/19/17 

at 14:00;  Stop 5/19/17 at 19:59;  Status DC


Vitals/I & O





Vital Sign - Last 24 Hours








 5/19/17 5/19/17 5/19/17 5/19/17





 19:29 19:40 20:56 20:57


 


Temp 98.3   





 98.3   


 


Pulse 73  73 73


 


Resp 20   


 


B/P (MAP) 133/65 (87)  133/65 133/65


 


Pulse Ox 93   


 


O2 Delivery Room Air Nasal Cannula  


 


O2 Flow Rate  2.0  


 


    





    





 5/19/17 5/19/17 5/19/17 5/20/17





 20:57 20:57 23:30 03:05


 


Temp   98.4 98.1





   98.4 98.1


 


Pulse 73 73 74 68


 


Resp   18 18


 


B/P (MAP) 133/65 133/65 175/82 (113) 141/76 (97)


 


Pulse Ox   93 91


 


O2 Delivery   Room Air Room Air


 


    





    





 5/20/17 5/20/17 5/20/17 5/20/17





 07:20 08:09 08:09 08:10


 


Temp 97.7   





 97.7   


 


Pulse 67 69 68 69


 


Resp 18   


 


B/P (MAP) 214/93 (133) 187/89 187/89 187/89


 


Pulse Ox 99   


 


O2 Delivery Room Air   


 


    





    





 5/20/17 5/20/17 5/20/17 5/20/17





 08:10 08:11 08:18 08:31


 


Pulse 69 69 69 


 


B/P (MAP) 187/89 187/89 187/89 


 


O2 Delivery    Room Air





 5/20/17 5/20/17  





 09:28 11:35  


 


Temp  98.2  





  98.2  


 


Pulse 70 68  


 


Resp  16  


 


B/P (MAP) 128/58 (81) 151/73 (99)  


 


Pulse Ox  97  


 


O2 Delivery  Room Air  














Intake and Output   


 


 5/19/17 5/19/17 5/20/17





 15:00 23:00 07:00


 


Intake Total  920 ml 100 ml


 


Output Total  225 ml 


 


Balance  695 ml 100 ml

















KAROLINA ENRIQUE MD May 20, 2017 13:35

## 2017-05-20 NOTE — RAD
Indication: Short of breath, pulmonary hypertension.



Technique: Axial images and coronal and sagittal maximum intensity projection

reformatted images are provided. 75 mL of intravenous Omnipaque 300 was

administered without complication. Patient has hemodialysis scheduled for

later today. Comparison is from May 8, 2014.



One or more of the following individualized dose reduction techniques were

utilized for this examination:

1. Automated exposure control

2. Adjustment of the mA and/or kV according to patient size

3. Use of iterative reconstruction technique



Findings:

The contrast bolus is satisfactory. There is no filling defect to suggest

pulmonary embolism. Pulmonary outflow tract measures 3 cm in diameter. The

heart is enlarged. Moderate pericardial effusion is noted. Calcified left

hilar lymph nodes are present. Central airways are patent. There is a small

right and minimal left pleural effusion. There is streaky atelectasis or less

likely infiltrate in the lower lobes. There is no worrisome pulmonary nodule.

There is fatty infiltration of the liver. There are minimal degenerative

changes in the spine.



Impression:

1. Negative for pulmonary embolism.

2. Cardiomegaly. Moderate pericardial effusion.

3. Small right pleural effusion.

4. Streaky opacities in the lung bases, atelectasis favored.

## 2017-05-21 VITALS — SYSTOLIC BLOOD PRESSURE: 123 MMHG | DIASTOLIC BLOOD PRESSURE: 63 MMHG

## 2017-05-21 VITALS — SYSTOLIC BLOOD PRESSURE: 144 MMHG | DIASTOLIC BLOOD PRESSURE: 85 MMHG

## 2017-05-21 VITALS — SYSTOLIC BLOOD PRESSURE: 167 MMHG | DIASTOLIC BLOOD PRESSURE: 90 MMHG

## 2017-05-21 VITALS — DIASTOLIC BLOOD PRESSURE: 65 MMHG | SYSTOLIC BLOOD PRESSURE: 138 MMHG

## 2017-05-21 VITALS — SYSTOLIC BLOOD PRESSURE: 140 MMHG | DIASTOLIC BLOOD PRESSURE: 80 MMHG

## 2017-05-21 VITALS — SYSTOLIC BLOOD PRESSURE: 124 MMHG | DIASTOLIC BLOOD PRESSURE: 69 MMHG

## 2017-05-21 RX ADMIN — GABAPENTIN SCH MG: 100 CAPSULE ORAL at 08:18

## 2017-05-21 RX ADMIN — FUROSEMIDE SCH MG: 40 TABLET ORAL at 08:19

## 2017-05-21 RX ADMIN — LISINOPRIL SCH MG: 20 TABLET ORAL at 08:16

## 2017-05-21 RX ADMIN — PANTOPRAZOLE SODIUM SCH MG: 40 TABLET, DELAYED RELEASE ORAL at 08:19

## 2017-05-21 RX ADMIN — CITALOPRAM HYDROBROMIDE SCH MG: 20 TABLET ORAL at 08:14

## 2017-05-21 RX ADMIN — ISOSORBIDE MONONITRATE SCH MG: 30 TABLET, EXTENDED RELEASE ORAL at 08:19

## 2017-05-21 RX ADMIN — LISINOPRIL SCH MG: 20 TABLET ORAL at 21:03

## 2017-05-21 RX ADMIN — Medication SCH TAB: at 08:19

## 2017-05-21 RX ADMIN — LABETALOL HCL SCH MG: 100 TABLET, FILM COATED ORAL at 08:19

## 2017-05-21 RX ADMIN — CALCIUM ACETATE SCH MG: 667 CAPSULE ORAL at 12:09

## 2017-05-21 RX ADMIN — LABETALOL HCL SCH MG: 100 TABLET, FILM COATED ORAL at 21:02

## 2017-05-21 RX ADMIN — ATORVASTATIN CALCIUM SCH MG: 20 TABLET, FILM COATED ORAL at 20:59

## 2017-05-21 RX ADMIN — GABAPENTIN SCH MG: 100 CAPSULE ORAL at 20:59

## 2017-05-21 RX ADMIN — CALCIUM ACETATE SCH MG: 667 CAPSULE ORAL at 18:03

## 2017-05-21 RX ADMIN — CYCLOBENZAPRINE HYDROCHLORIDE SCH MG: 10 TABLET, FILM COATED ORAL at 21:04

## 2017-05-21 RX ADMIN — CYCLOBENZAPRINE HYDROCHLORIDE SCH MG: 10 TABLET, FILM COATED ORAL at 08:19

## 2017-05-21 RX ADMIN — CALCIUM ACETATE SCH MG: 667 CAPSULE ORAL at 08:14

## 2017-05-21 RX ADMIN — ASPIRIN SCH MG: 81 TABLET, COATED ORAL at 08:19

## 2017-05-21 NOTE — PDOC
Renal-Progress Notes


Subjective Notes


Notes


NO NEW COMPLAINTS





History of Present Illness


Hx of present illness


NO CHANGE





Vitals


Vitals





Vital Signs








  Date Time  Temp Pulse Resp B/P (MAP) Pulse Ox O2 Delivery O2 Flow Rate FiO2


 


5/21/17 08:20  71  167/90    


 


5/21/17 08:00      Room Air  


 


5/21/17 06:10 97.6  18  98   





 97.6       








Weight


Weight [ ]





I.O.


Intake and Output











Intake and Output 


 


 5/21/17





 07:00


 


Intake Total 1060 ml


 


Output Total 325 ml


 


Balance 735 ml


 


 


 


Intake Oral 1060 ml


 


Output Urine Total 325 ml


 


# Voids 1











Micro


Micro





Microbiology


5/18/17 Urine Culture - Final, Complete


          


5/18/17 Urine Culture Result 1 (LIZZETTE) - Final, Complete





Review of Systems


Constitutional:  yes: weakness, alert, oriented


Ears/Nose/Throat:  Yes: no symptom reported


Eyes:  Yes: no symptom reported


Pulmonary:  Yes dyspnea


Cardiovascular:  Yes no symptom reported


Gastrointestional:  Yes: constipation


Musculoskeletal:  Yes: muscle stiffness


Skin:  Yes no symptom reported





Physical Exam


General Appearance:  no apparent distress


Skin:  warm


Respiratory:  bilateral CTA


Heart:  S1S2


Abdomen:  soft, bowel sounds present


Genitourinary:  bladder flat


Neurology:  alert, oriented





Assessment


Assessment


IMP





ESRD


ANEMIA


DYSPNEA-? PULM HTN





PLAN





CTA NEG


HD TOMORROW


RIGHT HEART CATH PENDING











KYM BRAND MD May 21, 2017 11:07

## 2017-05-21 NOTE — PDOC
PROGRESS NOTES


Assessment


Assessment


IMPRESSION:


Hypertensive emergency, /112 mmHg.


Hypertensive encephalopathy.


Metabolic encephalopathy.


Chest pain.


Heart murmur SM 3-4/6.


SOB


Renal failure on dialysis.


UTI


HTN


HLD


Anemia





RECOMMENDATIONS/PLAN:


BP control.


Cardiac evaluation.


Treat medical diseases.


Continue dialysis.


OT/PT.





SUBJECTIVE:


She stated she knew she has HTN for over 20 years, but she did not take 

medications due to no symptoms. She said she was feeling better on 5/21.





OBJECTIVE:


No focalized neurological motor or sensory deficits.





PAST MEDICAL AND SURGICAL HISTORY:


Please see H&P





ALLERGY:


Reviewed.





MEDICATIONS:


Refer to MAR





REVIEW OF SYSTEMS:


Constitutional: No malnutrition, weight loss, cachexia.


Head: No traumatic brain or head injury.


Skin: No edema, or rash.


Ear: No infection.


Eyes: No vision loss, or diplopia.


Nose: No bleeding or purulent discharges.


Hearing: No hearing decrease. 


Neck: No injury.


Breast: No history of cancer, masses, or discharges.


Cardiac: HTN, HLD, chest pain


Pulmonary: SOB


GI: No GI Ulcer, GI bleeding.


Urinary/genital: UTI. ESRD on dialysis.


Endocrine: No cousin face, craniofacial dysmorphism, polydactyly.


Skeletomuscular: No muscular atrophy, deformity.


Neurological: see  HP.


Psychiatric: Denies drug use/abuse.


Otherwise, not pertinant14-point review of systems.





PHYSICAL EXAMINATION:   


General appearance in subacute distress.  


HEENT:  Normocephalic and nontraumatic.  Eyes, nose, ears, and throat are 

unremarkable. Hearing decrease.


Neck is supple. No lymphadenopathy. No Crepitus.


Cardiovascular:  S1, S2, regular rate and rhythm. SM 3/6 heard.  


Pulmonary:  Clear to auscultation bilaterally. 


Abdomen:  Bowel sounds are positive.    


Extremities:  No rash, lesions, or edema.  


No restriction of range of motion





NEUROLOGICAL  EXAMINATION:


Awake.


Oriented partially to time, place and person.


PERRL.


EOMI.


CN: no focal findings.


Muscle tone: within normal.


Muscle strength: 5-


DTR: 2


Plantar reflex: Flexor response bilaterally 


Gait: not examined in bed.


Sensory exam: no abnormal findings.


No acute cerebellar signs elicited.


F-T-N test fine.





Objective


Objective





Vital Signs








  Date Time  Temp Pulse Resp B/P (MAP) Pulse Ox O2 Delivery O2 Flow Rate FiO2


 


5/21/17 08:20  71  167/90    


 


5/21/17 08:00      Room Air  


 


5/21/17 06:10 97.6  18  98   





 97.6       














Intake and Output 


 


 5/21/17





 07:00


 


Intake Total 1060 ml


 


Output Total 325 ml


 


Balance 735 ml


 


 


 


Intake Oral 1060 ml


 


Output Urine Total 325 ml


 


# Voids 1











Vitals Signs


Vitals





 VS - Last 72 Hours, by Label








  Date Time  Temp Pulse Resp B/P (MAP) Pulse Ox O2 Delivery O2 Flow Rate FiO2


 


5/21/17 08:20  71  167/90    


 


5/21/17 08:19  71  167/90    


 


5/21/17 08:19  71  167/90    


 


5/21/17 08:18  71  167/90    


 


5/21/17 08:16  71  167/90    


 


5/21/17 08:15  71  167/90    


 


5/21/17 08:00      Room Air  


 


5/21/17 06:10 97.6 69 18 167/90 (115) 98 Room Air  





 97.6       


 


5/21/17 03:33 97.9 73 18 140/80 (100) 96 Room Air  





 97.9       


 


5/20/17 23:21 97.5 69 16 126/63 (84) 97 Room Air  





 97.5       


 


5/20/17 20:37  76  147/79    


 


5/20/17 20:36  76  147/79    


 


5/20/17 20:36  76  147/79    


 


5/20/17 20:35  76  147/79    


 


5/20/17 19:39      Room Air  


 


5/20/17 19:09 98.3 76 16 147/79 (101) 95 Room Air  





 98.3       


 


5/20/17 17:00  80  166/74    


 


5/20/17 17:00  81  166/74    


 


5/20/17 11:35 98.2 68 16 151/73 (99) 97 Room Air  





 98.2       


 


5/20/17 09:28  70  128/58 (81)    


 


5/20/17 08:31      Room Air  


 


5/20/17 08:18  69  187/89    


 


5/20/17 08:11  69  187/89    


 


5/20/17 08:10  69  187/89    


 


5/20/17 08:10  69  187/89    


 


5/20/17 08:09  68  187/89    


 


5/20/17 08:09  69  187/89    


 


5/20/17 07:20 97.7 67 18 214/93 (133) 99 Room Air  





 97.7       











Laboratory


Laboratory





Microbiology


5/18/17 Urine Culture - Final, Complete


          


5/18/17 Urine Culture Result 1 (LIZZETTE) - Final, Complete





Medication


Medications





Current Medications


Acetaminophen (Tylenol) 500 mg 1X PRN  PRN PO MILD PAIN / TEMP;  Start 5/20/17 

at 13:45;  Stop 5/21/17 at 13:44


Albumin Human 200 ml @  200 mls/hr 1X PRN  PRN IV Hypotension;  Start 5/20/17 

at 13:45;  Stop 5/20/17 at 19:44;  Status DC


Diphenhydramine HCl (Benadryl) 25 mg 1X PRN  PRN IV ITCHING;  Start 5/20/17 at 

13:45;  Stop 5/21/17 at 13:44


Info (Do NOT chart on this entry -- for MONITORING) 1 each PRN DAILY  PRN MC 

SEE COMMENTS;  Start 5/20/17 at 16:30;  Stop 5/22/17 at 16:29


Info (PHARMACY MONITORING -- do not chart) 1 each PRN DAILY  PRN MC SEE COMMENTS

;  Start 5/20/17 at 13:45;  Status UNV


Info (PHARMACY MONITORING -- do not chart) 1 each PRN DAILY  PRN MC SEE COMMENTS

;  Start 5/20/17 at 13:45;  Status UNV


Iohexol (Omnipaque 300 Mg/ml) 75 ml 1X  ONCE IV ;  Start 5/20/17 at 16:30;  

Stop 5/20/17 at 16:31;  Status DC


Polyethylene Glycol (miraLAX PACKET) 17 gm PRN DAILY  PRN PO CONSTIPATION Last 

administered on 5/20/17at 17:06;  Start 5/20/17 at 17:15


Sodium Chloride 1,000 ml @  1,000 mls/hr Q1H PRN IV hypotension;  Start 5/20/17 

at 13:42;  Stop 5/20/17 at 19:41;  Status DC





Comment


Review of Relevant


I have reviewed the following items ant (where applicable) has been applied.











YAN TRUJILLO MD May 21, 2017 11:48

## 2017-05-21 NOTE — PDOC
PROGRESS NOTES


Assessment


Assessment


5-





IMPRESSION:


Hypertensive emergency, /112 mmHg.


Hypertensive encephalopathy.


Metabolic encephalopathy.


Chest pain.


Heart murmur SM 3-4/6.


SOB


Renal failure on dialysis.


UTI


HTN


HLD


Anemia





RECOMMENDATIONS/PLAN:


BP control.


Cardiac evaluation.


Treat medical diseases.


Continue dialysis.


OT/PT.





SUBJECTIVE:


She stated she knew she has HTN for over 20 years, but she did not take 

medications due to no symptoms.





OBJECTIVE:


No focalized neurological motor or sensory deficits.





PAST MEDICAL AND SURGICAL HISTORY:


Please see H&P





ALLERGY:


Reviewed.





MEDICATIONS:


Refer to MAR





REVIEW OF SYSTEMS:


Constitutional: No malnutrition, weight loss, cachexia.


Head: No traumatic brain or head injury.


Skin: No edema, or rash.


Ear: No infection.


Eyes: No vision loss, or diplopia.


Nose: No bleeding or purulent discharges.


Hearing: No hearing decrease. 


Neck: No injury.


Breast: No history of cancer, masses, or discharges.


Cardiac: HTN, HLD, chest pain


Pulmonary: SOB


GI: No GI Ulcer, GI bleeding.


Urinary/genital: UTI. ESRD on dialysis.


Endocrine: No cousin face, craniofacial dysmorphism, polydactyly.


Skeletomuscular: No muscular atrophy, deformity.


Neurological: see  HP.


Psychiatric: Denies drug use/abuse.


Otherwise, not pertinant14-point review of systems.





PHYSICAL EXAMINATION:   


General appearance in subacute distress.  


HEENT:  Normocephalic and nontraumatic.  Eyes, nose, ears, and throat are 

unremarkable. Hearing decrease.


Neck is supple. No lymphadenopathy. No Crepitus.


Cardiovascular:  S1, S2, regular rate and rhythm. SM 3/6 heard.  


Pulmonary:  Clear to auscultation bilaterally. 


Abdomen:  Bowel sounds are positive.    


Extremities:  No rash, lesions, or edema.  


No restriction of range of motion





NEUROLOGICAL  EXAMINATION:


Awake.


Oriented partially to time, place and person.


PERRL.


EOMI.


CN: no focal findings.


Muscle tone: within normal.


Muscle strength: 5-


DTR: 2


Plantar reflex: Flexor response bilaterally 


Gait: not examined in bed.


Sensory exam: no abnormal findings.


No acute cerebellar signs elicited.


F-T-N test fine.





Objective


Objective





Vital Signs








  Date Time  Temp Pulse Resp B/P (MAP) Pulse Ox O2 Delivery O2 Flow Rate FiO2


 


5/21/17 08:20  71  167/90    


 


5/21/17 08:00      Room Air  


 


5/21/17 06:10 97.6  18  98   





 97.6       














Intake and Output 


 


 5/21/17





 07:00


 


Intake Total 1060 ml


 


Output Total 325 ml


 


Balance 735 ml


 


 


 


Intake Oral 1060 ml


 


Output Urine Total 325 ml


 


# Voids 1











Vitals Signs


Vitals





 VS - Last 72 Hours, by Label








  Date Time  Temp Pulse Resp B/P (MAP) Pulse Ox O2 Delivery O2 Flow Rate FiO2


 


5/21/17 08:20  71  167/90    


 


5/21/17 08:19  71  167/90    


 


5/21/17 08:19  71  167/90    


 


5/21/17 08:18  71  167/90    


 


5/21/17 08:16  71  167/90    


 


5/21/17 08:15  71  167/90    


 


5/21/17 08:00      Room Air  


 


5/21/17 06:10 97.6 69 18 167/90 (115) 98 Room Air  





 97.6       


 


5/21/17 03:33 97.9 73 18 140/80 (100) 96 Room Air  





 97.9       


 


5/20/17 23:21 97.5 69 16 126/63 (84) 97 Room Air  





 97.5       


 


5/20/17 20:37  76  147/79    


 


5/20/17 20:36  76  147/79    


 


5/20/17 20:36  76  147/79    


 


5/20/17 20:35  76  147/79    


 


5/20/17 19:39      Room Air  


 


5/20/17 19:09 98.3 76 16 147/79 (101) 95 Room Air  





 98.3       


 


5/20/17 17:00  80  166/74    


 


5/20/17 17:00  81  166/74    


 


5/20/17 11:35 98.2 68 16 151/73 (99) 97 Room Air  





 98.2       


 


5/20/17 09:28  70  128/58 (81)    


 


5/20/17 08:31      Room Air  


 


5/20/17 08:18  69  187/89    


 


5/20/17 08:11  69  187/89    


 


5/20/17 08:10  69  187/89    


 


5/20/17 08:10  69  187/89    


 


5/20/17 08:09  68  187/89    


 


5/20/17 08:09  69  187/89    


 


5/20/17 07:20 97.7 67 18 214/93 (133) 99 Room Air  





 97.7       











Laboratory


Laboratory





Microbiology


5/18/17 Urine Culture - Final, Complete


          


5/18/17 Urine Culture Result 1 (LIZZETTE) - Final, Complete





Medication


Medications





Current Medications


Acetaminophen (Tylenol) 500 mg 1X PRN  PRN PO MILD PAIN / TEMP;  Start 5/20/17 

at 13:45;  Stop 5/21/17 at 13:44


Albumin Human 200 ml @  200 mls/hr 1X PRN  PRN IV Hypotension;  Start 5/20/17 

at 13:45;  Stop 5/20/17 at 19:44;  Status DC


Diphenhydramine HCl (Benadryl) 25 mg 1X PRN  PRN IV ITCHING;  Start 5/20/17 at 

13:45;  Stop 5/21/17 at 13:44


Info (Do NOT chart on this entry -- for MONITORING) 1 each PRN DAILY  PRN MC 

SEE COMMENTS;  Start 5/20/17 at 16:30;  Stop 5/22/17 at 16:29


Info (PHARMACY MONITORING -- do not chart) 1 each PRN DAILY  PRN MC SEE COMMENTS

;  Start 5/20/17 at 13:45;  Status UNV


Info (PHARMACY MONITORING -- do not chart) 1 each PRN DAILY  PRN MC SEE COMMENTS

;  Start 5/20/17 at 13:45;  Status UNV


Iohexol (Omnipaque 300 Mg/ml) 75 ml 1X  ONCE IV ;  Start 5/20/17 at 16:30;  

Stop 5/20/17 at 16:31;  Status DC


Polyethylene Glycol (miraLAX PACKET) 17 gm PRN DAILY  PRN PO CONSTIPATION Last 

administered on 5/20/17at 17:06;  Start 5/20/17 at 17:15


Sodium Chloride 1,000 ml @  1,000 mls/hr Q1H PRN IV hypotension;  Start 5/20/17 

at 13:42;  Stop 5/20/17 at 19:41;  Status DC





Comment


Review of Relevant


I have reviewed the following items ant (where applicable) has been applied.











YAN TRUJILLO MD May 21, 2017 11:47

## 2017-05-21 NOTE — PDOC
PULMONARY PROGRESS NOTES


Subjective


feels better


no further CP


Vitals





Vital Signs








  Date Time  Temp Pulse Resp B/P (MAP) Pulse Ox O2 Delivery O2 Flow Rate FiO2


 


5/21/17 08:20  71  167/90    


 


5/21/17 08:00      Room Air  


 


5/21/17 06:10 97.6  18  98   





 97.6       








General:  Alert, No acute distress


Lungs:  Clear


Cardiovascular:  S1


Abdomen:  Soft, Non-tender


Neuro Exam:  Alert


Extremities:  No Edema


Skin:  Warm


Medications





Active Scripts








 Medications  Dose


 Route/Sig


 Max Daily Dose Days Date Category


 


 Lorazepam 0.5 Mg


 Tablet  0.5 Mg


 PO PRN DAILY


    5/18/17 Reported


 


 Furosemide 40 Mg


 Tablet  40 Mg


 PO QODAY


    5/18/17 Reported


 


 Hydralazine Hcl


 50 Mg Tablet  50 Mg


 PO TID


    5/18/17 Reported


 


 Labetalol Hcl 300


 Mg Tablet  300 Mg


 PO BID


    5/18/17 Reported


 


 Lisinopril 20 Mg


 Tablet  20 Mg


 PO QHS


    5/18/17 Reported


 


 Lisinopril 20 Mg


 Tablet  40 Mg


 PO DAILY


    10/11/16 Reported


 


 Cyclobenzaprine


 Hcl 5 Mg Tablet  1 Tab


 PO BID


    2/9/16 Reported


 


 Hydrocodone-Apap


 5-325  **


  (Hydrocodone


 Bit/Acetaminophen)


 1 Each Tablet  1 Tab


 PO PRN Q6HRS PRN


    2/9/16 Reported


 


 Zofran Odt


  (Ondansetron) 8


 Mg Tab.rapdis  8 Mg


 PO BID PRN


    2/9/16 Reported


 


 Senna


  (Sennosides) 8.6


 Mg Capsule  8.6 Mg


 PO PRN PRN


    2/5/16 Reported


 


 Nephro-Svetlana


 Tablet (Folic


 Acid/Vitamin B


 Comp W-C) 0.8 Mg


 Tablet  1 Tab


 PO DAILY


    2/5/16 Reported


 


 Imdur (Isosorbide


 Mononitrate) 60


 Mg Tab.er.24h  60 Mg


 PO DAILY


    5/9/14 Reported


 


 Clonidine Hcl 0.3


 Mg Tablet  0.3 Mg


 PO TID


    5/9/14 Reported


 


 Norvasc


  (Amlodipine


 Besylate) 10 Mg


 Tablet  10 Mg


 PO DAILY


    5/9/14 Reported


 


 Protonix


  (Pantoprazole


 Sodium) 40 Mg


 Granpkt.dr  40 Mg


 PO DAILY


    5/3/14 Reported


 


 Neurontin


  (Gabapentin) 100


 Mg Capsule  100 Mg


 PO BID


    5/3/14 Reported


 


 Calcium Acetate


 667 Mg Capsule  667 Mg


 PO TIDWMEALS


    3/28/14 Reported


 


 Citalopram Hbr


  (Citalopram


 Hydrobromide) 20


 Mg Tablet  20 Mg


 PO DAILY


    12/27/13 Reported


 


 Children's


 Aspirin (Aspirin)


 81 Mg Tab.chew  81 Mg


 PO DAILY


    12/27/13 Reported


 


 Lipitor


  (Atorvastatin


 Calcium) 20 Mg


 Tablet  20 Mg


 PO HS


    12/27/13 Reported











Impression


.


1.  Progressive dyspnea in a patient with end-stage renal disease and has


significantly enlarged cardiac silhouette and also mildly prominent interstitial


markings.  She has a history of moderate pericardial effusion in February 2016. 


Following is the diff. diagnosis


a.   No further progression of pericardial effusion, moderate by ct, small by 

echo. No temponade


2.   ? mild Interstitial edema contributing to the patient's symptoms.


3.   No significant history of tobacco use.


4.   Severe pulmonary HTN on repeat echo. No PE/ ? primary pulmonary HTN.needs 

RHC


5.   ESRD/ HD





Plan


.





1. No PE


2.  Follow cardiology recommendation.


3.  Hemodialysis with ultrafiltration 


4.  Follow renal recommendations.


5.  Continue anti-ischemic medications per Cardiology.


6.  Discussed with RN 


7.  will need right heart cath, scheduled in HUSEYIN Merino MD May 21, 2017 10:31

## 2017-05-21 NOTE — PDOC
PROGRESS NOTES


Subjective


Subjective


The patient looks and feels better today.





Objective


Objective





Vital Signs








  Date Time  Temp Pulse Resp B/P (MAP) Pulse Ox O2 Delivery O2 Flow Rate FiO2


 


5/21/17 11:25 98.4 70 18 123/63 (83) 100 Room Air  





 98.4       


 


5/19/17 19:40       2.0 














Intake and Output 


 


 5/21/17





 07:00


 


Intake Total 1060 ml


 


Output Total 325 ml


 


Balance 735 ml


 


 


 


Intake Oral 1060 ml


 


Output Urine Total 325 ml


 


# Voids 1











Physical Exam


Abdomen:  Normal bowel sounds


Heart:  Regular rate


Extremities:  No clubbing


General:  No acute distress


Lungs:  Other (mildly decreased breath sounds)





Assessment


Assessment


Problems


Medical Problems:


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


Status: Acute  





Assessment


1.  Hypertensive encephalopathy: resolved


2.  Acute on chronic diastolic CHF: peaked troponin at 0.06, demand mediated. 

Compensated


3.  Malignant HTN: resolved.


4.  ESRD: HD per nephrology


5.  Treatment noncompliance.  


6.  Anemia of chronic disease


7.  Mild AI


8.  Severe Pulmonary HTN with severe TR and RA dilation.  CTA negative for PE.  

Right heart cath tomorrow.  Risks and benefits discussed with the patient and 

she agrees to proceed.





Comment


Review of Relevant


I have reviewed the following items ant (where applicable) has been applied.


Labs





Microbiology


5/18/17 Urine Culture - Final, Complete


          


5/18/17 Urine Culture Result 1 (LIZZETTE) - Final, Complete


Medications





Current Medications


Hydralazine HCl (Apresoline) 10 mg 1X  ONCE IVP  Last administered on 5/18/17at 

12:16;  Start 5/18/17 at 12:15;  Stop 5/18/17 at 12:16;  Status DC


Morphine Sulfate 2 mg 1X  ONCE IV  Last administered on 5/18/17at 12:17;  Start 

5/18/17 at 12:15;  Stop 5/18/17 at 12:16;  Status DC


Albuterol/ Ipratropium (Duoneb) 3 ml 1X  ONCE NEB  Last administered on 5/18/ 17at 13:04;  Start 5/18/17 at 13:00;  Stop 5/18/17 at 13:01;  Status DC


Furosemide (Lasix) 40 mg 1X  ONCE IVP  Last administered on 5/18/17at 13:08;  

Start 5/18/17 at 13:00;  Stop 5/18/17 at 13:01;  Status DC


Hydralazine HCl (Apresoline) 20 mg 1X  ONCE IVP  Last administered on 5/18/17at 

13:31;  Start 5/18/17 at 13:30;  Stop 5/18/17 at 13:31;  Status DC


Ondansetron HCl (Zofran) 4 mg PRN Q8HRS  PRN IV NAUSEA/VOMITING Last 

administered on 5/18/17at 14:35;  Start 5/18/17 at 13:30;  Stop 5/19/17 at 13:29

;  Status DC


Nicardipine HCl 50 mg/Sodium Chloride 270 ml @ 0 mls/hr CONT  PRN IV SEE I/O 

RECORD Last administered on 5/19/17at 02:02;  Start 5/18/17 at 14:15;  Stop 5/19 /17 at 12:00;  Status DC


Amlodipine Besylate (Norvasc) 10 mg DAILY PO ;  Start 5/18/17 at 16:30;  Stop 5/ 18/17 at 16:30;  Status DC


Aspirin (Ecotrin) 81 mg DAILYWBKFT PO  Last administered on 5/21/17at 08:19;  

Start 5/19/17 at 08:00


Atorvastatin Calcium (Lipitor) 20 mg HS PO  Last administered on 5/20/17at 20:36

;  Start 5/18/17 at 21:00


Calcium Acetate (Phoslo) 667 mg TIDWMEALS PO  Last administered on 5/21/17at 12:

09;  Start 5/18/17 at 17:00


Citalopram Hydrobromide (CeleXA) 20 mg DAILY PO  Last administered on 5/21/17at 

08:14;  Start 5/19/17 at 09:00


Clonidine HCl (Catapres) 0.3 mg TID PO  Last administered on 5/21/17at 08:20;  

Start 5/18/17 at 16:30


Vitamin B Complex/ Vitamin C (Bruna-Svetlana) 1 tab DAILY PO  Last administered on 5/ 21/17at 08:19;  Start 5/19/17 at 09:00


Furosemide (Lasix) 40 mg QTUTHSASU@0900 PO ;  Start 5/18/17 at 16:00;  Stop 5/18 /17 at 16:10;  Status DC


Gabapentin (Neurontin) 100 mg BID PO  Last administered on 5/21/17at 08:18;  

Start 5/18/17 at 21:00


Hydralazine HCl (Apresoline) 50 mg TID PO  Last administered on 5/21/17at 08:18

;  Start 5/18/17 at 16:00


Acetaminophen/ Hydrocodone Bitart (Lortab 5/325) 1 tab PRN Q6HRS  PRN PO PAIN;  

Start 5/18/17 at 16:00


Isosorbide Mononitrate (Imdur) 60 mg DAILY PO  Last administered on 5/21/17at 08

:19;  Start 5/19/17 at 09:00


Lisinopril (Prinivil) 20 mg QHS PO  Last administered on 5/20/17at 20:36;  

Start 5/18/17 at 21:00


Lisinopril (Prinivil) 40 mg DAILY PO  Last administered on 5/21/17at 08:16;  

Start 5/18/17 at 16:00


Lorazepam (Ativan) 0.5 mg PRN DAILY  PRN PO ANXIETY / AGITATION;  Start 5/18/17 

at 16:00


Cyclobenzaprine HCl (Flexeril) 5 mg BID PO  Last administered on 5/21/17at 08:19

;  Start 5/18/17 at 21:00


Labetalol HCl (Trandate) 300 mg BID PO  Last administered on 5/21/17at 08:19;  

Start 5/18/17 at 16:00


Ondansetron HCl (Zofran Odt) 4 mg PRN Q8HRS  PRN PO NAUSEA/VOMITING;  Start 5/18 /17 at 16:00


Pantoprazole Sodium (Protonix) 40 mg DAILYAC PO  Last administered on 5/21/17at 

08:19;  Start 5/19/17 at 07:30


Sennosides (Senna) 8.6 mg PRN BID  PRN PO CONSTIPATION;  Start 5/18/17 at 16:00


Amlodipine Besylate (Norvasc) 10 mg DAILY PO  Last administered on 5/21/17at 08:

15;  Start 5/18/17 at 16:30


Furosemide (Lasix) 40 mg QTUTHSASU@0900 PO  Last administered on 5/21/17at 08:19

;  Start 5/20/17 at 09:00


Sodium Chloride 1,000 ml @  1,000 mls/hr Q1H PRN IV hypotension;  Start 5/19/17 

at 14:00;  Stop 5/19/17 at 19:59;  Status DC


Sodium Chloride 1,000 ml @  400 mls/hr Q2H30M PRN IV PATENCY;  Start 5/19/17 at 

14:00;  Stop 5/20/17 at 01:59;  Status DC


Info (PHARMACY MONITORING -- do not chart) 1 each PRN DAILY  PRN MC SEE COMMENTS

;  Start 5/19/17 at 16:00


Info (PHARMACY MONITORING -- do not chart) 1 each PRN DAILY  PRN MC SEE COMMENTS

;  Start 5/20/17 at 13:45;  Status UNV


Sodium Chloride 1,000 ml @  1,000 mls/hr Q1H PRN IV hypotension;  Start 5/20/17 

at 13:42;  Stop 5/20/17 at 19:41;  Status DC


Albumin Human 200 ml @  200 mls/hr 1X PRN  PRN IV Hypotension;  Start 5/20/17 

at 13:45;  Stop 5/20/17 at 19:44;  Status DC


Acetaminophen (Tylenol) 500 mg 1X PRN  PRN PO MILD PAIN / TEMP;  Start 5/20/17 

at 13:45;  Stop 5/21/17 at 13:44


Diphenhydramine HCl (Benadryl) 25 mg 1X PRN  PRN IV ITCHING;  Start 5/20/17 at 

13:45;  Stop 5/21/17 at 13:44


Info (PHARMACY MONITORING -- do not chart) 1 each PRN DAILY  PRN MC SEE COMMENTS

;  Start 5/20/17 at 13:45;  Status UNV


Iohexol (Omnipaque 300 Mg/ml) 75 ml 1X  ONCE IV ;  Start 5/20/17 at 16:30;  

Stop 5/20/17 at 16:31;  Status DC


Info (Do NOT chart on this entry -- for MONITORING) 1 each PRN DAILY  PRN MC 

SEE COMMENTS;  Start 5/20/17 at 16:30;  Stop 5/22/17 at 16:29


Polyethylene Glycol (miraLAX PACKET) 17 gm PRN DAILY  PRN PO CONSTIPATION Last 

administered on 5/20/17at 17:06;  Start 5/20/17 at 17:15





Active Scripts


Active


Reported


Lorazepam 0.5 Mg Tablet 0.5 Mg PO PRN DAILY


Furosemide 40 Mg Tablet 40 Mg PO QODAY


Hydralazine Hcl 50 Mg Tablet 50 Mg PO TID


Labetalol Hcl 300 Mg Tablet 300 Mg PO BID


Lisinopril 20 Mg Tablet 20 Mg PO QHS


Lisinopril 20 Mg Tablet 40 Mg PO DAILY


Cyclobenzaprine Hcl 5 Mg Tablet 1 Tab PO BID


Hydrocodone-Apap 5-325  ** (Hydrocodone Bit/Acetaminophen) 1 Each Tablet 1 Tab 

PO PRN Q6HRS PRN


Zofran Odt (Ondansetron) 8 Mg Tab.rapdis 8 Mg PO BID PRN


Senna (Sennosides) 8.6 Mg Capsule 8.6 Mg PO PRN PRN


Nephro-Svetlana Tablet (Folic Acid/Vitamin B Comp W-C) 0.8 Mg Tablet 1 Tab PO DAILY


Imdur (Isosorbide Mononitrate) 60 Mg Tab.er.24h 60 Mg PO DAILY


Clonidine Hcl 0.3 Mg Tablet 0.3 Mg PO TID


Norvasc (Amlodipine Besylate) 10 Mg Tablet 10 Mg PO DAILY


Protonix (Pantoprazole Sodium) 40 Mg Granpkt.dr 40 Mg PO DAILY


Neurontin (Gabapentin) 100 Mg Capsule 100 Mg PO BID


Calcium Acetate 667 Mg Capsule 667 Mg PO TIDWMEALS


Citalopram Hbr (Citalopram Hydrobromide) 20 Mg Tablet 20 Mg PO DAILY


Children's Aspirin (Aspirin) 81 Mg Tab.chew 81 Mg PO DAILY


Lipitor (Atorvastatin Calcium) 20 Mg Tablet 20 Mg PO HS


Vitals/I & O





Vital Sign - Last 24 Hours








 5/20/17 5/20/17 5/20/17 5/20/17





 17:00 17:00 19:09 19:39


 


Temp   98.3 





   98.3 


 


Pulse 81 80 76 


 


Resp   16 


 


B/P (MAP) 166/74 166/74 147/79 (101) 


 


Pulse Ox   95 


 


O2 Delivery   Room Air Room Air


 


    





    





 5/20/17 5/20/17 5/20/17 5/20/17





 20:35 20:36 20:36 20:37


 


Pulse 76 76 76 76


 


B/P (MAP) 147/79 147/79 147/79 147/79





 5/20/17 5/21/17 5/21/17 5/21/17





 23:21 03:33 06:10 08:00


 


Temp 97.5 97.9 97.6 





 97.5 97.9 97.6 


 


Pulse 69 73 69 


 


Resp 16 18 18 


 


B/P (MAP) 126/63 (84) 140/80 (100) 167/90 (115) 


 


Pulse Ox 97 96 98 


 


O2 Delivery Room Air Room Air Room Air Room Air


 


    





    





 5/21/17 5/21/17 5/21/17 5/21/17





 08:15 08:16 08:18 08:19


 


Pulse 71 71 71 71


 


B/P (MAP) 167/90 167/90 167/90 167/90





 5/21/17 5/21/17 5/21/17 





 08:19 08:20 11:25 


 


Temp   98.4 





   98.4 


 


Pulse 71 71 70 


 


Resp   18 


 


B/P (MAP) 167/90 167/90 123/63 (83) 


 


Pulse Ox   100 


 


O2 Delivery   Room Air 














Intake and Output   


 


 5/20/17 5/20/17 5/21/17





 15:00 23:00 07:00


 


Intake Total  660 ml 400 ml


 


Output Total  325 ml 


 


Balance  335 ml 400 ml

















ALBA RUSSELL MD May 21, 2017 13:21

## 2017-05-21 NOTE — PDOC
PROGRESS NOTES


Subjective


Subjective


feels better today





Objective


Objective





Vital Signs








  Date Time  Temp Pulse Resp B/P (MAP) Pulse Ox O2 Delivery O2 Flow Rate FiO2


 


5/21/17 08:20  71  167/90    


 


5/21/17 08:00      Room Air  


 


5/21/17 06:10 97.6  18  98   





 97.6       














Intake and Output 


 


 5/21/17





 07:00


 


Intake Total 1060 ml


 


Output Total 325 ml


 


Balance 735 ml


 


 


 


Intake Oral 1060 ml


 


Output Urine Total 325 ml


 


# Voids 1











Physical Exam


Abdomen:  Soft, No tenderness, Other (truncal obesity)


Heart:  Regular rate, Normal S1, Normal S2, Other (3-4/6 systolic murmur to LLS 

border; S4)


Extremities:  No cyanosis, Other (LA AV dialysis fistula positive thrill and 

bruit.  Bounding preipheral pulses; 2+ bilateral LE pitting edema)


General:  Oriented X3, Cooperative, mild distress


Lungs:  Other (basilar crackles)


MUSCULOSKELETAL:  Osteoarthritic changes both hands


Neuro:  Normal speech, Sensation intact


Psych/Mental Status:  Other (drowsy)


Skin:  No breakdown, No significant lesion





Diagnosis


Problem List


Problems


Medical Problems:


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


Status: Acute  











Assessment


Assessment


Problems


Medical Problems:


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


Status: Acute  





ASSESSMENT/PLAN:


1.  Hypertensive encephalopathy


2.  Acute on chronic diastolic CHF


3.  Malignant HTN; on Cardene


4.  ESRD; dialysis m,w,f


5.  HA, possible stroke symptoms 


6.  Possible non-compliance; 


7.  Anemia of chronic disease





PLAN:cardiac  cath tomorrow.


dialysis tomorrow


CTA-ve for PE.


dialysis yesterday


ECHO  noted





1.  Check echo to assess LV function -55% EJF, mild pericardial effusion


2.  CT head -ve. Consult neuro appreciated


3.  Resume home antiHTN therapy. Titrate Cardene gtt off as able.


4.  Received Lasix 40mg IV in ED. Further fluid offloading in HD per nephrology


5.  Reinforced medication compliance


Problems:  





Plan


Plan of Care


Problems


Medical Problems:


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


Status: Acute  








Comment


Review of Relevant


I have reviewed the following items ant (where applicable) has been applied.


Labs





Microbiology


5/18/17 Urine Culture - Final, Complete


          


5/18/17 Urine Culture Result 1 (LIZZETTE) - Final, Complete


Medications





Current Medications


Acetaminophen (Tylenol) 500 mg 1X PRN  PRN PO MILD PAIN / TEMP;  Start 5/20/17 

at 13:45;  Stop 5/21/17 at 13:44


Albumin Human 200 ml @  200 mls/hr 1X PRN  PRN IV Hypotension;  Start 5/20/17 

at 13:45;  Stop 5/20/17 at 19:44;  Status DC


Diphenhydramine HCl (Benadryl) 25 mg 1X PRN  PRN IV ITCHING;  Start 5/20/17 at 

13:45;  Stop 5/21/17 at 13:44


Info (Do NOT chart on this entry -- for MONITORING) 1 each PRN DAILY  PRN MC 

SEE COMMENTS;  Start 5/20/17 at 16:30;  Stop 5/22/17 at 16:29


Info (PHARMACY MONITORING -- do not chart) 1 each PRN DAILY  PRN MC SEE COMMENTS

;  Start 5/20/17 at 13:45;  Status UNV


Info (PHARMACY MONITORING -- do not chart) 1 each PRN DAILY  PRN MC SEE COMMENTS

;  Start 5/20/17 at 13:45;  Status UNV


Iohexol (Omnipaque 300 Mg/ml) 75 ml 1X  ONCE IV ;  Start 5/20/17 at 16:30;  

Stop 5/20/17 at 16:31;  Status DC


Polyethylene Glycol (miraLAX PACKET) 17 gm PRN DAILY  PRN PO CONSTIPATION Last 

administered on 5/20/17at 17:06;  Start 5/20/17 at 17:15


Sodium Chloride 1,000 ml @  1,000 mls/hr Q1H PRN IV hypotension;  Start 5/20/17 

at 13:42;  Stop 5/20/17 at 19:41;  Status DC


Vitals/I & O





Vital Sign - Last 24 Hours








 5/20/17 5/20/17 5/20/17 5/20/17





 11:35 17:00 17:00 19:09


 


Temp 98.2   98.3





 98.2   98.3


 


Pulse 68 81 80 76


 


Resp 16   16


 


B/P (MAP) 151/73 (99) 166/74 166/74 147/79 (101)


 


Pulse Ox 97   95


 


O2 Delivery Room Air   Room Air


 


    





    





 5/20/17 5/20/17 5/20/17 5/20/17





 19:39 20:35 20:36 20:36


 


Pulse  76 76 76


 


B/P (MAP)  147/79 147/79 147/79


 


O2 Delivery Room Air   





 5/20/17 5/20/17 5/21/17 5/21/17





 20:37 23:21 03:33 06:10


 


Temp  97.5 97.9 97.6





  97.5 97.9 97.6


 


Pulse 76 69 73 69


 


Resp  16 18 18


 


B/P (MAP) 147/79 126/63 (84) 140/80 (100) 167/90 (115)


 


Pulse Ox  97 96 98


 


O2 Delivery  Room Air Room Air Room Air


 


    





    





 5/21/17 5/21/17 5/21/17 5/21/17





 08:00 08:15 08:16 08:18


 


Pulse  71 71 71


 


B/P (MAP)  167/90 167/90 167/90


 


O2 Delivery Room Air   





 5/21/17 5/21/17 5/21/17 





 08:19 08:19 08:20 


 


Pulse 71 71 71 


 


B/P (MAP) 167/90 167/90 167/90 














Intake and Output   


 


 5/20/17 5/20/17 5/21/17





 15:00 23:00 07:00


 


Intake Total  660 ml 400 ml


 


Output Total  325 ml 


 


Balance  335 ml 400 ml

















KAROLINA ENRIQUE MD May 21, 2017 11:18

## 2017-05-22 VITALS — SYSTOLIC BLOOD PRESSURE: 128 MMHG | DIASTOLIC BLOOD PRESSURE: 62 MMHG

## 2017-05-22 VITALS — DIASTOLIC BLOOD PRESSURE: 76 MMHG | SYSTOLIC BLOOD PRESSURE: 158 MMHG

## 2017-05-22 VITALS — DIASTOLIC BLOOD PRESSURE: 82 MMHG | SYSTOLIC BLOOD PRESSURE: 153 MMHG

## 2017-05-22 VITALS — SYSTOLIC BLOOD PRESSURE: 148 MMHG | DIASTOLIC BLOOD PRESSURE: 78 MMHG

## 2017-05-22 VITALS — SYSTOLIC BLOOD PRESSURE: 146 MMHG | DIASTOLIC BLOOD PRESSURE: 77 MMHG

## 2017-05-22 VITALS — DIASTOLIC BLOOD PRESSURE: 78 MMHG | SYSTOLIC BLOOD PRESSURE: 159 MMHG

## 2017-05-22 VITALS — DIASTOLIC BLOOD PRESSURE: 84 MMHG | SYSTOLIC BLOOD PRESSURE: 156 MMHG

## 2017-05-22 VITALS — DIASTOLIC BLOOD PRESSURE: 74 MMHG | SYSTOLIC BLOOD PRESSURE: 127 MMHG

## 2017-05-22 VITALS — DIASTOLIC BLOOD PRESSURE: 83 MMHG | SYSTOLIC BLOOD PRESSURE: 156 MMHG

## 2017-05-22 LAB
ANION GAP SERPL CALC-SCNC: 7 MMOL/L (ref 6–14)
BUN SERPL-MCNC: 50 MG/DL (ref 7–20)
CALCIUM SERPL-MCNC: 9.2 MG/DL (ref 8.5–10.1)
CHLORIDE SERPL-SCNC: 104 MMOL/L (ref 98–107)
CO2 SERPL-SCNC: 29 MMOL/L (ref 21–32)
CREAT SERPL-MCNC: 6.1 MG/DL (ref 0.6–1)
GFR SERPLBLD BASED ON 1.73 SQ M-ARVRAT: 8.7 ML/MIN
GLUCOSE SERPL-MCNC: 104 MG/DL (ref 70–99)
POTASSIUM SERPL-SCNC: 5 MMOL/L (ref 3.5–5.1)
SODIUM SERPL-SCNC: 140 MMOL/L (ref 136–145)

## 2017-05-22 RX ADMIN — PANTOPRAZOLE SODIUM SCH MG: 40 TABLET, DELAYED RELEASE ORAL at 08:08

## 2017-05-22 RX ADMIN — GABAPENTIN SCH MG: 100 CAPSULE ORAL at 20:34

## 2017-05-22 RX ADMIN — CITALOPRAM HYDROBROMIDE SCH MG: 20 TABLET ORAL at 08:10

## 2017-05-22 RX ADMIN — ASPIRIN SCH MG: 81 TABLET, COATED ORAL at 08:07

## 2017-05-22 RX ADMIN — LABETALOL HCL SCH MG: 100 TABLET, FILM COATED ORAL at 20:35

## 2017-05-22 RX ADMIN — POLYETHYLENE GLYCOL 3350 PRN GM: 17 POWDER, FOR SOLUTION ORAL at 08:07

## 2017-05-22 RX ADMIN — Medication SCH TAB: at 08:10

## 2017-05-22 RX ADMIN — LISINOPRIL SCH MG: 20 TABLET ORAL at 20:36

## 2017-05-22 RX ADMIN — CYCLOBENZAPRINE HYDROCHLORIDE SCH MG: 10 TABLET, FILM COATED ORAL at 08:10

## 2017-05-22 RX ADMIN — ATORVASTATIN CALCIUM SCH MG: 20 TABLET, FILM COATED ORAL at 20:36

## 2017-05-22 RX ADMIN — LISINOPRIL SCH MG: 20 TABLET ORAL at 08:08

## 2017-05-22 RX ADMIN — CALCIUM ACETATE SCH MG: 667 CAPSULE ORAL at 17:00

## 2017-05-22 RX ADMIN — ISOSORBIDE MONONITRATE SCH MG: 30 TABLET, EXTENDED RELEASE ORAL at 08:10

## 2017-05-22 RX ADMIN — GABAPENTIN SCH MG: 100 CAPSULE ORAL at 08:07

## 2017-05-22 RX ADMIN — LABETALOL HCL SCH MG: 100 TABLET, FILM COATED ORAL at 08:09

## 2017-05-22 RX ADMIN — CALCIUM ACETATE SCH MG: 667 CAPSULE ORAL at 12:00

## 2017-05-22 RX ADMIN — CYCLOBENZAPRINE HYDROCHLORIDE SCH MG: 10 TABLET, FILM COATED ORAL at 20:35

## 2017-05-22 RX ADMIN — CALCIUM ACETATE SCH MG: 667 CAPSULE ORAL at 08:00

## 2017-05-22 NOTE — CARD
--------------- APPROVED REPORT --------------





Procedure(s) performed: Right Heart Catheterization

Left Heart Catheterization + Coronary angiography



HISTORY

The patient is a 51 year-old female with a history of : renal failure with dialysis, hypertension, dy
slipidemia.



INDICATION

The indication(s) include : non-STEMI , chest pain, dyspnea.



PROCEDURE NARRATIVE

Appropriate informed consent the patient was brought to the catheterization laboratory in the right g
roin was prepped in usual sterile fashion.



Arterial venous access was obtained in the right common femoral artery and vein via the modified Seld
jose raul technique under 2% lidocaine local anesthesia via a J-tipped guidewire. A 5 Micronesian sheath was i
ntroduced into the right femoral artery and a 8 Micronesian sheath was placed in the right femoral vein. A
 8 Micronesian PA catheter was advanced to the wedge position and right heart pressures and saturations we
re measured. Subsequently, a 5 Micronesian JL4 and a 5 Micronesian 3 DRC catheter were used to engage the left 
and right coronary arteries to perform selective angiography in multiple views.



Findings:

Left main large caliber vessel without any significant angiographic disease

LAD large caliber vessel without any significant disease that wraps around the apex of the heart in a
 hyperdominant fashion.

First diagonal small to moderate caliber vessel without any significant disease

Left circumflex large caliber vessel without any significant disease

First obtuse marginal large caliber vessel without any significant disease

RCA is a small to moderate caliber nondominant vessel.



Right heart catheterization:

RA: 22 mm Hg

RV: 90/23

PA: 90/25/50

PCWP: 25

PA sat: 80 % (likely due to left to right fistula from hemodialysis)

CO: 6.2L/min





Conclusion

1. Severely elevated biventricular filling pressures. 

2. Pulmonary hypertension with a mean PA of 50 mm Hg and PASP of 90 mm Hg, related to LVH and volume 
overload. 

3. Elevated wedge pressure at 25-30mm Hg. 

4. No angiographic evidence of coronary artery disease. 



Recommendations

Recommend aggressive fluid removal with HD and consider repeat RHC in the future.

## 2017-05-22 NOTE — PDOC
PROGRESS NOTES


Subjective


Subjective


no new complaints





Objective


Objective





Vital Signs








  Date Time  Temp Pulse Resp B/P (MAP) Pulse Ox O2 Delivery O2 Flow Rate FiO2


 


5/22/17 08:09  68  156/83    


 


5/22/17 08:00      Room Air  


 


5/22/17 07:00 98.1  18  95   





 98.1       














Intake and Output 


 


 5/22/17





 07:00


 


Intake Total 1800 ml


 


Output Total 650 ml


 


Balance 1150 ml


 


 


 


Intake Oral 1800 ml


 


Output Urine Total 650 ml











Physical Exam


Abdomen:  Normal bowel sounds


Heart:  Regular rate


Extremities:  No clubbing


General:  No acute distress


Lungs:  Other (mildly decreased breath sounds)


MUSCULOSKELETAL:  Osteoarthritic changes both hands


Neuro:  Normal speech, Sensation intact


Psych/Mental Status:  Other (drowsy)


Skin:  No breakdown, No significant lesion





Diagnosis


Problem List


Problems


Medical Problems:


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


Status: Acute  











Assessment


Assessment


Problems


Medical Problems:


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


Status: Acute  





ASSESSMENT/PLAN:


1.  Hypertensive encephalopathy


2.  Acute on chronic diastolic CHF


3.  Malignant HTN; off Cardene drip


4.  ESRD; dialysis m,w,f


5.  HA, possible stroke symptoms 


6.  Possible non-compliance; 


7.  Anemia of chronic disease





PLAN:cardiac  cath today


dialysis today later


CTA-ve for PE.


dialysis 


ECHO  noted


spoke with renal





1.  Check echo to assess LV function -55% EJF, mild pericardial effusion


2.  CT head -ve. Consult neuro appreciated


3.  Resume home antiHTN therapy. Titrate Cardene gtt off as able.


4.  Received Lasix 40mg IV in ED. Further fluid offloading in HD per nephrology


5.  Reinforced medication compliance


Problems:  





Plan


Plan of Care


Problems


Medical Problems:


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


Status: Acute  








Comment


Review of Relevant


I have reviewed the following items ant (where applicable) has been applied.


Labs





Laboratory Tests








Test


  5/22/17


03:30


 


Sodium Level


  140 mmol/L


(136-145)


 


Potassium Level


  5.0 mmol/L


(3.5-5.1)


 


Chloride Level


  104 mmol/L


()


 


Carbon Dioxide Level


  29 mmol/L


(21-32)


 


Anion Gap 7 (6-14) 


 


Blood Urea Nitrogen


  50 mg/dL


(7-20)


 


Creatinine


  6.1 mg/dL


(0.6-1.0)


 


Estimated GFR


(Cockcroft-Gault) 8.7 


 


 


Glucose Level


  104 mg/dL


(70-99)


 


Calcium Level


  9.2 mg/dL


(8.5-10.1)








Microbiology


5/18/17 Urine Culture - Final, Complete


          


5/18/17 Urine Culture Result 1 (LIZZETTE) - Final, Complete


Medications





Current Medications


Sodium Chloride 1,000 ml @  60 mls/hr [id number] IV  Last administered on 5/22/ 17at 08:07;  Start 5/22/17 at 08:00


Vitals/I & O





Vital Sign - Last 24 Hours








 5/21/17 5/21/17 5/21/17 5/21/17





 11:25 15:03 15:04 15:05


 


Temp 98.4   97.5





 98.4   97.5


 


Pulse 70 74 69 76


 


Resp 18   18


 


B/P (MAP) 123/63 (83) 138/65 138/65 138/65 (89)


 


Pulse Ox 100   98


 


O2 Delivery Room Air   Room Air


 


    





    





 5/21/17 5/21/17 5/21/17 5/21/17





 18:52 20:00 21:00 21:01


 


Temp 98.2   





 98.2   


 


Pulse 68  68 68


 


Resp 18   


 


B/P (MAP) 124/69 (87)  124/69 124/69


 


Pulse Ox 95   


 


O2 Delivery Room Air Room Air  


 


    





    





 5/21/17 5/21/17 5/21/17 5/22/17





 21:02 21:03 23:04 03:57


 


Temp   98.4 97.9





   98.4 97.9


 


Pulse 68 68 71 68


 


Resp   18 18


 


B/P (MAP) 124/69 124/69 144/85 (104) 156/83 (107)


 


Pulse Ox   94 97


 


O2 Delivery   Room Air Room Air


 


    





    





 5/22/17 5/22/17 5/22/17 5/22/17





 07:00 08:00 08:08 08:08


 


Temp 98.1   





 98.1   


 


Pulse 69  68 68


 


Resp 18   


 


B/P (MAP) 159/78 (105)  156/83 156/83


 


Pulse Ox 95   


 


O2 Delivery Room Air Room Air  


 


    





    





 5/22/17 5/22/17 5/22/17 





 08:09 08:09 08:09 


 


Pulse 68 68 68 


 


B/P (MAP) 156/83 156/83 156/83 














Intake and Output   


 


 5/21/17 5/21/17 5/22/17





 15:00 23:00 07:00


 


Intake Total 300 ml 950 ml 550 ml


 


Output Total  300 ml 350 ml


 


Balance 300 ml 650 ml 200 ml

















KAROLINA ENRIQUE MD May 22, 2017 10:15

## 2017-05-22 NOTE — PDOC
Renal-Progress Notes


Subjective Notes


Notes


NONE





History of Present Illness


Hx of present illness


NO CHANGE





Vitals


Vitals





Vital Signs








  Date Time  Temp Pulse Resp B/P (MAP) Pulse Ox O2 Delivery O2 Flow Rate FiO2


 


5/22/17 08:09  68  156/83    


 


5/22/17 08:00      Room Air  


 


5/22/17 07:00 98.1  18  95   





 98.1       








Weight


Weight [ ]





I.O.


Intake and Output











Intake and Output 


 


 5/22/17





 06:59


 


Intake Total 1800 ml


 


Output Total 650 ml


 


Balance 1150 ml


 


 


 


Intake Oral 1800 ml


 


Output Urine Total 650 ml











Labs


Labs





Laboratory Tests








Test


  5/22/17


03:30


 


Sodium Level


  140 mmol/L


(136-145)


 


Potassium Level


  5.0 mmol/L


(3.5-5.1)


 


Chloride Level


  104 mmol/L


()


 


Carbon Dioxide Level


  29 mmol/L


(21-32)


 


Anion Gap 7 (6-14) 


 


Blood Urea Nitrogen


  50 mg/dL


(7-20)


 


Creatinine


  6.1 mg/dL


(0.6-1.0)


 


Estimated GFR


(Cockcroft-Gault) 8.7 


 


 


Glucose Level


  104 mg/dL


(70-99)


 


Calcium Level


  9.2 mg/dL


(8.5-10.1)











Micro


Micro





Microbiology


5/18/17 Urine Culture - Final, Complete


          


5/18/17 Urine Culture Result 1 (LIZZETTE) - Final, Complete





Review of Systems


Constitutional:  yes: weakness, alert, oriented


Ears/Nose/Throat:  Yes: no symptom reported


Eyes:  Yes: no symptom reported


Pulmonary:  Yes dyspnea


Cardiovascular:  Yes no symptom reported


Gastrointestional:  Yes: constipation


Musculoskeletal:  Yes: muscle stiffness


Skin:  Yes no symptom reported





Physical Exam


General Appearance:  no apparent distress


Skin:  warm


Respiratory:  bilateral CTA


Heart:  S1S2


Abdomen:  soft, bowel sounds present


Genitourinary:  bladder flat


Neurology:  alert, oriented





Assessment


Assessment


IMP





ESRD


ANEMIA


DYSPNEA-? PULM HTN





PLAN





CTA NEG


HD TODAY


UF TO DW


RIGHT HEART CATH PENDING











KYM BRAND MD May 22, 2017 10:56

## 2017-05-22 NOTE — HP
ADMIT DATE:  05/19/2017



PATIENT LOCATION:  [facility].



ATTENDING PHYSICIAN:  Dr. León.



PRIMARY CARE PHYSICIAN:  Dr. Boyer.



REASON FOR ADMISSION TO THE HOSPITAL:  Shortness of breath.  The patient is on

dialysis, accelerated malignant hypertension.



HISTORY OF PRESENT ILLNESS:  The patient is a 51-year-old female, the patient of

Dr. Boyer.  She has a history of hypertension and she is on renal failure, on

hemodialysis for at least 3 years and she goes to dialysis on Monday, Wednesday,

Friday and she was having increased shortness of breath for last week, it

progressively worse and she had dialysis on Wednesday and then Thursday.  She

was having shortness of breath and she came to the hospital.  The patient has

high blood pressure, accelerated hypertension and malignant hypertension.  The

patient was put on Nipride drip and was admitted to the cardiac step down unit.



PAST MEDICAL HISTORY:  History of hypertension, pulmonary hypertension,

hyperlipidemia, GERD, anemia, depression, rheumatoid arthritis and hemodialysis.



PAST SURGICAL HISTORY:  She had a hysterectomy, tubal ligation and AV fistula,

right arm.



FAMILY HISTORY:  Positive for hypertension, cousin is on dialysis.



SOCIAL HISTORY:  No history of smoking, alcohol or drug abuse.



ALLERGIES:  NO KNOWN DRUG ALLERGIES.



MEDICATIONS AT HOME:  She is on amlodipine 10 mg daily, aspirin 81 mg daily,

atorvastatin 20 mg daily, calcium 667 mg 3 times daily, citalopram 20 mg daily,

clonidine 0.3 three times a day, cyclobenzaprine 5 mg twice a day, Nephro-Svetlana 1

daily, Lasix 40 mg daily, gabapentin 100 mg twice a day, hydralazine 50 mg 3

times daily, hydrocodone 5/325 q. 6, isosorbide 60 mg daily, labetalol 300 mg

twice a day, lisinopril 40 mg in the morning and 20 mg in the evening, lorazepam

0.5 daily, Zofran 8 mg twice a day, Protonix 40 mg daily, nephrovite 1 daily.



REVIEW OF SYMPTOMS:

CARDIAC:  No chest pain, only comes in with shortness of breath.

GASTROINTESTINAL:  No nausea or vomiting.

NEUROLOGICAL:  No weakness.

The rest of the 14-system was reviewed and negative.



PHYSICAL EXAMINATION:

GENERAL:  The patient is not in any distress, sleepy.

VITAL SIGNS:  Temperature 97, pulse 96, respirations 24, blood pressure 228/102,

99% on room air.

HEENT:  Head is atraumatic.  Pupils equal.  Oral cavity:  No congestion.

NECK:  Supple.  Thyroid not enlarged.  JVD not elevated.

CHEST:  Symmetrical.  The patient has AV shunt in the right upper arm ,thrill is

present.

CARDIOVASCULAR:  S1, S2.  No murmurs.

LUNGS:  Clear to auscultation.  No wheezing.

ABDOMEN:  Soft, no mass palpable.

EXTERNAL GENITALIA:  No Serrato.

RECTAL:  Deferred.

EXTREMITIES:  No calf tenderness, no edema.  Pulses 1+.

NEUROLOGIC:  Moving all extremities.  No focal deficit noted.



LABORATORY DATA:  Shows a white count of 4.6, hemoglobin 8.3, platelets 300. 

Electrolytes show sodium 147, potassium 4.3, chloride 111, bicarbonate 26, BUN

31, creatinine 5.4, AST 99, ALT 98.  BNP more than 35,000.  Albumin 2.9.  TSH is

1.9.



The patient had a CT head was negative.  Chest x-ray shows moderate congestive

changes and EKG done, report is pending.



FINAL IMPRESSION:

1.  Malignant hypertension.

2.  End-stage renal disease, on dialysis.

3.  Hypertension.

4.  Hyperlipidemia.

5.   Presence of AV shunt for hemodialysis.

6.  Anemia of chronic disease.  Hemoglobin low at 8.

7. Confusion  due to Hypertensive  encephalopathy.



PLAN:  At this time, was to admit to hospital.  The patient was put on Cardene

drip to bring the blood pressure down.  The patient also has seen by Neurology,

has change in mental status secondary to hypertensive encephalopathy and also a

Nephrology consult will dialyze the patient and also pulmonary consult.CT head -
ve.



 



______________________________

KAROLINA LEÓN MD



DR:  ANDREW/argentina  JOB#:  [id number] / [id number]

DD:  05/19/2017 10:11  DT:  05/19/2017 12:25

NAS

## 2017-05-22 NOTE — PDOC
PROGRESS NOTES


Assessment


Assessment


IMPRESSION:


Hypertensive emergency, /112 mmHg.


Hypertensive encephalopathy.


Metabolic encephalopathy.


Chest pain.


Heart murmur SM 3-4/6.


SOB


Renal failure on dialysis.


UTI


HTN


HLD


Anemia





RECOMMENDATIONS/PLAN:


BP control.


Treat medical diseases.


Continue dialysis.


OT/PT.





SUBJECTIVE:


She stated she knew she has HTN for over 20 years, but she did not take 

medications due to no symptoms. 





OBJECTIVE:


No focalized neurological motor or sensory deficits.





PAST MEDICAL AND SURGICAL HISTORY:


Please see H&P





ALLERGY:


Reviewed.





MEDICATIONS:


Refer to MAR





REVIEW OF SYSTEMS:


Constitutional: No malnutrition, weight loss, cachexia.


Head: No traumatic brain or head injury.


Skin: No edema, or rash.


Ear: No infection.


Eyes: No vision loss, or diplopia.


Nose: No bleeding or purulent discharges.


Hearing: No hearing decrease. 


Neck: No injury.


Breast: No history of cancer, masses, or discharges.


Cardiac: HTN, HLD, chest pain


Pulmonary: SOB


GI: No GI Ulcer, GI bleeding.


Urinary/genital: UTI. ESRD on dialysis.


Endocrine: No cousin face, craniofacial dysmorphism, polydactyly.


Skeletomuscular: No muscular atrophy, deformity.


Neurological: see  HP.


Psychiatric: Denies drug use/abuse.


Otherwise, not pertinant14-point review of systems.





PHYSICAL EXAMINATION:   


General appearance in subacute distress.  


HEENT:  Normocephalic and nontraumatic.  Eyes, nose, ears, and throat are 

unremarkable. Hearing decrease.


Neck is supple. No lymphadenopathy. No Crepitus.


Cardiovascular:  S1, S2, regular rate and rhythm. SM 3/6 heard.  


Pulmonary:  Clear to auscultation bilaterally. 


Abdomen:  Bowel sounds are positive.    


Extremities:  No rash, lesions, or edema.  


No restriction of range of motion





NEUROLOGICAL  EXAMINATION:


Awake.


Oriented partially to time, place and person.


PERRL.


EOMI.


CN: no focal findings.


Muscle tone: within normal.


Muscle strength: 5-


DTR: 2


Plantar reflex: Flexor response bilaterally 


Gait: not examined in bed.


Sensory exam: no abnormal findings.


No acute cerebellar signs elicited.


F-T-N test fine.





Objective


Objective





Vital Signs








  Date Time  Temp Pulse Resp B/P (MAP) Pulse Ox O2 Delivery O2 Flow Rate FiO2


 


5/22/17 11:48 98.2 69 19 153/82 (105) 98 Room Air  





 98.2       














Intake and Output 


 


 5/22/17





 06:59


 


Intake Total 1800 ml


 


Output Total 650 ml


 


Balance 1150 ml


 


 


 


Intake Oral 1800 ml


 


Output Urine Total 650 ml











Vitals Signs


Vitals





 VS - Last 72 Hours, by Label








  Date Time  Temp Pulse Resp B/P (MAP) Pulse Ox O2 Delivery O2 Flow Rate FiO2


 


5/22/17 11:48 98.2 69 19 153/82 (105) 98 Room Air  





 98.2       


 


5/22/17 08:09  68  156/83    


 


5/22/17 08:09  68  156/83    


 


5/22/17 08:09  68  156/83    


 


5/22/17 08:08  68  156/83    


 


5/22/17 08:08  68  156/83    


 


5/22/17 08:00      Room Air  


 


5/22/17 07:00 98.1 69 18 159/78 (105) 95 Room Air  





 98.1       


 


5/22/17 03:57 97.9 68 18 156/83 (107) 97 Room Air  





 97.9       


 


5/21/17 23:04 98.4 71 18 144/85 (104) 94 Room Air  





 98.4       


 


5/21/17 21:03  68  124/69    


 


5/21/17 21:02  68  124/69    


 


5/21/17 21:01  68  124/69    


 


5/21/17 21:00  68  124/69    


 


5/21/17 20:00      Room Air  


 


5/21/17 18:52 98.2 68 18 124/69 (87) 95 Room Air  





 98.2       


 


5/21/17 15:05 97.5 76 18 138/65 (89) 98 Room Air  





 97.5       


 


5/21/17 15:04  69  138/65    


 


5/21/17 15:03  74  138/65    


 


5/21/17 11:25 98.4 70 18 123/63 (83) 100 Room Air  





 98.4       


 


5/21/17 08:20  71  167/90    


 


5/21/17 08:19  71  167/90    


 


5/21/17 08:19  71  167/90    


 


5/21/17 08:18  71  167/90    


 


5/21/17 08:16  71  167/90    


 


5/21/17 08:15  71  167/90    


 


5/21/17 08:00      Room Air  











Laboratory


Laboratory





Laboratory Tests








Test


  5/22/17


03:30


 


Sodium Level


  140 mmol/L


(136-145)


 


Potassium Level


  5.0 mmol/L


(3.5-5.1)


 


Chloride Level


  104 mmol/L


()


 


Carbon Dioxide Level


  29 mmol/L


(21-32)


 


Anion Gap 7 (6-14) 


 


Blood Urea Nitrogen


  50 mg/dL


(7-20)


 


Creatinine


  6.1 mg/dL


(0.6-1.0)


 


Estimated GFR


(Cockcroft-Gault) 8.7 


 


 


Glucose Level


  104 mg/dL


(70-99)


 


Calcium Level


  9.2 mg/dL


(8.5-10.1)








Microbiology


5/18/17 Urine Culture - Final, Complete


          


5/18/17 Urine Culture Result 1 (LIZZETTE) - Final, Complete





Medication


Medications





Current Medications


Fentanyl Citrate (Fentanyl 2ml Vial) 100 mcg 1X  ONCE IV ;  Start 5/22/17 at 13:

30;  Stop 5/22/17 at 13:31;  Status DC


Fentanyl Citrate (Fentanyl 2ml Vial) 100 mcg STK-MED ONCE .ROUTE ;  Start 5/22/ 17 at 13:03;  Stop 5/22/17 at 13:04;  Status DC


Heparin Sodium/ Sodium Chloride 500 ml @  As Directed STK-MED ONCE .ROUTE ;  

Start 5/22/17 at 13:07;  Stop 5/22/17 at 13:08;  Status DC


Heparin Sodium/ Sodium Chloride 1,000 unit 1X  ONCE IART ;  Start 5/22/17 at 13:

30;  Stop 5/22/17 at 13:31;  Status DC


Lidocaine HCl 20 ml 1X  ONCE IJ ;  Start 5/22/17 at 13:30;  Stop 5/22/17 at 13:

31;  Status DC


Lidocaine HCl 20 ml STK-MED ONCE .ROUTE ;  Start 5/22/17 at 13:03;  Stop 5/22/ 17 at 13:04;  Status DC


Midazolam HCl (Versed) 2 mg 1X  ONCE IV ;  Start 5/22/17 at 13:30;  Stop 5/22/ 17 at 13:31;  Status DC


Midazolam HCl (Versed) 2 mg STK-MED ONCE .ROUTE ;  Start 5/22/17 at 13:03;  

Stop 5/22/17 at 13:04;  Status DC


Sodium Chloride 1,000 ml @  60 mls/hr [id number] IV  Last administered on 5/22/ 17at 08:07;  Start 5/22/17 at 08:00





Comment


Review of Relevant


I have reviewed the following items ant (where applicable) has been applied.











YAN TRUJILLO MD May 22, 2017 13:37

## 2017-05-22 NOTE — PDOC
PULMONARY PROGRESS NOTES


Subjective


no increase soa


wants to go home


Vitals





Vital Signs








  Date Time  Temp Pulse Resp B/P (MAP) Pulse Ox O2 Delivery O2 Flow Rate FiO2


 


5/22/17 14:01  66 16  97   


 


5/22/17 14:01      Room Air  


 


5/22/17 11:48 98.2   153/82 (105)    





 98.2       








ROS:  No Nausea, No Chest Pain, No Abdominal Pain, No Increase Cough


General:  Alert, No acute distress


Lungs:  Clear


Cardiovascular:  S1


Abdomen:  Soft, Non-tender


Neuro Exam:  Alert


Extremities:  No Edema


Skin:  Warm


Labs





Laboratory Tests








Test


  5/22/17


03:30


 


Sodium Level


  140 mmol/L


(136-145)


 


Potassium Level


  5.0 mmol/L


(3.5-5.1)


 


Chloride Level


  104 mmol/L


()


 


Carbon Dioxide Level


  29 mmol/L


(21-32)


 


Anion Gap 7 (6-14) 


 


Blood Urea Nitrogen


  50 mg/dL


(7-20)


 


Creatinine


  6.1 mg/dL


(0.6-1.0)


 


Estimated GFR


(Cockcroft-Gault) 8.7 


 


 


Glucose Level


  104 mg/dL


(70-99)


 


Calcium Level


  9.2 mg/dL


(8.5-10.1)








Laboratory Tests








Test


  5/22/17


03:30


 


Sodium Level


  140 mmol/L


(136-145)


 


Potassium Level


  5.0 mmol/L


(3.5-5.1)


 


Chloride Level


  104 mmol/L


()


 


Carbon Dioxide Level


  29 mmol/L


(21-32)


 


Anion Gap 7 (6-14) 


 


Blood Urea Nitrogen


  50 mg/dL


(7-20)


 


Creatinine


  6.1 mg/dL


(0.6-1.0)


 


Estimated GFR


(Cockcroft-Gault) 8.7 


 


 


Glucose Level


  104 mg/dL


(70-99)


 


Calcium Level


  9.2 mg/dL


(8.5-10.1)








Medications





Active Scripts








 Medications  Dose


 Route/Sig


 Max Daily Dose Days Date Category


 


 Lorazepam 0.5 Mg


 Tablet  0.5 Mg


 PO PRN DAILY


    5/18/17 Reported


 


 Furosemide 40 Mg


 Tablet  40 Mg


 PO QODAY


    5/18/17 Reported


 


 Hydralazine Hcl


 50 Mg Tablet  50 Mg


 PO TID


    5/18/17 Reported


 


 Labetalol Hcl 300


 Mg Tablet  300 Mg


 PO BID


    5/18/17 Reported


 


 Lisinopril 20 Mg


 Tablet  20 Mg


 PO QHS


    5/18/17 Reported


 


 Lisinopril 20 Mg


 Tablet  40 Mg


 PO DAILY


    10/11/16 Reported


 


 Cyclobenzaprine


 Hcl 5 Mg Tablet  1 Tab


 PO BID


    2/9/16 Reported


 


 Hydrocodone-Apap


 5-325  **


  (Hydrocodone


 Bit/Acetaminophen)


 1 Each Tablet  1 Tab


 PO PRN Q6HRS PRN


    2/9/16 Reported


 


 Zofran Odt


  (Ondansetron) 8


 Mg Tab.rapdis  8 Mg


 PO BID PRN


    2/9/16 Reported


 


 Senna


  (Sennosides) 8.6


 Mg Capsule  8.6 Mg


 PO PRN PRN


    2/5/16 Reported


 


 Nephro-Svetlana


 Tablet (Folic


 Acid/Vitamin B


 Comp W-C) 0.8 Mg


 Tablet  1 Tab


 PO DAILY


    2/5/16 Reported


 


 Imdur (Isosorbide


 Mononitrate) 60


 Mg Tab.er.24h  60 Mg


 PO DAILY


    5/9/14 Reported


 


 Clonidine Hcl 0.3


 Mg Tablet  0.3 Mg


 PO TID


    5/9/14 Reported


 


 Norvasc


  (Amlodipine


 Besylate) 10 Mg


 Tablet  10 Mg


 PO DAILY


    5/9/14 Reported


 


 Protonix


  (Pantoprazole


 Sodium) 40 Mg


 Granpkt.dr  40 Mg


 PO DAILY


    5/3/14 Reported


 


 Neurontin


  (Gabapentin) 100


 Mg Capsule  100 Mg


 PO BID


    5/3/14 Reported


 


 Calcium Acetate


 667 Mg Capsule  667 Mg


 PO TIDWMEALS


    3/28/14 Reported


 


 Citalopram Hbr


  (Citalopram


 Hydrobromide) 20


 Mg Tablet  20 Mg


 PO DAILY


    12/27/13 Reported


 


 Children's


 Aspirin (Aspirin)


 81 Mg Tab.chew  81 Mg


 PO DAILY


    12/27/13 Reported


 


 Lipitor


  (Atorvastatin


 Calcium) 20 Mg


 Tablet  20 Mg


 PO HS


    12/27/13 Reported











Impression


.


1.  Progressive dyspnea in a patient with end-stage renal disease and has


significantly enlarged cardiac silhouette and also mildly prominent interstitial


markings.  She has a history of moderate pericardial effusion in February 2016. 


a.   No further progression of pericardial effusion, moderate by ct, small by 

echo. No tamponade


2.   ? mild Interstitial edema contributing to the patient's symptoms.


3.   No significant history of tobacco use.


4.   Severe pulmonary HTN on repeat echo. No PE/ ? primary pulmonary HTN.


5.   ESRD/ HD





Plan


.





1. No PE


2.  Follow cardiology recommendation.


3.  Hemodialysis with ultrafiltration 


4.  Follow renal recommendations.


5.  Continue anti-ischemic medications per Cardiology.


6.  Discussed with RN 


7.  RHC today














MANJEET GUSMAN MD May 22, 2017 15:53

## 2017-05-23 VITALS — SYSTOLIC BLOOD PRESSURE: 156 MMHG | DIASTOLIC BLOOD PRESSURE: 72 MMHG

## 2017-05-23 VITALS — SYSTOLIC BLOOD PRESSURE: 141 MMHG | DIASTOLIC BLOOD PRESSURE: 66 MMHG

## 2017-05-23 VITALS — DIASTOLIC BLOOD PRESSURE: 72 MMHG | SYSTOLIC BLOOD PRESSURE: 156 MMHG

## 2017-05-23 RX ADMIN — PANTOPRAZOLE SODIUM SCH MG: 40 TABLET, DELAYED RELEASE ORAL at 08:48

## 2017-05-23 RX ADMIN — ISOSORBIDE MONONITRATE SCH MG: 30 TABLET, EXTENDED RELEASE ORAL at 08:50

## 2017-05-23 RX ADMIN — FUROSEMIDE SCH MG: 40 TABLET ORAL at 08:48

## 2017-05-23 RX ADMIN — LISINOPRIL SCH MG: 20 TABLET ORAL at 08:50

## 2017-05-23 RX ADMIN — ASPIRIN SCH MG: 81 TABLET, COATED ORAL at 08:48

## 2017-05-23 RX ADMIN — LABETALOL HCL SCH MG: 100 TABLET, FILM COATED ORAL at 08:51

## 2017-05-23 RX ADMIN — CALCIUM ACETATE SCH MG: 667 CAPSULE ORAL at 08:48

## 2017-05-23 RX ADMIN — Medication SCH TAB: at 08:49

## 2017-05-23 RX ADMIN — GABAPENTIN SCH MG: 100 CAPSULE ORAL at 08:49

## 2017-05-23 RX ADMIN — CYCLOBENZAPRINE HYDROCHLORIDE SCH MG: 10 TABLET, FILM COATED ORAL at 08:47

## 2017-05-23 RX ADMIN — POLYETHYLENE GLYCOL 3350 PRN GM: 17 POWDER, FOR SOLUTION ORAL at 06:27

## 2017-05-23 RX ADMIN — CITALOPRAM HYDROBROMIDE SCH MG: 20 TABLET ORAL at 08:48

## 2017-05-23 NOTE — PDOC
PULMONARY PROGRESS NOTES


Subjective


PT WITH LOTS OF QUESTIONS REGARDING HER DIAGNOSIS


Vitals





Vital Signs








  Date Time  Temp Pulse Resp B/P (MAP) Pulse Ox O2 Delivery O2 Flow Rate FiO2


 


5/23/17 08:51  75  156/72    


 


5/23/17 07:52      Room Air 2.0 


 


5/23/17 07:43 98.2  18  99   





 98.2       








ROS:  No Nausea, No Chest Pain, No Abdominal Pain, No Increase Cough


General:  Alert, No acute distress


Lungs:  Clear


Cardiovascular:  S1


Abdomen:  Soft, Non-tender


Neuro Exam:  Alert


Extremities:  No Edema


Skin:  Warm


Labs





Laboratory Tests








Test


  5/22/17


03:30


 


Sodium Level


  140 mmol/L


(136-145)


 


Potassium Level


  5.0 mmol/L


(3.5-5.1)


 


Chloride Level


  104 mmol/L


()


 


Carbon Dioxide Level


  29 mmol/L


(21-32)


 


Anion Gap 7 (6-14) 


 


Blood Urea Nitrogen


  50 mg/dL


(7-20)


 


Creatinine


  6.1 mg/dL


(0.6-1.0)


 


Estimated GFR


(Cockcroft-Gault) 8.7 


 


 


Glucose Level


  104 mg/dL


(70-99)


 


Calcium Level


  9.2 mg/dL


(8.5-10.1)








Medications





Active Scripts








 Medications  Dose


 Route/Sig


 Max Daily Dose Days Date Category


 


 Lorazepam 0.5 Mg


 Tablet  0.5 Mg


 PO PRN DAILY


    5/18/17 Reported


 


 Furosemide 40 Mg


 Tablet  40 Mg


 PO QODAY


    5/18/17 Reported


 


 Hydralazine Hcl


 50 Mg Tablet  50 Mg


 PO TID


    5/18/17 Reported


 


 Labetalol Hcl 300


 Mg Tablet  300 Mg


 PO BID


    5/18/17 Reported


 


 Lisinopril 20 Mg


 Tablet  20 Mg


 PO QHS


    5/18/17 Reported


 


 Lisinopril 20 Mg


 Tablet  40 Mg


 PO DAILY


    10/11/16 Reported


 


 Cyclobenzaprine


 Hcl 5 Mg Tablet  1 Tab


 PO BID


    2/9/16 Reported


 


 Hydrocodone-Apap


 5-325  **


  (Hydrocodone


 Bit/Acetaminophen)


 1 Each Tablet  1 Tab


 PO PRN Q6HRS PRN


    2/9/16 Reported


 


 Zofran Odt


  (Ondansetron) 8


 Mg Tab.rapdis  8 Mg


 PO BID PRN


    2/9/16 Reported


 


 Senna


  (Sennosides) 8.6


 Mg Capsule  8.6 Mg


 PO PRN PRN


    2/5/16 Reported


 


 Nephro-Svetlana


 Tablet (Folic


 Acid/Vitamin B


 Comp W-C) 0.8 Mg


 Tablet  1 Tab


 PO DAILY


    2/5/16 Reported


 


 Imdur (Isosorbide


 Mononitrate) 60


 Mg Tab.er.24h  60 Mg


 PO DAILY


    5/9/14 Reported


 


 Clonidine Hcl 0.3


 Mg Tablet  0.3 Mg


 PO TID


    5/9/14 Reported


 


 Norvasc


  (Amlodipine


 Besylate) 10 Mg


 Tablet  10 Mg


 PO DAILY


    5/9/14 Reported


 


 Protonix


  (Pantoprazole


 Sodium) 40 Mg


 Granpkt.dr  40 Mg


 PO DAILY


    5/3/14 Reported


 


 Neurontin


  (Gabapentin) 100


 Mg Capsule  100 Mg


 PO BID


    5/3/14 Reported


 


 Calcium Acetate


 667 Mg Capsule  667 Mg


 PO TIDWMEALS


    3/28/14 Reported


 


 Citalopram Hbr


  (Citalopram


 Hydrobromide) 20


 Mg Tablet  20 Mg


 PO DAILY


    12/27/13 Reported


 


 Children's


 Aspirin (Aspirin)


 81 Mg Tab.chew  81 Mg


 PO DAILY


    12/27/13 Reported


 


 Lipitor


  (Atorvastatin


 Calcium) 20 Mg


 Tablet  20 Mg


 PO HS


    12/27/13 Reported











Impression


.


1.   Acute resp failure sec to left heart failure


2.   Sec pulmonary HTN


3.   No significant history of tobacco use.


4.   Abnormal cxr


5.   ESRD/ HD





Plan


.


Case d/w Dr Keita, RHC numbers compatible with sec puln HTN related to both 

diastolic and systolic HF


will need diurese


NO PE, 


D/C HOME


I ANSWERED ALL QUESTIONS\


WENT OVER DIET RESTRICTIONS 

















MANJEET GUSMAN MD May 23, 2017 11:02

## 2017-05-23 NOTE — PDOC
CARDIO Progress Notes


Date and Time


Date of Service


5/23/2017


Time of Evaluation


1040





Subjective


Subjective:  No Chest Pain, No shortness of breath, No Palpitations, No 

Dizziness





Vitals


Vitals





Vital Signs








  Date Time  Temp Pulse Resp B/P (MAP) Pulse Ox O2 Delivery O2 Flow Rate FiO2


 


5/23/17 08:51  75  156/72    


 


5/23/17 07:52      Room Air 2.0 


 


5/23/17 07:43 98.2  18  99   





 98.2       








Weight


Weight [ ]





Input and Output


Intake and Output











Intake and Output 


 


 5/23/17





 07:00


 


Intake Total 300 ml


 


Output Total 500 ml


 


Balance -200 ml


 


 


 


Intake Oral 300 ml


 


Output Urine Total 500 ml











Microbiology


Micro





Microbiology


5/18/17 Urine Culture - Final, Complete


          


5/18/17 Urine Culture Result 1 (LIZZETTE) - Final, Complete





Review of Systems


Constitutional:  yes: weakness, alert, oriented


Ears/Nose/Throat:  Yes: no symptom reported


Eyes:  Yes: no symptom reported


Pulmonary:  Yes dyspnea


Cardiovascular:  Yes no symptom reported


Gastrointestional:  Yes: constipation


Musculoskeletal:  Yes: muscle stiffness


Skin:  Yes no symptom reported





Physical Exam


HEENT:  Neck Supple W Full Motion


Chest:  Symmetric


LUNGS:  Clear to Auscultation


Heart:  S1S2, RRR (SR, no acute changes), murmurs (5/6 systolic murmur to LLS 

border)


Abdomen:  Soft N/T


Extremities:  No Edema, No Calf Tenderness


Neurology:  alert, oriented, follow commands


Other Exams


right groin arteriotomy site intact, no swelling or erythema, neurovascular 

status intact





Assessment


Assessment


1.  Acute on chronic diastolic CHF: Multifactorial. Compensated


2.  Malignant HTN: was skipping meds, today complained of affordability.  BP 

otherwise better controlled


3.  ESRD


4.  Severe secondary pulmonary HTN with severe TR and RA dilation: S/P LHC/RHC 

with results below


   Conclusion


   1. Severely elevated biventricular filling pressures. 


   2. Pulmonary hypertension with a mean PA of 50 mm Hg and PASP of 90 mm Hg, 

related to LVH and volume overload. 


   3. Elevated wedge pressure at 25-30mm Hg. 


   4. No angiographic evidence of coronary artery disease. 





5.  Treatment noncompliance.  


6.  Anemia of chronic disease


7.  Mild AI





Recommendations





1. Continue current regimen. Titrate up meds per BP response. 


2. Again reinforced medication compliance 


3. Fluid off loading per HD per nephrology


4. Follow pulmonary recommendations


5. Consider repeat future RHC. Nothing further at this time.  will follow as 

needed.











CONCETTA FINN APRN May 23, 2017 12:06

## 2017-05-23 NOTE — PDOC
Renal-Progress Notes


Subjective Notes


Notes


NONE





History of Present Illness


Hx of present illness


BETTER





Vitals


Vitals





Vital Signs








  Date Time  Temp Pulse Resp B/P (MAP) Pulse Ox O2 Delivery O2 Flow Rate FiO2


 


5/23/17 08:51  75  156/72    


 


5/23/17 07:52      Room Air 2.0 


 


5/23/17 07:43 98.2  18  99   





 98.2       








Weight


Weight [ ]





I.O.


Intake and Output











Intake and Output 


 


 5/23/17





 07:00


 


Intake Total 300 ml


 


Output Total 500 ml


 


Balance -200 ml


 


 


 


Intake Oral 300 ml


 


Output Urine Total 500 ml











Micro


Micro





Microbiology


5/18/17 Urine Culture - Final, Complete


          


5/18/17 Urine Culture Result 1 (LIZZETTE) - Final, Complete





Review of Systems


Constitutional:  yes: weakness, alert, oriented


Ears/Nose/Throat:  Yes: no symptom reported


Eyes:  Yes: no symptom reported


Pulmonary:  Yes dyspnea


Cardiovascular:  Yes no symptom reported


Gastrointestional:  Yes: constipation


Musculoskeletal:  Yes: muscle stiffness


Skin:  Yes no symptom reported





Physical Exam


General Appearance:  no apparent distress


Skin:  warm


Respiratory:  bilateral CTA


Heart:  S1S2


Abdomen:  soft, bowel sounds present


Genitourinary:  bladder flat


Neurology:  alert, oriented





Assessment


Assessment


IMP





ESRD


ANEMIA


DYSPNEA


PULMONARY HTN





PLAN





OK FOR D/C FROM RENAL STANDPOINT


HD TOMORROW











KYM BRAND MD May 23, 2017 11:09

## 2017-05-23 NOTE — PDOC
PROGRESS NOTES


Assessment


Assessment


Hypertensive emergency, /112 mmHg.


Hypertensive encephalopathy.


Metabolic encephalopathy.


Chest pain.


Heart murmur SM 3-4/6.


SOB


Renal failure on dialysis.


UTI


HTN


HLD


Anemia





RECOMMENDATIONS/PLAN:


BP control.


Treat medical diseases.


Continue dialysis.


Patient education for treatment compliances.


FU with PCP.


FU with Neurology as needed.





SUBJECTIVE:


She stated she was doing better on 5/23.





OBJECTIVE:


No focalized neurological motor or sensory deficits.





PAST MEDICAL AND SURGICAL HISTORY:


Please see H&P





ALLERGY:


Reviewed.





MEDICATIONS:


Refer to MAR





REVIEW OF SYSTEMS:


Constitutional: No malnutrition, weight loss, cachexia.


Head: No traumatic brain or head injury.


Skin: No edema, or rash.


Ear: No infection.


Eyes: No vision loss, or diplopia.


Nose: No bleeding or purulent discharges.


Hearing: No hearing decrease. 


Neck: No injury.


Breast: No history of cancer, masses, or discharges.


Cardiac: HTN, HLD, chest pain


Pulmonary: SOB


GI: No GI Ulcer, GI bleeding.


Urinary/genital: UTI. ESRD on dialysis.


Endocrine: No cousin face, craniofacial dysmorphism, polydactyly.


Skeletomuscular: No muscular atrophy, deformity.


Neurological: see  HP.


Psychiatric: Denies drug use/abuse.


Otherwise, not pertinant14-point review of systems.





PHYSICAL EXAMINATION:   


General appearance in no acute distress.  


HEENT:  Normocephalic and nontraumatic.  Eyes, nose, ears, and throat are 

unremarkable. Hearing decrease.


Neck is supple. No lymphadenopathy. No Crepitus.


Cardiovascular:  S1, S2, regular rate and rhythm. SM 3/6 heard.  


Pulmonary:  Clear to auscultation bilaterally. 


Abdomen:  Bowel sounds are positive.    


Extremities:  No rash, lesions, or edema.  


No restriction of range of motion





NEUROLOGICAL  EXAMINATION:


Awake.


Oriented partially to time, place and person.


PERRL.


EOMI.


CN: no focal findings.


Muscle tone: within normal.


Muscle strength: 5-


DTR: 2


Plantar reflex: Flexor response bilaterally 


Gait: not examined in bed.


Sensory exam: no abnormal findings.


No acute cerebellar signs elicited.


F-T-N test fine.





Objective


Objective





Vital Signs








  Date Time  Temp Pulse Resp B/P (MAP) Pulse Ox O2 Delivery O2 Flow Rate FiO2


 


5/23/17 08:51  75  156/72    


 


5/23/17 07:52      Room Air 2.0 


 


5/23/17 07:43 98.2  18  99   





 98.2       














Intake and Output 


 


 5/23/17





 07:00


 


Intake Total 300 ml


 


Output Total 500 ml


 


Balance -200 ml


 


 


 


Intake Oral 300 ml


 


Output Urine Total 500 ml











Vitals Signs


Vitals





 VS - Last 72 Hours, by Label








  Date Time  Temp Pulse Resp B/P (MAP) Pulse Ox O2 Delivery O2 Flow Rate FiO2


 


5/23/17 08:51  75  156/72    


 


5/23/17 08:50  75  156/72    


 


5/23/17 08:50  75  156/72    


 


5/23/17 08:49  75  156/72    


 


5/23/17 08:49  75  156/72    


 


5/23/17 08:48  75  156/72    


 


5/23/17 07:52      Room Air 2.0 


 


5/23/17 07:43 98.2 75 18 156/72 (100) 99 Room Air  





 98.2       


 


5/23/17 04:16   18  96   


 


5/23/17 03:16   18  96 Room Air  


 


5/23/17 03:05 98.7 71 16 141/66 (91) 98 Room Air  





 98.7       


 


5/22/17 23:05 98.5 75 13 148/78 (101) 98 Room Air  





 98.5       


 


5/22/17 20:36  78  158/76    


 


5/22/17 20:35  78  158/76    


 


5/22/17 20:34  78  158/76    


 


5/22/17 20:34  78  158/76    


 


5/22/17 19:49      Room Air  


 


5/22/17 19:05 98.3 78 18 158/76 (103) 97 Room Air  





 98.3       


 


5/22/17 14:40  66  128/62 (84)    


 


5/22/17 14:30     97 Room Air  


 


5/22/17 14:25  66  127/74 (91)    


 


5/22/17 14:10  68  146/77 (100)    


 


5/22/17 14:01  66 16  97   


 


5/22/17 14:01   16  97 Room Air  


 


5/22/17 11:48 98.2 69 19 153/82 (105) 98 Room Air  





 98.2       


 


5/22/17 08:09  68  156/83    


 


5/22/17 08:09  68  156/83    


 


5/22/17 08:09  68  156/83    


 


5/22/17 08:08  68  156/83    


 


5/22/17 08:08  68  156/83    


 


5/22/17 08:00      Room Air  


 


5/22/17 07:00 98.1 69 18 159/78 (105) 95 Room Air  





 98.1       











Laboratory


Laboratory





Microbiology


5/18/17 Urine Culture - Final, Complete


          


5/18/17 Urine Culture Result 1 (LIZZETTE) - Final, Complete





Medication


Medications





Current Medications


Bisacodyl (Dulcolax Supp) 10 mg PRN DAILY  PRN OR CONSTIPATION Last 

administered on 5/23/17at 08:51;  Start 5/23/17 at 08:30;  Stop 5/23/17 at 12:41

;  Status DC


Info (Do NOT chart on this entry -- for MONITORING) 1 each PRN DAILY  PRN MC 

SEE COMMENTS;  Start 5/22/17 at 14:00;  Stop 5/23/17 at 12:41;  Status DC


Info (PHARMACY MONITORING -- do not chart) 1 each PRN DAILY  PRN MC SEE COMMENTS

;  Start 5/22/17 at 15:45;  Status UNV


Iohexol (Omnipaque 300 Mg/ml) 100 ml 1X  ONCE IART  Last administered on 5/22/ 17at 14:00;  Start 5/22/17 at 13:45;  Stop 5/22/17 at 13:46;  Status DC


Iohexol (Omnipaque 300 Mg/ml) 100 ml STK-MED ONCE .ROUTE ;  Start 5/22/17 at 13:

52;  Stop 5/22/17 at 13:53;  Status DC


Sodium Chloride 1,000 ml @  400 mls/hr Q2H30M PRN IV PATENCY;  Start 5/22/17 at 

15:36;  Stop 5/23/17 at 03:35;  Status DC


Sodium Chloride 1,000 ml @  1,000 mls/hr Q1H PRN IV hypotension;  Start 5/22/17 

at 15:36;  Stop 5/22/17 at 21:35;  Status DC





Comment


Review of Relevant


I have reviewed the following items ant (where applicable) has been applied.











YAN TRUJILLO MD May 23, 2017 13:37

## 2017-05-23 NOTE — PDOC
PROGRESS NOTES


Subjective


Subjective


constipated today





Objective


Objective





Vital Signs








  Date Time  Temp Pulse Resp B/P (MAP) Pulse Ox O2 Delivery O2 Flow Rate FiO2


 


5/23/17 08:51  75  156/72    


 


5/23/17 07:52      Room Air 2.0 


 


5/23/17 07:43 98.2  18  99   





 98.2       














Intake and Output 


 


 5/23/17





 07:00


 


Intake Total 300 ml


 


Output Total 500 ml


 


Balance -200 ml


 


 


 


Intake Oral 300 ml


 


Output Urine Total 500 ml











Physical Exam


Abdomen:  Normal bowel sounds


Heart:  Regular rate


Extremities:  No clubbing


General:  No acute distress


Lungs:  Other (mildly decreased breath sounds)


MUSCULOSKELETAL:  Osteoarthritic changes both hands


Neuro:  Normal speech, Sensation intact


Psych/Mental Status:  Other (drowsy)


Skin:  No breakdown, No significant lesion





Diagnosis


Problem List


Problems


Medical Problems:


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


Status: Acute  











Assessment


Assessment


Problems


Medical Problems:


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


Status: Acute  





ASSESSMENT/PLAN:


1.  Hypertensive encephalopathy


2.  Acute on chronic diastolic CHF


3.  Malignant HTN; off Cardene drip


4.  ESRD; dialysis m,w,f


5.  HA, possible stroke symptoms 


6.  Possible non-compliance; 


7.  Anemia of chronic disease





PLAN:cardiac  cath no blockages.


pul htn on cardiac  cath , spoke with  pulmonary.


home today.


dialysis tomoroow


CTA-ve for PE.


ECHO  noted, normal systolic function


spoke with renal





1.  Check echo to assess LV function -55% EJF, mild pericardial effusion


2.  CT head -ve. Consult neuro appreciated


3.  Resume home antiHTN therapy. Titrate Cardene gtt off as able.


4.  Received Lasix 40mg IV in ED. Further fluid offloading in HD per nephrology


5.  Reinforced medication compliance


Problems:  





Plan


Plan of Care


Problems


Medical Problems:


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


Status: Acute  








Comment


Review of Relevant


I have reviewed the following items ant (where applicable) has been applied.


Labs





Microbiology


5/18/17 Urine Culture - Final, Complete


          


5/18/17 Urine Culture Result 1 (LIZZETTE) - Final, Complete


Medications





Current Medications


Bisacodyl (Dulcolax Supp) 10 mg PRN DAILY  PRN SD CONSTIPATION Last 

administered on 5/23/17at 08:51;  Start 5/23/17 at 08:30


Fentanyl Citrate (Fentanyl 2ml Vial) 100 mcg 1X  ONCE IV  Last administered on 5 /22/17at 14:01;  Start 5/22/17 at 13:30;  Stop 5/22/17 at 13:31;  Status DC


Fentanyl Citrate (Fentanyl 2ml Vial) 100 mcg STK-MED ONCE .ROUTE ;  Start 5/22/ 17 at 13:03;  Stop 5/22/17 at 13:04;  Status DC


Heparin Sodium/ Sodium Chloride 500 ml @  As Directed STK-MED ONCE .ROUTE ;  

Start 5/22/17 at 13:07;  Stop 5/22/17 at 13:08;  Status DC


Heparin Sodium/ Sodium Chloride 1,000 unit 1X  ONCE IART  Last administered on 5 /22/17at 13:59;  Start 5/22/17 at 13:30;  Stop 5/22/17 at 13:31;  Status DC


Info (Do NOT chart on this entry -- for MONITORING) 1 each PRN DAILY  PRN MC 

SEE COMMENTS;  Start 5/22/17 at 14:00;  Stop 5/24/17 at 13:59


Info (PHARMACY MONITORING -- do not chart) 1 each PRN DAILY  PRN MC SEE COMMENTS

;  Start 5/22/17 at 15:45;  Status UNV


Iohexol (Omnipaque 300 Mg/ml) 100 ml 1X  ONCE IART  Last administered on 5/22/ 17at 14:00;  Start 5/22/17 at 13:45;  Stop 5/22/17 at 13:46;  Status DC


Iohexol (Omnipaque 300 Mg/ml) 100 ml STK-MED ONCE .ROUTE ;  Start 5/22/17 at 13:

52;  Stop 5/22/17 at 13:53;  Status DC


Lidocaine HCl 20 ml 1X  ONCE IJ  Last administered on 5/22/17at 14:00;  Start 5/ 22/17 at 13:30;  Stop 5/22/17 at 13:31;  Status DC


Lidocaine HCl 20 ml STK-MED ONCE .ROUTE ;  Start 5/22/17 at 13:03;  Stop 5/22/ 17 at 13:04;  Status DC


Midazolam HCl (Versed) 2 mg 1X  ONCE IV  Last administered on 5/22/17at 14:00;  

Start 5/22/17 at 13:30;  Stop 5/22/17 at 13:31;  Status DC


Midazolam HCl (Versed) 2 mg STK-MED ONCE .ROUTE ;  Start 5/22/17 at 13:03;  

Stop 5/22/17 at 13:04;  Status DC


Sodium Chloride 1,000 ml @  400 mls/hr Q2H30M PRN IV PATENCY;  Start 5/22/17 at 

15:36;  Stop 5/23/17 at 03:35;  Status DC


Sodium Chloride 1,000 ml @  1,000 mls/hr Q1H PRN IV hypotension;  Start 5/22/17 

at 15:36;  Stop 5/22/17 at 21:35;  Status DC


Vitals/I & O





Vital Sign - Last 24 Hours








 5/22/17 5/22/17 5/22/17 5/22/17





 11:48 14:01 14:01 14:10


 


Temp 98.2   





 98.2   


 


Pulse 69  66 68


 


Resp 19 16 16 


 


B/P (MAP) 153/82 (105)   146/77 (100)


 


Pulse Ox 98 97 97 


 


O2 Delivery Room Air Room Air  


 


    





    





 5/22/17 5/22/17 5/22/17 5/22/17





 14:25 14:30 14:40 19:05


 


Temp    98.3





    98.3


 


Pulse 66  66 78


 


Resp    18


 


B/P (MAP) 127/74 (91)  128/62 (84) 158/76 (103)


 


Pulse Ox  97  97


 


O2 Delivery  Room Air  Room Air


 


    





    





 5/22/17 5/22/17 5/22/17 5/22/17





 19:49 20:34 20:34 20:35


 


Pulse  78 78 78


 


B/P (MAP)  158/76 158/76 158/76


 


O2 Delivery Room Air   





 5/22/17 5/22/17 5/23/17 5/23/17





 20:36 23:05 03:05 03:16


 


Temp  98.5 98.7 





  98.5 98.7 


 


Pulse 78 75 71 


 


Resp  13 16 18


 


B/P (MAP) 158/76 148/78 (101) 141/66 (91) 


 


Pulse Ox  98 98 96


 


O2 Delivery  Room Air Room Air Room Air


 


    





    





 5/23/17 5/23/17 5/23/17 5/23/17





 04:16 07:43 07:52 08:48


 


Temp  98.2  





  98.2  


 


Pulse  75  75


 


Resp 18 18  


 


B/P (MAP)  156/72 (100)  156/72


 


Pulse Ox 96 99  


 


O2 Delivery  Room Air Room Air 


 


O2 Flow Rate   2.0 


 


    





    





 5/23/17 5/23/17 5/23/17 5/23/17





 08:49 08:49 08:50 08:50


 


Pulse 75 75 75 75


 


B/P (MAP) 156/72 156/72 156/72 156/72





 5/23/17   





 08:51   


 


Pulse 75   


 


B/P (MAP) 156/72   














Intake and Output   


 


 5/22/17 5/22/17 5/23/17





 15:00 23:00 07:00


 


Intake Total   300 ml


 


Output Total  100 ml 400 ml


 


Balance  -100 ml -100 ml

















KAROLINA ENRIQUE MD May 23, 2017 10:02

## 2017-07-25 NOTE — PDOC2
NEUROLOGY CONSULT


Date of Admission


Date of Admission


DATE: 5/18/17 


TIME: 16:03





Reason for Consult


Reason for Consult:


stroke symptoms





Referring Physician


Referring Physician:


Dr. León





Source


Source:  Chart review, Patient





History of Present Illness


History of Present Illness


The patient is a 51-year-old right-handed dialysis patient admitted with chest 

pain and dyspnea, found to be hypertensive. She was hypertensive at her primary 

physician's office last week, and he increased her medications, but she was 

still hypertensive at dialysis, yesterday, she says. She has had some drowsiness

, intermittent slurred speech, bilateral hand and foot numbness.





Past Medical History


Cardiovascular:  CAD, CHF (cardiomyopathy), HTN, Hyperlipidemia


CENTRAL NERVOUS SYSTEM:  Other (headache)


GI:  Constipation, GERD, Other (diarrhea)


Psych:  Anxiety, Depression


Rheumatologic:  Rheumatoid arthritis


Renal/:  Chronic renal failure (dialysis MWF)





Past Surgical History


Past Surgical History:  Other (dialysis fistula)





Family History


Family History:  Other (Lupus)





Social History


Social History


No tobacco or alcohol





Current Medications


Current Medications





Current Medications


Hydralazine HCl (Apresoline) 10 mg 1X  ONCE IVP  Last administered on 5/18/17at 

12:16;  Start 5/18/17 at 12:15;  Stop 5/18/17 at 12:16;  Status DC


Morphine Sulfate 2 mg 1X  ONCE IV  Last administered on 5/18/17at 12:17;  Start 

5/18/17 at 12:15;  Stop 5/18/17 at 12:16;  Status DC


Albuterol/ Ipratropium (Duoneb) 3 ml 1X  ONCE NEB  Last administered on 5/18/ 17at 13:04;  Start 5/18/17 at 13:00;  Stop 5/18/17 at 13:01;  Status DC


Furosemide (Lasix) 40 mg 1X  ONCE IVP  Last administered on 5/18/17at 13:08;  

Start 5/18/17 at 13:00;  Stop 5/18/17 at 13:01;  Status DC


Hydralazine HCl (Apresoline) 20 mg 1X  ONCE IVP  Last administered on 5/18/17at 

13:31;  Start 5/18/17 at 13:30;  Stop 5/18/17 at 13:31;  Status DC


Ondansetron HCl (Zofran) 4 mg PRN Q8HRS  PRN IV NAUSEA/VOMITING Last 

administered on 5/18/17at 14:35;  Start 5/18/17 at 13:30;  Stop 5/19/17 at 13:29


Nicardipine HCl 50 mg/Sodium Chloride 270 ml @ 0 mls/hr CONT  PRN IV SEE I/O 

RECORD Last administered on 5/18/17at 14:19;  Start 5/18/17 at 14:15


Amlodipine Besylate (Norvasc) 10 mg DAILY PO ;  Start 5/19/17 at 09:00


Aspirin (Ecotrin) 81 mg DAILYWBKFT PO ;  Start 5/19/17 at 08:00


Atorvastatin Calcium (Lipitor) 20 mg HS PO ;  Start 5/18/17 at 21:00


Calcium Acetate (Phoslo) 667 mg TIDWMEALS PO ;  Start 5/18/17 at 17:00


Citalopram Hydrobromide (CeleXA) 20 mg DAILY PO ;  Start 5/19/17 at 09:00


Clonidine HCl (Catapres) 0.3 mg TID PO ;  Start 5/18/17 at 21:00


Vitamin B Complex/ Vitamin C (Bruna-Svetlana) 1 tab DAILY PO ;  Start 5/19/17 at 09:

00


Furosemide (Lasix) 40 mg QODAY PO ;  Start 5/20/17 at 09:00


Gabapentin (Neurontin) 100 mg BID PO ;  Start 5/18/17 at 21:00


Hydralazine HCl (Apresoline) 50 mg TID PO ;  Start 5/18/17 at 21:00


Acetaminophen/ Hydrocodone Bitart (Lortab 5/325) 1 tab PRN Q6HRS  PRN PO PAIN;  

Start 5/18/17 at 16:00


Isosorbide Mononitrate (Imdur) 60 mg DAILY PO ;  Start 5/19/17 at 09:00


Lisinopril (Prinivil) 20 mg QHS PO ;  Start 5/18/17 at 21:00


Lisinopril (Prinivil) 40 mg DAILY PO ;  Start 5/19/17 at 09:00


Lorazepam (Ativan) 0.5 mg PRN DAILY  PRN PO ANXIETY / AGITATION;  Start 5/18/17 

at 16:00


Cyclobenzaprine HCl (Flexeril) 5 mg BID PO ;  Start 5/18/17 at 21:00


Labetalol HCl (Trandate) 300 mg BID PO ;  Start 5/18/17 at 21:00


Ondansetron HCl (Zofran Odt) 4 mg PRN Q8HRS  PRN PO NAUSEA/VOMITING;  Start 5/18 /17 at 16:00


Pantoprazole Sodium (Protonix) 40 mg DAILYAC PO ;  Start 5/19/17 at 07:30


Sennosides (Senna) 8.6 mg PRN BID  PRN PO CONSTIPATION;  Start 5/18/17 at 16:00





Active Scripts


Active


Reported


Lorazepam 0.5 Mg Tablet 0.5 Mg PO PRN DAILY


Furosemide 40 Mg Tablet 40 Mg PO QODAY


Hydralazine Hcl 50 Mg Tablet 50 Mg PO TID


Labetalol Hcl 300 Mg Tablet 300 Mg PO BID


Lisinopril 20 Mg Tablet 20 Mg PO QHS


Lisinopril 20 Mg Tablet 40 Mg PO DAILY


Cyclobenzaprine Hcl 5 Mg Tablet 1 Tab PO BID


Hydrocodone-Apap 5-325  ** (Hydrocodone Bit/Acetaminophen) 1 Each Tablet 1 Tab 

PO PRN Q6HRS PRN


Zofran Odt (Ondansetron) 8 Mg Tab.rapdis 8 Mg PO BID PRN


Senna (Sennosides) 8.6 Mg Capsule 8.6 Mg PO PRN PRN


Nephro-Svetlana Tablet (Folic Acid/Vitamin B Comp W-C) 0.8 Mg Tablet 1 Tab PO DAILY


Imdur (Isosorbide Mononitrate) 60 Mg Tab.er.24h 60 Mg PO DAILY


Clonidine Hcl 0.3 Mg Tablet 0.3 Mg PO TID


Norvasc (Amlodipine Besylate) 10 Mg Tablet 10 Mg PO DAILY


Protonix (Pantoprazole Sodium) 40 Mg Granpkt.dr 40 Mg PO DAILY


Neurontin (Gabapentin) 100 Mg Capsule 100 Mg PO BID


Calcium Acetate 667 Mg Capsule 667 Mg PO TIDWMEALS


Citalopram Hbr (Citalopram Hydrobromide) 20 Mg Tablet 20 Mg PO DAILY


Children's Aspirin (Aspirin) 81 Mg Tab.chew 81 Mg PO DAILY


Lipitor (Atorvastatin Calcium) 20 Mg Tablet 20 Mg PO HS





Allergies


Allergies:  


Coded Allergies:  


     No Known Drug Allergies (Unverified , 10/20/16)





ROS


Review of System


No fevers, chills, dyspepsia, hematochezia, melena. Positive for dyspnea and 

chest pain. 14-point review of systems otherwise negative





Physical Exam


Physical Examination


PHYSICAL EXAMINATION:  


Vital signs: see above.  


General appearance is normal and in no acute distress.  


HEENT:  Normocephalic and nontraumatic.  Eyes, nose, ears, and throat are 

unremarkable.  


Neck is supple. No lymphadenopathy. No bruits are heard over the carotid 

artery.  No crepitus. 


NEUROLOGICAL EXAMINATION:  


Mental Status Examination:  Sleepy, arouses easily. Oriented to time, place, 

and person. Answers questions and follows commends. Pupils are equal round and 

reactive to light and accommodation.  Funduscopic exam:  No papilledema.  

Extraocular movements are intact.   Visual field exam shows no defect on the 

direct confrontation.  No motor or sensory deficits on the facial exam.  Uvula 

in the midline and the soft palate elevated symmetrically.  No deviation of the 

tongue to any direction.  Gross hearing is normal.  Shoulder shrug normal.  

Muscle tone is normal.  Muscle strength is 5.  Deep tendon reflexes are 2+ all 

around.  Plantar reflex is with flexion response bilaterally.  Finger-to-nose 

test performance is accurate. Alternative movements are accurate.Gait not 

tested.  Sensory exam shows no deficits. No cerebellar signs are elicited.





Vitals


VITALS





Vital Signs








  Date Time  Temp Pulse Resp B/P (MAP) Pulse Ox O2 Delivery O2 Flow Rate FiO2


 


5/18/17 14:37  88  194/90 (124) 100 Nasal Cannula 2.0 


 


5/18/17 12:47   24     


 


5/18/17 10:21 97.5       





 97.5       











Labs


Labs





Laboratory Tests








Test


  5/18/17


10:43 5/18/17


11:25 5/18/17


13:55


 


White Blood Count


  4.6 x10^3/uL


(4.0-11.0) 


  


 


 


Red Blood Count


  2.61 x10^6/uL


(3.50-5.40) 


  


 


 


Hemoglobin


  8.3 g/dL


(12.0-15.5) 


  


 


 


Hematocrit


  26.4 %


(36.0-47.0) 


  


 


 


Mean Corpuscular Volume


  101 fL


() 


  


 


 


Mean Corpuscular Hemoglobin 32 pg (25-35)   


 


Mean Corpuscular Hemoglobin


Concent 31 g/dL


(31-37) 


  


 


 


Red Cell Distribution Width


  16.9 %


(11.5-14.5) 


  


 


 


Platelet Count


  300 x10^3/uL


(140-400) 


  


 


 


Neutrophils (%) (Auto) 59 % (31-73)   


 


Lymphocytes (%) (Auto) 21 % (24-48)   


 


Monocytes (%) (Auto) 14 % (0-9)   


 


Eosinophils (%) (Auto) 5 % (0-3)   


 


Basophils (%) (Auto) 1 % (0-3)   


 


Neutrophils # (Auto)


  2.7 x10^3uL


(1.8-7.7) 


  


 


 


Lymphocytes # (Auto)


  1.0 x10^3/uL


(1.0-4.8) 


  


 


 


Monocytes # (Auto)


  0.6 x10^3/uL


(0.0-1.1) 


  


 


 


Eosinophils # (Auto)


  0.2 x10^3/uL


(0.0-0.7) 


  


 


 


Basophils # (Auto)


  0.0 x10^3/uL


(0.0-0.2) 


  


 


 


Prothrombin Time


  13.7 SEC


(11.7-14.0) 


  


 


 


Prothromb Time International


Ratio 1.1 (0.8-1.1) 


  


  


 


 


Sodium Level


  


  147 mmol/L


(136-145) 


 


 


Potassium Level


  


  4.3 mmol/L


(3.5-5.1) 


 


 


Chloride Level


  


  111 mmol/L


() 


 


 


Carbon Dioxide Level


  


  26 mmol/L


(21-32) 


 


 


Anion Gap  10 (6-14)  


 


Blood Urea Nitrogen


  


  31 mg/dL


(7-20) 


 


 


Creatinine


  


  5.4 mg/dL


(0.6-1.0) 


 


 


Estimated GFR


(Cockcroft-Gault) 


  10.0 


  


 


 


Glucose Level


  


  105 mg/dL


(70-99) 


 


 


Calcium Level


  


  9.4 mg/dL


(8.5-10.1) 


 


 


Magnesium Level


  


  2.0 mg/dL


(1.8-2.4) 


 


 


Total Bilirubin


  


  0.6 mg/dL


(0.2-1.0) 


 


 


Direct Bilirubin


  


  0.2 mg/dL


(0.0-0.2) 


 


 


Aspartate Amino Transf


(AST/SGOT) 


  99 U/L (15-37) 


  


 


 


Alanine Aminotransferase


(ALT/SGPT) 


  98 U/L (14-59) 


  


 


 


Alkaline Phosphatase


  


  91 U/L


() 


 


 


Creatine Kinase


  


  61 U/L


() 


 


 


Creatine Kinase MB (Mass)


  


  1.2 ng/mL


(0.0-3.6) 


 


 


Creatine Kinase MB Relative


Index 


   % (0-4) 


  


 


 


Troponin I Quantitative


  


  0.034 ng/mL


(0.000-0.055) 


 


 


NT-Pro-B-Type Natriuretic


Peptide 


  > 63013 pg/mL


(0-124) 


 


 


Total Protein


  


  6.9 g/dL


(6.4-8.2) 


 


 


Albumin


  


  2.9 g/dL


(3.4-5.0) 


 


 


Lipase


  


  121 U/L


() 


 


 


Thyroid Stimulating Hormone


(TSH) 


  1.937 uIU/mL


(0.358-3.74) 


 


 


Urine Collection Type   Unknown 


 


Urine Color   Yellow 


 


Urine Clarity   Clear 


 


Urine pH   7.5 


 


Urine Specific Gravity   1.010 


 


Urine Protein


  


  


  100 mg/dL


(NEG-TRACE)


 


Urine Glucose (UA)


  


  


  Negative mg/dL


(NEG)


 


Urine Ketones (Stick)


  


  


  Negative mg/dL


(NEG)


 


Urine Blood   Small (NEG) 


 


Urine Nitrite   Negative (NEG) 


 


Urine Bilirubin   Negative (NEG) 


 


Urine Urobilinogen Dipstick


  


  


  0.2 mg/dL (0.2


mg/dL)


 


Urine Leukocyte Esterase   Moderate (NEG) 


 


Urine RBC   1-2 /HPF (0-2) 


 


Urine WBC


  


  


  20-40 /HPF


(0-4)


 


Urine Squamous Epithelial


Cells 


  


  Many /LPF 


 


 


Urine Bacteria


  


  


  Moderate /HPF


(0-FEW)


 


Urine Opiates Screen   Neg (NEG) 


 


Urine Methadone Screen   Neg (NEG) 


 


Urine Barbiturates   Neg (NEG) 


 


Urine Phencyclidine Screen   Neg (NEG) 


 


Urine


Amphetamine/Methamphetamine 


  


  Neg (NEG) 


 


 


Urine Benzodiazepines Screen   Neg (NEG) 


 


Urine Cocaine Screen   Neg (NEG) 


 


Urine Cannabinoids Screen   Neg (NEG) 


 


Urine Ethyl Alcohol   Neg (NEG) 








Laboratory Tests








Test


  5/18/17


10:43 5/18/17


11:25 5/18/17


13:55


 


White Blood Count


  4.6 x10^3/uL


(4.0-11.0) 


  


 


 


Red Blood Count


  2.61 x10^6/uL


(3.50-5.40) 


  


 


 


Hemoglobin


  8.3 g/dL


(12.0-15.5) 


  


 


 


Hematocrit


  26.4 %


(36.0-47.0) 


  


 


 


Mean Corpuscular Volume


  101 fL


() 


  


 


 


Mean Corpuscular Hemoglobin 32 pg (25-35)   


 


Mean Corpuscular Hemoglobin


Concent 31 g/dL


(31-37) 


  


 


 


Red Cell Distribution Width


  16.9 %


(11.5-14.5) 


  


 


 


Platelet Count


  300 x10^3/uL


(140-400) 


  


 


 


Neutrophils (%) (Auto) 59 % (31-73)   


 


Lymphocytes (%) (Auto) 21 % (24-48)   


 


Monocytes (%) (Auto) 14 % (0-9)   


 


Eosinophils (%) (Auto) 5 % (0-3)   


 


Basophils (%) (Auto) 1 % (0-3)   


 


Neutrophils # (Auto)


  2.7 x10^3uL


(1.8-7.7) 


  


 


 


Lymphocytes # (Auto)


  1.0 x10^3/uL


(1.0-4.8) 


  


 


 


Monocytes # (Auto)


  0.6 x10^3/uL


(0.0-1.1) 


  


 


 


Eosinophils # (Auto)


  0.2 x10^3/uL


(0.0-0.7) 


  


 


 


Basophils # (Auto)


  0.0 x10^3/uL


(0.0-0.2) 


  


 


 


Prothrombin Time


  13.7 SEC


(11.7-14.0) 


  


 


 


Prothromb Time International


Ratio 1.1 (0.8-1.1) 


  


  


 


 


Sodium Level


  


  147 mmol/L


(136-145) 


 


 


Potassium Level


  


  4.3 mmol/L


(3.5-5.1) 


 


 


Chloride Level


  


  111 mmol/L


() 


 


 


Carbon Dioxide Level


  


  26 mmol/L


(21-32) 


 


 


Anion Gap  10 (6-14)  


 


Blood Urea Nitrogen


  


  31 mg/dL


(7-20) 


 


 


Creatinine


  


  5.4 mg/dL


(0.6-1.0) 


 


 


Estimated GFR


(Cockcroft-Gault) 


  10.0 


  


 


 


Glucose Level


  


  105 mg/dL


(70-99) 


 


 


Calcium Level


  


  9.4 mg/dL


(8.5-10.1) 


 


 


Magnesium Level


  


  2.0 mg/dL


(1.8-2.4) 


 


 


Total Bilirubin


  


  0.6 mg/dL


(0.2-1.0) 


 


 


Direct Bilirubin


  


  0.2 mg/dL


(0.0-0.2) 


 


 


Aspartate Amino Transf


(AST/SGOT) 


  99 U/L (15-37) 


  


 


 


Alanine Aminotransferase


(ALT/SGPT) 


  98 U/L (14-59) 


  


 


 


Alkaline Phosphatase


  


  91 U/L


() 


 


 


Creatine Kinase


  


  61 U/L


() 


 


 


Creatine Kinase MB (Mass)


  


  1.2 ng/mL


(0.0-3.6) 


 


 


Creatine Kinase MB Relative


Index 


   % (0-4) 


  


 


 


Troponin I Quantitative


  


  0.034 ng/mL


(0.000-0.055) 


 


 


NT-Pro-B-Type Natriuretic


Peptide 


  > 63345 pg/mL


(0-124) 


 


 


Total Protein


  


  6.9 g/dL


(6.4-8.2) 


 


 


Albumin


  


  2.9 g/dL


(3.4-5.0) 


 


 


Lipase


  


  121 U/L


() 


 


 


Thyroid Stimulating Hormone


(TSH) 


  1.937 uIU/mL


(0.358-3.74) 


 


 


Urine Collection Type   Unknown 


 


Urine Color   Yellow 


 


Urine Clarity   Clear 


 


Urine pH   7.5 


 


Urine Specific Gravity   1.010 


 


Urine Protein


  


  


  100 mg/dL


(NEG-TRACE)


 


Urine Glucose (UA)


  


  


  Negative mg/dL


(NEG)


 


Urine Ketones (Stick)


  


  


  Negative mg/dL


(NEG)


 


Urine Blood   Small (NEG) 


 


Urine Nitrite   Negative (NEG) 


 


Urine Bilirubin   Negative (NEG) 


 


Urine Urobilinogen Dipstick


  


  


  0.2 mg/dL (0.2


mg/dL)


 


Urine Leukocyte Esterase   Moderate (NEG) 


 


Urine RBC   1-2 /HPF (0-2) 


 


Urine WBC


  


  


  20-40 /HPF


(0-4)


 


Urine Squamous Epithelial


Cells 


  


  Many /LPF 


 


 


Urine Bacteria


  


  


  Moderate /HPF


(0-FEW)


 


Urine Opiates Screen   Neg (NEG) 


 


Urine Methadone Screen   Neg (NEG) 


 


Urine Barbiturates   Neg (NEG) 


 


Urine Phencyclidine Screen   Neg (NEG) 


 


Urine


Amphetamine/Methamphetamine 


  


  Neg (NEG) 


 


 


Urine Benzodiazepines Screen   Neg (NEG) 


 


Urine Cocaine Screen   Neg (NEG) 


 


Urine Cannabinoids Screen   Neg (NEG) 


 


Urine Ethyl Alcohol   Neg (NEG) 











Images


Images


CT head negative, radiology interpretation pending.





Assessment/Plan


Assessment/Plan


Impression:


Hypertensive encephalopathy





Recommendation:


Continue blood pressure treatment


No additional neuro workup required.





Thank you for letting me help with the patient's care.











UMM FERNANDEZ MD May 18, 2017 16:12 (M6) obeys commands

## 2018-01-16 ENCOUNTER — HOSPITAL ENCOUNTER (INPATIENT)
Dept: HOSPITAL 61 - ENDOS | Age: 53
LOS: 15 days | Discharge: NURSING, REHAB OR LONG-TERM CARE | DRG: 207 | End: 2018-01-31
Attending: INTERNAL MEDICINE | Admitting: INTERNAL MEDICINE
Payer: MEDICARE

## 2018-01-16 DIAGNOSIS — I25.10: ICD-10-CM

## 2018-01-16 DIAGNOSIS — J96.21: Primary | ICD-10-CM

## 2018-01-16 DIAGNOSIS — G62.9: ICD-10-CM

## 2018-01-16 DIAGNOSIS — K21.9: ICD-10-CM

## 2018-01-16 DIAGNOSIS — E44.1: ICD-10-CM

## 2018-01-16 DIAGNOSIS — G93.41: ICD-10-CM

## 2018-01-16 DIAGNOSIS — I35.1: ICD-10-CM

## 2018-01-16 DIAGNOSIS — D72.829: ICD-10-CM

## 2018-01-16 DIAGNOSIS — M06.9: ICD-10-CM

## 2018-01-16 DIAGNOSIS — I47.1: ICD-10-CM

## 2018-01-16 DIAGNOSIS — Z82.49: ICD-10-CM

## 2018-01-16 DIAGNOSIS — I16.9: ICD-10-CM

## 2018-01-16 DIAGNOSIS — F32.9: ICD-10-CM

## 2018-01-16 DIAGNOSIS — F41.9: ICD-10-CM

## 2018-01-16 DIAGNOSIS — Z51.5: ICD-10-CM

## 2018-01-16 DIAGNOSIS — G40.919: ICD-10-CM

## 2018-01-16 DIAGNOSIS — M17.10: ICD-10-CM

## 2018-01-16 DIAGNOSIS — Z90.710: ICD-10-CM

## 2018-01-16 DIAGNOSIS — E78.5: ICD-10-CM

## 2018-01-16 DIAGNOSIS — I50.33: ICD-10-CM

## 2018-01-16 DIAGNOSIS — I27.21: ICD-10-CM

## 2018-01-16 DIAGNOSIS — K59.00: ICD-10-CM

## 2018-01-16 DIAGNOSIS — G93.1: ICD-10-CM

## 2018-01-16 DIAGNOSIS — Z99.2: ICD-10-CM

## 2018-01-16 DIAGNOSIS — K92.2: ICD-10-CM

## 2018-01-16 DIAGNOSIS — R04.0: ICD-10-CM

## 2018-01-16 DIAGNOSIS — I13.2: ICD-10-CM

## 2018-01-16 DIAGNOSIS — I46.9: ICD-10-CM

## 2018-01-16 DIAGNOSIS — N18.6: ICD-10-CM

## 2018-01-16 DIAGNOSIS — D62: ICD-10-CM

## 2018-01-16 LAB
ALBUMIN SERPL-MCNC: 3.1 G/DL (ref 3.4–5)
ALBUMIN/GLOB SERPL: 0.8 {RATIO} (ref 1–1.7)
ALP SERPL-CCNC: 141 U/L (ref 46–116)
ALT (SGPT): 153 U/L (ref 14–59)
AMPHETAMINE/METHAMPHETAMINE: (no result)
ANION GAP SERPL CALC-SCNC: 14 MMOL/L (ref 6–14)
AST SERPL-CCNC: 184 U/L (ref 15–37)
BARBITURATES: (no result)
BENZODIAZEPINES: (no result)
BLOOD UREA NITROGEN: 26 MG/DL (ref 7–20)
BUN/CREAT SERPL: 5 (ref 6–20)
CALCIUM: 8.1 MG/DL (ref 8.5–10.1)
CANNABINOIDS: (no result)
CHLORIDE: 103 MMOL/L (ref 98–107)
CK SERPL-CCNC: 101 U/L (ref 26–192)
CO2 SERPL-SCNC: 29 MMOL/L (ref 21–32)
COCAINE: (no result)
CREAT SERPL-MCNC: 5.5 MG/DL (ref 0.6–1)
ETHANOL, URINE: (no result)
GFR SERPLBLD BASED ON 1.73 SQ M-ARVRAT: 9.8 ML/MIN
GLOBULIN SER-MCNC: 4 G/DL (ref 2.2–3.8)
GLUCOSE SERPL-MCNC: 145 MG/DL (ref 70–99)
HCG SERPL-ACNC: 5.1 X10^3/UL (ref 4–11)
HEMATOCRIT: 38.7 % (ref 36–47)
HEMOGLOBIN: 12.3 G/DL (ref 12–15.5)
INR: 1.2 (ref 0.8–1.1)
IONIZED CALCIUM: 1.05 MMOL/L (ref 1.13–1.32)
MAGNESIUM: 1.9 MG/DL (ref 1.8–2.4)
MEAN CORPUSCULAR HEMOGLOBIN: 30 PG (ref 25–35)
MEAN CORPUSCULAR HGB CONC: 32 G/DL (ref 31–37)
MEAN CORPUSCULAR VOLUME: 94 FL (ref 79–100)
METHADONE: (no result)
OPIATES: (no result)
PHENCYCLIDINE: (no result)
PLATELET COUNT: 280 X10^3/UL (ref 140–400)
POC GLUCOSE: 138 MG/DL (ref 70–99)
POTASSIUM SERPL-SCNC: 4 MMOL/L (ref 3.5–5.1)
PROTHROMBIN TIME PATIENT: 14.6 SEC (ref 11.7–14)
RED BLOOD COUNT: 4.11 X10^6/UL (ref 3.5–5.4)
RED CELL DISTRIBUTION WIDTH: 15.1 % (ref 11.5–14.5)
SODIUM: 146 MMOL/L (ref 136–145)
THYROID STIM HORMONE (TSH): 3.31 UIU/ML (ref 0.36–3.74)
TOTAL BILIRUBIN: 0.7 MG/DL (ref 0.2–1)
TOTAL PROTEIN: 7.1 G/DL (ref 6.4–8.2)
TROPONINI: 0.23 NG/ML (ref 0–0.06)
TROPONINI: 0.41 NG/ML (ref 0–0.06)
VITAMIN-B12: 1360 PG/ML (ref 247–911)

## 2018-01-16 PROCEDURE — 09JY8ZZ INSPECTION OF SINUS, VIA NATURAL OR ARTIFICIAL OPENING ENDOSCOPIC: ICD-10-PCS

## 2018-01-16 PROCEDURE — 80307 DRUG TEST PRSMV CHEM ANLYZR: CPT

## 2018-01-16 PROCEDURE — 80053 COMPREHEN METABOLIC PANEL: CPT

## 2018-01-16 PROCEDURE — 93306 TTE W/DOPPLER COMPLETE: CPT

## 2018-01-16 PROCEDURE — 94640 AIRWAY INHALATION TREATMENT: CPT

## 2018-01-16 PROCEDURE — 5A1D70Z PERFORMANCE OF URINARY FILTRATION, INTERMITTENT, LESS THAN 6 HOURS PER DAY: ICD-10-PCS

## 2018-01-16 PROCEDURE — 02HV33Z INSERTION OF INFUSION DEVICE INTO SUPERIOR VENA CAVA, PERCUTANEOUS APPROACH: ICD-10-PCS

## 2018-01-16 PROCEDURE — 92611 MOTION FLUOROSCOPY/SWALLOW: CPT

## 2018-01-16 PROCEDURE — 82607 VITAMIN B-12: CPT

## 2018-01-16 PROCEDURE — 5A09357 ASSISTANCE WITH RESPIRATORY VENTILATION, LESS THAN 24 CONSECUTIVE HOURS, CONTINUOUS POSITIVE AIRWAY PRESSURE: ICD-10-PCS

## 2018-01-16 PROCEDURE — 5A1955Z RESPIRATORY VENTILATION, GREATER THAN 96 CONSECUTIVE HOURS: ICD-10-PCS

## 2018-01-16 PROCEDURE — 82310 ASSAY OF CALCIUM: CPT

## 2018-01-16 PROCEDURE — 82550 ASSAY OF CK (CPK): CPT

## 2018-01-16 PROCEDURE — 70450 CT HEAD/BRAIN W/O DYE: CPT

## 2018-01-16 PROCEDURE — 93308 TTE F-UP OR LMTD: CPT

## 2018-01-16 PROCEDURE — 0DJ08ZZ INSPECTION OF UPPER INTESTINAL TRACT, VIA NATURAL OR ARTIFICIAL OPENING ENDOSCOPIC: ICD-10-PCS

## 2018-01-16 PROCEDURE — 80069 RENAL FUNCTION PANEL: CPT

## 2018-01-16 PROCEDURE — 84484 ASSAY OF TROPONIN QUANT: CPT

## 2018-01-16 PROCEDURE — 94760 N-INVAS EAR/PLS OXIMETRY 1: CPT

## 2018-01-16 PROCEDURE — 5A12012 PERFORMANCE OF CARDIAC OUTPUT, SINGLE, MANUAL: ICD-10-PCS

## 2018-01-16 PROCEDURE — 2Y41X5Z PACKING OF NASAL REGION USING PACKING MATERIAL: ICD-10-PCS

## 2018-01-16 PROCEDURE — 83735 ASSAY OF MAGNESIUM: CPT

## 2018-01-16 PROCEDURE — 36415 COLL VENOUS BLD VENIPUNCTURE: CPT

## 2018-01-16 PROCEDURE — 85027 COMPLETE CBC AUTOMATED: CPT

## 2018-01-16 PROCEDURE — 87641 MR-STAPH DNA AMP PROBE: CPT

## 2018-01-16 PROCEDURE — 87324 CLOSTRIDIUM AG IA: CPT

## 2018-01-16 PROCEDURE — 92610 EVALUATE SWALLOWING FUNCTION: CPT

## 2018-01-16 PROCEDURE — 87040 BLOOD CULTURE FOR BACTERIA: CPT

## 2018-01-16 PROCEDURE — 85610 PROTHROMBIN TIME: CPT

## 2018-01-16 PROCEDURE — 95816 EEG AWAKE AND DROWSY: CPT

## 2018-01-16 PROCEDURE — 84100 ASSAY OF PHOSPHORUS: CPT

## 2018-01-16 PROCEDURE — 85025 COMPLETE CBC W/AUTO DIFF WBC: CPT

## 2018-01-16 PROCEDURE — 94660 CPAP INITIATION&MGMT: CPT

## 2018-01-16 PROCEDURE — 85007 BL SMEAR W/DIFF WBC COUNT: CPT

## 2018-01-16 PROCEDURE — 36569 INSJ PICC 5 YR+ W/O IMAGING: CPT

## 2018-01-16 PROCEDURE — 94003 VENT MGMT INPAT SUBQ DAY: CPT

## 2018-01-16 PROCEDURE — 71045 X-RAY EXAM CHEST 1 VIEW: CPT

## 2018-01-16 PROCEDURE — 80048 BASIC METABOLIC PNL TOTAL CA: CPT

## 2018-01-16 PROCEDURE — 84443 ASSAY THYROID STIM HORMONE: CPT

## 2018-01-16 PROCEDURE — 74230 X-RAY XM SWLNG FUNCJ C+: CPT

## 2018-01-16 PROCEDURE — 82805 BLOOD GASES W/O2 SATURATION: CPT

## 2018-01-16 PROCEDURE — 70551 MRI BRAIN STEM W/O DYE: CPT

## 2018-01-16 PROCEDURE — 82962 GLUCOSE BLOOD TEST: CPT

## 2018-01-16 PROCEDURE — 0BH17EZ INSERTION OF ENDOTRACHEAL AIRWAY INTO TRACHEA, VIA NATURAL OR ARTIFICIAL OPENING: ICD-10-PCS

## 2018-01-16 PROCEDURE — 94002 VENT MGMT INPAT INIT DAY: CPT

## 2018-01-16 PROCEDURE — 0DJD8ZZ INSPECTION OF LOWER INTESTINAL TRACT, VIA NATURAL OR ARTIFICIAL OPENING ENDOSCOPIC: ICD-10-PCS

## 2018-01-16 PROCEDURE — 93005 ELECTROCARDIOGRAM TRACING: CPT

## 2018-01-16 PROCEDURE — 92526 ORAL FUNCTION THERAPY: CPT

## 2018-01-16 PROCEDURE — 36600 WITHDRAWAL OF ARTERIAL BLOOD: CPT

## 2018-01-16 RX ADMIN — LEVETIRACETAM 1 MLS/HR: 100 INJECTION, SOLUTION, CONCENTRATE INTRAVENOUS at 13:00

## 2018-01-16 RX ADMIN — MIDAZOLAM HYDROCHLORIDE 1 MG: 1 INJECTION, SOLUTION INTRAMUSCULAR; INTRAVENOUS at 20:55

## 2018-01-16 RX ADMIN — FENTANYL CITRATE 1 MCG: 50 INJECTION INTRAMUSCULAR; INTRAVENOUS at 20:50

## 2018-01-16 RX ADMIN — BACITRACIN 1 MLS/HR: 5000 INJECTION, POWDER, FOR SOLUTION INTRAMUSCULAR at 09:37

## 2018-01-16 RX ADMIN — GLYCERIN, ISOLEUCINE, LEUCINE, LYSINE, METHIONINE, PHENYLALANINE, THREONINE, TRYPTOPHAN, VALINE, ALANINE, GLYCINE, ARGININE, HISTIDINE, PROLINE, SERINE, CYSTEINE, SODIUM ACETATE, MAGNESIUM ACETATE, CALCIUM ACETATE, SODIUM CHLORIDE, POTASSIUM CHLORIDE, PHOSPHORIC ACID, AND POTASSIUM METABISULFITE 1 MLS/HR
3; .21; .27; .22; .16; .17; .12; .046; .2; .21; .42; .29; .085; .34; .18; .014; .2; .054; .026; .12; .15; .041 INJECTION INTRAVENOUS at 22:16

## 2018-01-16 RX ADMIN — SODIUM CHLORIDE, SODIUM LACTATE, POTASSIUM CHLORIDE, AND CALCIUM CHLORIDE 1 MLS/HR: .6; .31; .03; .02 INJECTION, SOLUTION INTRAVENOUS at 07:00

## 2018-01-16 RX ADMIN — LACOSAMIDE 1 MLS/HR: 10 INJECTION INTRAVENOUS at 18:34

## 2018-01-16 RX ADMIN — METOPROLOL TARTRATE 1 MG: 5 INJECTION INTRAVENOUS at 13:30

## 2018-01-16 RX ADMIN — CHLORHEXIDINE GLUCONATE 1 ML: 1.2 RINSE ORAL at 21:00

## 2018-01-17 LAB
ADD MAN DIFF?: NO
ANION GAP SERPL CALC-SCNC: 13 MMOL/L (ref 6–14)
BASO #: 0 X10^3/UL (ref 0–0.2)
BASO %: 0 % (ref 0–3)
BLOOD UREA NITROGEN: 38 MG/DL (ref 7–20)
CALCIUM: 8.2 MG/DL (ref 8.5–10.1)
CHLORIDE: 105 MMOL/L (ref 98–107)
CO2 SERPL-SCNC: 25 MMOL/L (ref 21–32)
CREAT SERPL-MCNC: 6.6 MG/DL (ref 0.6–1)
EOS #: 0 X10^3/UL (ref 0–0.7)
EOS %: 0 % (ref 0–3)
GFR SERPLBLD BASED ON 1.73 SQ M-ARVRAT: 7.9 ML/MIN
GLUCOSE SERPL-MCNC: 110 MG/DL (ref 70–99)
HCG SERPL-ACNC: 6.8 X10^3/UL (ref 4–11)
HEMATOCRIT: 37.9 % (ref 36–47)
HEMOGLOBIN: 12.3 G/DL (ref 12–15.5)
LYMPH #: 0.5 X10^3/UL (ref 1–4.8)
LYMPH %: 8 % (ref 24–48)
MEAN CORPUSCULAR HEMOGLOBIN: 30 PG (ref 25–35)
MEAN CORPUSCULAR HGB CONC: 32 G/DL (ref 31–37)
MEAN CORPUSCULAR VOLUME: 94 FL (ref 79–100)
MONO #: 0.9 X10^3/UL (ref 0–1.1)
MONO %: 13 % (ref 0–9)
NEUT #: 5.4 X10^3UL (ref 1.8–7.7)
NEUT %: 79 % (ref 31–73)
PLATELET COUNT: 270 X10^3/UL (ref 140–400)
POTASSIUM SERPL-SCNC: 4.1 MMOL/L (ref 3.5–5.1)
RED BLOOD COUNT: 4.04 X10^6/UL (ref 3.5–5.4)
RED CELL DISTRIBUTION WIDTH: 14.9 % (ref 11.5–14.5)
SODIUM: 143 MMOL/L (ref 136–145)
TROPONINI: 0.34 NG/ML (ref 0–0.06)

## 2018-01-17 RX ADMIN — LACOSAMIDE 1 MLS/HR: 10 INJECTION INTRAVENOUS at 07:45

## 2018-01-17 RX ADMIN — PANTOPRAZOLE SODIUM 1 MG: 40 INJECTION, POWDER, FOR SOLUTION INTRAVENOUS at 11:28

## 2018-01-17 RX ADMIN — ASPIRIN 1 MG: 300 SUPPOSITORY RECTAL at 07:40

## 2018-01-17 RX ADMIN — LISINOPRIL 1 MG: 20 TABLET ORAL at 20:44

## 2018-01-17 RX ADMIN — FENTANYL CITRATE 1 MCG: 50 INJECTION INTRAMUSCULAR; INTRAVENOUS at 05:34

## 2018-01-17 RX ADMIN — PIPERACILLIN SODIUM AND TAZOBACTAM SODIUM 1 GM: .25; 2 INJECTION, POWDER, LYOPHILIZED, FOR SOLUTION INTRAVENOUS at 11:33

## 2018-01-17 RX ADMIN — LABETALOL HCL 1 MG: 200 TABLET, FILM COATED ORAL at 15:56

## 2018-01-17 RX ADMIN — LACOSAMIDE 1 MLS/HR: 10 INJECTION INTRAVENOUS at 20:33

## 2018-01-17 RX ADMIN — METOPROLOL TARTRATE 1 MG: 5 INJECTION INTRAVENOUS at 05:42

## 2018-01-17 RX ADMIN — PIPERACILLIN SODIUM AND TAZOBACTAM SODIUM 1 MLS/HR: 2; .25 INJECTION, POWDER, LYOPHILIZED, FOR SOLUTION INTRAVENOUS at 22:10

## 2018-01-17 RX ADMIN — HEPARIN SODIUM 1 UNIT: 5000 INJECTION, SOLUTION INTRAVENOUS; SUBCUTANEOUS at 20:43

## 2018-01-17 RX ADMIN — GLYCERIN, ISOLEUCINE, LEUCINE, LYSINE, METHIONINE, PHENYLALANINE, THREONINE, TRYPTOPHAN, VALINE, ALANINE, GLYCINE, ARGININE, HISTIDINE, PROLINE, SERINE, CYSTEINE, SODIUM ACETATE, MAGNESIUM ACETATE, CALCIUM ACETATE, SODIUM CHLORIDE, POTASSIUM CHLORIDE, PHOSPHORIC ACID, AND POTASSIUM METABISULFITE 1 MLS/HR
3; .21; .27; .22; .16; .17; .12; .046; .2; .21; .42; .29; .085; .34; .18; .014; .2; .054; .026; .12; .15; .041 INJECTION INTRAVENOUS at 11:34

## 2018-01-17 RX ADMIN — CHLORHEXIDINE GLUCONATE 1 ML: 1.2 RINSE ORAL at 21:00

## 2018-01-17 RX ADMIN — HEPARIN SODIUM 1 UNIT: 5000 INJECTION, SOLUTION INTRAVENOUS; SUBCUTANEOUS at 11:29

## 2018-01-17 RX ADMIN — FENTANYL CITRATE 1 MCG: 50 INJECTION INTRAMUSCULAR; INTRAVENOUS at 03:56

## 2018-01-17 RX ADMIN — CHLORHEXIDINE GLUCONATE 1 ML: 1.2 RINSE ORAL at 07:41

## 2018-01-17 RX ADMIN — ISOSORBIDE DINITRATE 1 MG: 10 TABLET ORAL at 20:43

## 2018-01-18 LAB
ADD MAN DIFF?: NO
ALBUMIN SERPL-MCNC: 2.6 G/DL (ref 3.4–5)
ALBUMIN/GLOB SERPL: 0.6 {RATIO} (ref 1–1.7)
ALP SERPL-CCNC: 103 U/L (ref 46–116)
ALT (SGPT): 77 U/L (ref 14–59)
ANION GAP SERPL CALC-SCNC: 11 MMOL/L (ref 6–14)
AST SERPL-CCNC: 41 U/L (ref 15–37)
BASE EXCESS ABG: 3 MMOL/L (ref -3–3)
BASO #: 0 X10^3/UL (ref 0–0.2)
BASO %: 0 % (ref 0–3)
BLOOD UREA NITROGEN: 31 MG/DL (ref 7–20)
BUN/CREAT SERPL: 6 (ref 6–20)
CALCIUM: 9 MG/DL (ref 8.5–10.1)
CHLORIDE: 102 MMOL/L (ref 98–107)
CO2 SERPL-SCNC: 28 MMOL/L (ref 21–32)
CREAT SERPL-MCNC: 5.2 MG/DL (ref 0.6–1)
EOS #: 0.1 X10^3/UL (ref 0–0.7)
EOS %: 1 % (ref 0–3)
FIO2 ABG: 40
GFR SERPLBLD BASED ON 1.73 SQ M-ARVRAT: 10.4 ML/MIN
GLOBULIN SER-MCNC: 4.4 G/DL (ref 2.2–3.8)
GLUCOSE SERPL-MCNC: 121 MG/DL (ref 70–99)
HCG SERPL-ACNC: 7.6 X10^3/UL (ref 4–11)
HCO3 ABG: 26 MMOL/L (ref 21–28)
HEMATOCRIT: 34.6 % (ref 36–47)
HEMOGLOBIN: 11.2 G/DL (ref 12–15.5)
LYMPH #: 0.8 X10^3/UL (ref 1–4.8)
LYMPH %: 10 % (ref 24–48)
MEAN CORPUSCULAR HEMOGLOBIN: 31 PG (ref 25–35)
MEAN CORPUSCULAR HGB CONC: 33 G/DL (ref 31–37)
MEAN CORPUSCULAR VOLUME: 94 FL (ref 79–100)
MONO #: 1 X10^3/UL (ref 0–1.1)
MONO %: 14 % (ref 0–9)
NEUT #: 5.7 X10^3UL (ref 1.8–7.7)
NEUT %: 75 % (ref 31–73)
PCO2 ABG: 37 MMHG (ref 35–46)
PH ABG: 7.47 (ref 7.35–7.45)
PLATELET COUNT: 231 X10^3/UL (ref 140–400)
PO2 ABG: 93 MMHG (ref 75–108)
POC GLUCOSE: 126 MG/DL (ref 70–99)
POTASSIUM SERPL-SCNC: 3.9 MMOL/L (ref 3.5–5.1)
RED BLOOD COUNT: 3.67 X10^6/UL (ref 3.5–5.4)
RED CELL DISTRIBUTION WIDTH: 15.2 % (ref 11.5–14.5)
SAT O2 ABG: 96 % (ref 92–99)
SODIUM: 141 MMOL/L (ref 136–145)
TOTAL BILIRUBIN: 0.7 MG/DL (ref 0.2–1)
TOTAL PROTEIN: 7 G/DL (ref 6.4–8.2)

## 2018-01-18 RX ADMIN — ASPIRIN 1 MG: 300 SUPPOSITORY RECTAL at 09:27

## 2018-01-18 RX ADMIN — LISINOPRIL 1 MG: 20 TABLET ORAL at 20:38

## 2018-01-18 RX ADMIN — CHLORHEXIDINE GLUCONATE 1 ML: 1.2 RINSE ORAL at 20:38

## 2018-01-18 RX ADMIN — LABETALOL HCL 1 MG: 200 TABLET, FILM COATED ORAL at 20:38

## 2018-01-18 RX ADMIN — LABETALOL HCL 1 MG: 200 TABLET, FILM COATED ORAL at 09:28

## 2018-01-18 RX ADMIN — PANTOPRAZOLE SODIUM 1 MG: 40 INJECTION, POWDER, FOR SOLUTION INTRAVENOUS at 09:30

## 2018-01-18 RX ADMIN — METOPROLOL TARTRATE 1 MG: 5 INJECTION INTRAVENOUS at 02:05

## 2018-01-18 RX ADMIN — HEPARIN SODIUM 1 UNIT: 5000 INJECTION, SOLUTION INTRAVENOUS; SUBCUTANEOUS at 09:46

## 2018-01-18 RX ADMIN — HEPARIN SODIUM 1 UNIT: 5000 INJECTION, SOLUTION INTRAVENOUS; SUBCUTANEOUS at 20:37

## 2018-01-18 RX ADMIN — CHLORHEXIDINE GLUCONATE 1 ML: 1.2 RINSE ORAL at 09:27

## 2018-01-18 RX ADMIN — PIPERACILLIN SODIUM AND TAZOBACTAM SODIUM 1 MLS/HR: 2; .25 INJECTION, POWDER, LYOPHILIZED, FOR SOLUTION INTRAVENOUS at 15:22

## 2018-01-18 RX ADMIN — PIPERACILLIN SODIUM AND TAZOBACTAM SODIUM 1 MLS/HR: 2; .25 INJECTION, POWDER, LYOPHILIZED, FOR SOLUTION INTRAVENOUS at 05:35

## 2018-01-18 RX ADMIN — PIPERACILLIN SODIUM AND TAZOBACTAM SODIUM 1 MLS/HR: 2; .25 INJECTION, POWDER, LYOPHILIZED, FOR SOLUTION INTRAVENOUS at 21:38

## 2018-01-18 RX ADMIN — ISOSORBIDE DINITRATE 1 MG: 10 TABLET ORAL at 20:39

## 2018-01-18 RX ADMIN — LACOSAMIDE 1 MLS/HR: 10 INJECTION INTRAVENOUS at 09:32

## 2018-01-18 RX ADMIN — ISOSORBIDE DINITRATE 1 MG: 10 TABLET ORAL at 09:29

## 2018-01-18 RX ADMIN — ISOSORBIDE DINITRATE 1 MG: 10 TABLET ORAL at 15:23

## 2018-01-18 RX ADMIN — LACOSAMIDE 1 MLS/HR: 10 INJECTION INTRAVENOUS at 20:57

## 2018-01-19 LAB
ADD MAN DIFF?: NO
ALBUMIN SERPL-MCNC: 2.5 G/DL (ref 3.4–5)
ANION GAP SERPL CALC-SCNC: 14 MMOL/L (ref 6–14)
BASE EXCESS ABG: 0 MMOL/L (ref -3–3)
BASE EXCESS ABG: 6 MMOL/L (ref -3–3)
BASO #: 0 X10^3/UL (ref 0–0.2)
BASO %: 0 % (ref 0–3)
BLOOD UREA NITROGEN: 47 MG/DL (ref 7–20)
CALCIUM: 8.5 MG/DL (ref 8.5–10.1)
CHLORIDE: 100 MMOL/L (ref 98–107)
CO2 SERPL-SCNC: 24 MMOL/L (ref 21–32)
CREAT SERPL-MCNC: 6.9 MG/DL (ref 0.6–1)
EOS #: 0.3 X10^3/UL (ref 0–0.7)
EOS %: 4 % (ref 0–3)
FIO2 ABG: 35
FIO2 ABG: 40
GFR SERPLBLD BASED ON 1.73 SQ M-ARVRAT: 7.5 ML/MIN
GLUCOSE SERPL-MCNC: 121 MG/DL (ref 70–99)
HCG SERPL-ACNC: 7.2 X10^3/UL (ref 4–11)
HCO3 ABG: 24 MMOL/L (ref 21–28)
HCO3 ABG: 28 MMOL/L (ref 21–28)
HEMATOCRIT: 31.4 % (ref 36–47)
HEMOGLOBIN: 10.1 G/DL (ref 12–15.5)
LYMPH #: 0.8 X10^3/UL (ref 1–4.8)
LYMPH %: 12 % (ref 24–48)
MAGNESIUM: 2.3 MG/DL (ref 1.8–2.4)
MEAN CORPUSCULAR HEMOGLOBIN: 30 PG (ref 25–35)
MEAN CORPUSCULAR HGB CONC: 32 G/DL (ref 31–37)
MEAN CORPUSCULAR VOLUME: 95 FL (ref 79–100)
MONO #: 1 X10^3/UL (ref 0–1.1)
MONO %: 15 % (ref 0–9)
NEUT #: 5 X10^3UL (ref 1.8–7.7)
NEUT %: 69 % (ref 31–73)
PCO2 ABG: 34 MMHG (ref 35–46)
PCO2 ABG: 35 MMHG (ref 35–46)
PH ABG: 7.46 (ref 7.35–7.45)
PH ABG: 7.53 (ref 7.35–7.45)
PHOSPHORUS: 4.8 MG/DL (ref 2.6–4.7)
PLATELET COUNT: 230 X10^3/UL (ref 140–400)
PO2 ABG: 130 MMHG (ref 75–108)
PO2 ABG: 143 MMHG (ref 75–108)
POTASSIUM SERPL-SCNC: 3.9 MMOL/L (ref 3.5–5.1)
RED BLOOD COUNT: 3.32 X10^6/UL (ref 3.5–5.4)
RED CELL DISTRIBUTION WIDTH: 15.2 % (ref 11.5–14.5)
SAT O2 ABG: 98 % (ref 92–99)
SAT O2 ABG: 99 % (ref 92–99)
SODIUM: 138 MMOL/L (ref 136–145)

## 2018-01-19 RX ADMIN — PIPERACILLIN SODIUM AND TAZOBACTAM SODIUM 1 MLS/HR: 2; .25 INJECTION, POWDER, LYOPHILIZED, FOR SOLUTION INTRAVENOUS at 23:36

## 2018-01-19 RX ADMIN — PANTOPRAZOLE SODIUM 1 MG: 40 INJECTION, POWDER, FOR SOLUTION INTRAVENOUS at 13:44

## 2018-01-19 RX ADMIN — LABETALOL HCL 1 MG: 200 TABLET, FILM COATED ORAL at 13:46

## 2018-01-19 RX ADMIN — ISOSORBIDE DINITRATE 1 MG: 10 TABLET ORAL at 09:00

## 2018-01-19 RX ADMIN — ASPIRIN 1 MG: 300 SUPPOSITORY RECTAL at 13:46

## 2018-01-19 RX ADMIN — LABETALOL HCL 1 MG: 200 TABLET, FILM COATED ORAL at 22:12

## 2018-01-19 RX ADMIN — CHLORHEXIDINE GLUCONATE 1 ML: 1.2 RINSE ORAL at 13:36

## 2018-01-19 RX ADMIN — CHLORHEXIDINE GLUCONATE 1 ML: 1.2 RINSE ORAL at 21:00

## 2018-01-19 RX ADMIN — HEPARIN SODIUM 1 UNIT: 5000 INJECTION, SOLUTION INTRAVENOUS; SUBCUTANEOUS at 13:51

## 2018-01-19 RX ADMIN — ISOSORBIDE DINITRATE 1 MG: 10 TABLET ORAL at 22:07

## 2018-01-19 RX ADMIN — METOPROLOL TARTRATE 1 MG: 5 INJECTION INTRAVENOUS at 03:47

## 2018-01-19 RX ADMIN — HEPARIN SODIUM 1 UNIT: 5000 INJECTION, SOLUTION INTRAVENOUS; SUBCUTANEOUS at 22:11

## 2018-01-19 RX ADMIN — PIPERACILLIN SODIUM AND TAZOBACTAM SODIUM 1 MLS/HR: 2; .25 INJECTION, POWDER, LYOPHILIZED, FOR SOLUTION INTRAVENOUS at 05:31

## 2018-01-19 RX ADMIN — LACOSAMIDE 1 MLS/HR: 10 INJECTION INTRAVENOUS at 20:13

## 2018-01-19 RX ADMIN — PROPOFOL 1 MLS/HR: 10 INJECTION, EMULSION INTRAVENOUS at 10:44

## 2018-01-19 RX ADMIN — ISOSORBIDE DINITRATE 1 MG: 10 TABLET ORAL at 14:13

## 2018-01-19 RX ADMIN — LACOSAMIDE 1 MLS/HR: 10 INJECTION INTRAVENOUS at 13:35

## 2018-01-19 RX ADMIN — LISINOPRIL 1 MG: 20 TABLET ORAL at 22:08

## 2018-01-19 RX ADMIN — PIPERACILLIN SODIUM AND TAZOBACTAM SODIUM 1 MLS/HR: 2; .25 INJECTION, POWDER, LYOPHILIZED, FOR SOLUTION INTRAVENOUS at 13:50

## 2018-01-20 LAB
ALBUMIN SERPL-MCNC: 2.6 G/DL (ref 3.4–5)
ANION GAP SERPL CALC-SCNC: 12 MMOL/L (ref 6–14)
BLOOD UREA NITROGEN: 31 MG/DL (ref 7–20)
CALCIUM: 8.4 MG/DL (ref 8.5–10.1)
CHLORIDE: 98 MMOL/L (ref 98–107)
CO2 SERPL-SCNC: 28 MMOL/L (ref 21–32)
CREAT SERPL-MCNC: 5.4 MG/DL (ref 0.6–1)
GFR SERPLBLD BASED ON 1.73 SQ M-ARVRAT: 10 ML/MIN
GLUCOSE SERPL-MCNC: 112 MG/DL (ref 70–99)
MAGNESIUM: 2.1 MG/DL (ref 1.8–2.4)
PHOSPHORUS: 5.3 MG/DL (ref 2.6–4.7)
POTASSIUM SERPL-SCNC: 3.7 MMOL/L (ref 3.5–5.1)
SODIUM: 138 MMOL/L (ref 136–145)

## 2018-01-20 RX ADMIN — ASPIRIN 325 MG ORAL TABLET 1 MG: 325 PILL ORAL at 09:30

## 2018-01-20 RX ADMIN — OXYMETAZOLINE HYDROCHLORIDE 1 SPRAY: 5 SPRAY NASAL at 18:00

## 2018-01-20 RX ADMIN — DARBEPOETIN ALFA 1 MCG: 60 INJECTION, SOLUTION INTRAVENOUS; SUBCUTANEOUS at 22:00

## 2018-01-20 RX ADMIN — LABETALOL HCL 1 MG: 200 TABLET, FILM COATED ORAL at 09:24

## 2018-01-20 RX ADMIN — GLYCERIN, ISOLEUCINE, LEUCINE, LYSINE, METHIONINE, PHENYLALANINE, THREONINE, TRYPTOPHAN, VALINE, ALANINE, GLYCINE, ARGININE, HISTIDINE, PROLINE, SERINE, CYSTEINE, SODIUM ACETATE, MAGNESIUM ACETATE, CALCIUM ACETATE, SODIUM CHLORIDE, POTASSIUM CHLORIDE, PHOSPHORIC ACID, AND POTASSIUM METABISULFITE 1 MLS/HR
3; .21; .27; .22; .16; .17; .12; .046; .2; .21; .42; .29; .085; .34; .18; .014; .2; .054; .026; .12; .15; .041 INJECTION INTRAVENOUS at 22:01

## 2018-01-20 RX ADMIN — OXYMETAZOLINE HYDROCHLORIDE 1 SPRAY: 5 SPRAY NASAL at 20:46

## 2018-01-20 RX ADMIN — HEPARIN SODIUM 1 UNIT: 5000 INJECTION, SOLUTION INTRAVENOUS; SUBCUTANEOUS at 09:19

## 2018-01-20 RX ADMIN — PIPERACILLIN SODIUM AND TAZOBACTAM SODIUM 1 MLS/HR: 2; .25 INJECTION, POWDER, LYOPHILIZED, FOR SOLUTION INTRAVENOUS at 21:45

## 2018-01-20 RX ADMIN — ISOSORBIDE DINITRATE 1 MG: 10 TABLET ORAL at 09:22

## 2018-01-20 RX ADMIN — LABETALOL HCL 1 MG: 200 TABLET, FILM COATED ORAL at 21:00

## 2018-01-20 RX ADMIN — CHLORHEXIDINE GLUCONATE 1 ML: 1.2 RINSE ORAL at 09:00

## 2018-01-20 RX ADMIN — HYDRALAZINE HYDROCHLORIDE 1 MG: 20 INJECTION INTRAMUSCULAR; INTRAVENOUS at 22:25

## 2018-01-20 RX ADMIN — PANTOPRAZOLE SODIUM 1 MG: 40 INJECTION, POWDER, FOR SOLUTION INTRAVENOUS at 09:18

## 2018-01-20 RX ADMIN — PIPERACILLIN SODIUM AND TAZOBACTAM SODIUM 1 MLS/HR: 2; .25 INJECTION, POWDER, LYOPHILIZED, FOR SOLUTION INTRAVENOUS at 14:26

## 2018-01-20 RX ADMIN — LACOSAMIDE 1 MLS/HR: 10 INJECTION INTRAVENOUS at 20:52

## 2018-01-20 RX ADMIN — ASPIRIN 1 MG: 300 SUPPOSITORY RECTAL at 09:00

## 2018-01-20 RX ADMIN — OXYMETAZOLINE HYDROCHLORIDE 1 SPRAY: 5 SPRAY NASAL at 22:01

## 2018-01-20 RX ADMIN — OXYMETAZOLINE HYDROCHLORIDE 1 SPRAY: 5 SPRAY NASAL at 14:27

## 2018-01-20 RX ADMIN — PIPERACILLIN SODIUM AND TAZOBACTAM SODIUM 1 MLS/HR: 2; .25 INJECTION, POWDER, LYOPHILIZED, FOR SOLUTION INTRAVENOUS at 05:22

## 2018-01-20 RX ADMIN — ISOSORBIDE DINITRATE 1 MG: 10 TABLET ORAL at 21:00

## 2018-01-20 RX ADMIN — LISINOPRIL 1 MG: 20 TABLET ORAL at 21:00

## 2018-01-20 RX ADMIN — ISOSORBIDE DINITRATE 1 MG: 10 TABLET ORAL at 14:16

## 2018-01-20 RX ADMIN — OXYMETAZOLINE HYDROCHLORIDE 1 SPRAY: 5 SPRAY NASAL at 19:21

## 2018-01-20 RX ADMIN — LACOSAMIDE 1 MLS/HR: 10 INJECTION INTRAVENOUS at 09:17

## 2018-01-20 RX ADMIN — OXYMETAZOLINE HYDROCHLORIDE 1 SPRAY: 5 SPRAY NASAL at 16:00

## 2018-01-20 RX ADMIN — OXYMETAZOLINE HYDROCHLORIDE 1 SPRAY: 5 SPRAY NASAL at 19:59

## 2018-01-20 RX ADMIN — OXYMETAZOLINE HYDROCHLORIDE 1 SPRAY: 5 SPRAY NASAL at 17:00

## 2018-01-20 RX ADMIN — CHLORHEXIDINE GLUCONATE 1 ML: 1.2 RINSE ORAL at 21:00

## 2018-01-20 RX ADMIN — OXYMETAZOLINE HYDROCHLORIDE 1 SPRAY: 5 SPRAY NASAL at 23:06

## 2018-01-21 LAB
ALBUMIN SERPL-MCNC: 2.8 G/DL (ref 3.4–5)
ANION GAP SERPL CALC-SCNC: 14 MMOL/L (ref 6–14)
BASE EXCESS ABG: -6 MMOL/L (ref -3–3)
BLOOD UREA NITROGEN: 73 MG/DL (ref 7–20)
CALCIUM: 9.4 MG/DL (ref 8.5–10.1)
CHLORIDE: 102 MMOL/L (ref 98–107)
CO2 SERPL-SCNC: 25 MMOL/L (ref 21–32)
CREAT SERPL-MCNC: 7.1 MG/DL (ref 0.6–1)
GFR SERPLBLD BASED ON 1.73 SQ M-ARVRAT: 7.3 ML/MIN
GLUCOSE SERPL-MCNC: 107 MG/DL (ref 70–99)
HCO3 ABG: 19 MMOL/L (ref 21–28)
MAGNESIUM: 2.3 MG/DL (ref 1.8–2.4)
PCO2 ABG: 35 MMHG (ref 35–46)
PH ABG: 7.35 (ref 7.35–7.45)
PHOSPHORUS: 4.8 MG/DL (ref 2.6–4.7)
PO2 ABG: 64 MMHG (ref 75–108)
POTASSIUM SERPL-SCNC: 4.3 MMOL/L (ref 3.5–5.1)
SAT O2 ABG: 88 % (ref 92–99)
SODIUM: 141 MMOL/L (ref 136–145)

## 2018-01-21 RX ADMIN — OXYMETAZOLINE HYDROCHLORIDE 1 SPRAY: 5 SPRAY NASAL at 02:00

## 2018-01-21 RX ADMIN — LACOSAMIDE 1 MLS/HR: 10 INJECTION INTRAVENOUS at 08:52

## 2018-01-21 RX ADMIN — LABETALOL HYDROCHLORIDE 1 MG: 5 INJECTION, SOLUTION INTRAVENOUS at 05:34

## 2018-01-21 RX ADMIN — OXYMETAZOLINE HYDROCHLORIDE 1 SPRAY: 5 SPRAY NASAL at 04:00

## 2018-01-21 RX ADMIN — OXYMETAZOLINE HYDROCHLORIDE 1 SPRAY: 5 SPRAY NASAL at 04:55

## 2018-01-21 RX ADMIN — OXYMETAZOLINE HYDROCHLORIDE 1 SPRAY: 5 SPRAY NASAL at 08:00

## 2018-01-21 RX ADMIN — OXYMETAZOLINE HYDROCHLORIDE 1 SPRAY: 5 SPRAY NASAL at 03:00

## 2018-01-21 RX ADMIN — LABETALOL HYDROCHLORIDE 1 MG: 5 INJECTION, SOLUTION INTRAVENOUS at 02:07

## 2018-01-21 RX ADMIN — LACOSAMIDE 1 MLS/HR: 10 INJECTION INTRAVENOUS at 20:40

## 2018-01-21 RX ADMIN — LISINOPRIL 1 MG: 20 TABLET ORAL at 08:59

## 2018-01-21 RX ADMIN — OXYMETAZOLINE HYDROCHLORIDE 1 SPRAY: 5 SPRAY NASAL at 07:00

## 2018-01-21 RX ADMIN — LABETALOL HYDROCHLORIDE 1 MG: 5 INJECTION, SOLUTION INTRAVENOUS at 08:32

## 2018-01-21 RX ADMIN — PIPERACILLIN SODIUM AND TAZOBACTAM SODIUM 1 MLS/HR: 2; .25 INJECTION, POWDER, LYOPHILIZED, FOR SOLUTION INTRAVENOUS at 23:28

## 2018-01-21 RX ADMIN — PIPERACILLIN SODIUM AND TAZOBACTAM SODIUM 1 MLS/HR: 2; .25 INJECTION, POWDER, LYOPHILIZED, FOR SOLUTION INTRAVENOUS at 14:27

## 2018-01-21 RX ADMIN — CLONIDINE 1 PATCH: 0.2 PATCH TRANSDERMAL at 16:52

## 2018-01-21 RX ADMIN — HYDRALAZINE HYDROCHLORIDE 1 MG: 20 INJECTION INTRAMUSCULAR; INTRAVENOUS at 04:16

## 2018-01-21 RX ADMIN — ISOSORBIDE DINITRATE 1 MG: 10 TABLET ORAL at 08:58

## 2018-01-21 RX ADMIN — OXYMETAZOLINE HYDROCHLORIDE 1 SPRAY: 5 SPRAY NASAL at 09:00

## 2018-01-21 RX ADMIN — OXYMETAZOLINE HYDROCHLORIDE 1 SPRAY: 5 SPRAY NASAL at 00:13

## 2018-01-21 RX ADMIN — LABETALOL HCL 1 MG: 200 TABLET, FILM COATED ORAL at 21:00

## 2018-01-21 RX ADMIN — METOPROLOL TARTRATE 1 MG: 5 INJECTION INTRAVENOUS at 10:41

## 2018-01-21 RX ADMIN — LABETALOL HYDROCHLORIDE 1 MG: 5 INJECTION, SOLUTION INTRAVENOUS at 20:05

## 2018-01-21 RX ADMIN — HYDRALAZINE HYDROCHLORIDE 1 MG: 20 INJECTION INTRAMUSCULAR; INTRAVENOUS at 23:30

## 2018-01-21 RX ADMIN — HYDRALAZINE HYDROCHLORIDE 1 MG: 20 INJECTION INTRAMUSCULAR; INTRAVENOUS at 08:31

## 2018-01-21 RX ADMIN — LISINOPRIL 1 MG: 20 TABLET ORAL at 21:00

## 2018-01-21 RX ADMIN — OXYMETAZOLINE HYDROCHLORIDE 1 SPRAY: 5 SPRAY NASAL at 01:11

## 2018-01-21 RX ADMIN — ISOSORBIDE DINITRATE 1 MG: 10 TABLET ORAL at 14:00

## 2018-01-21 RX ADMIN — HALOPERIDOL LACTATE 1 MG: 5 INJECTION, SOLUTION INTRAMUSCULAR at 23:17

## 2018-01-21 RX ADMIN — LABETALOL HYDROCHLORIDE 1 MG: 5 INJECTION, SOLUTION INTRAVENOUS at 14:25

## 2018-01-21 RX ADMIN — GLYCERIN, ISOLEUCINE, LEUCINE, LYSINE, METHIONINE, PHENYLALANINE, THREONINE, TRYPTOPHAN, VALINE, ALANINE, GLYCINE, ARGININE, HISTIDINE, PROLINE, SERINE, CYSTEINE, SODIUM ACETATE, MAGNESIUM ACETATE, CALCIUM ACETATE, SODIUM CHLORIDE, POTASSIUM CHLORIDE, PHOSPHORIC ACID, AND POTASSIUM METABISULFITE 1 MLS/HR
3; .21; .27; .22; .16; .17; .12; .046; .2; .21; .42; .29; .085; .34; .18; .014; .2; .054; .026; .12; .15; .041 INJECTION INTRAVENOUS at 20:05

## 2018-01-21 RX ADMIN — PANTOPRAZOLE SODIUM 1 MG: 40 INJECTION, POWDER, FOR SOLUTION INTRAVENOUS at 09:01

## 2018-01-21 RX ADMIN — PIPERACILLIN SODIUM AND TAZOBACTAM SODIUM 1 MLS/HR: 2; .25 INJECTION, POWDER, LYOPHILIZED, FOR SOLUTION INTRAVENOUS at 04:49

## 2018-01-21 RX ADMIN — ISOSORBIDE DINITRATE 1 MG: 10 TABLET ORAL at 21:00

## 2018-01-21 RX ADMIN — OXYMETAZOLINE HYDROCHLORIDE 1 SPRAY: 5 SPRAY NASAL at 06:00

## 2018-01-21 RX ADMIN — LABETALOL HYDROCHLORIDE 1 MG: 5 INJECTION, SOLUTION INTRAVENOUS at 11:27

## 2018-01-21 RX ADMIN — LABETALOL HCL 1 MG: 200 TABLET, FILM COATED ORAL at 08:59

## 2018-01-21 RX ADMIN — HYDRALAZINE HYDROCHLORIDE 1 MG: 20 INJECTION INTRAMUSCULAR; INTRAVENOUS at 12:23

## 2018-01-22 LAB
ADD MAN DIFF?: NO
ADD MAN DIFF?: NO
ALBUMIN SERPL-MCNC: 2.5 G/DL (ref 3.4–5)
ALBUMIN/GLOB SERPL: 0.5 {RATIO} (ref 1–1.7)
ALP SERPL-CCNC: 92 U/L (ref 46–116)
ALT (SGPT): 32 U/L (ref 14–59)
ANION GAP SERPL CALC-SCNC: 11 MMOL/L (ref 6–14)
ANION GAP SERPL CALC-SCNC: 16 MMOL/L (ref 6–14)
AST SERPL-CCNC: 23 U/L (ref 15–37)
BASE EXCESS ABG: -1 MMOL/L (ref -3–3)
BASE EXCESS ABG: -2 MMOL/L (ref -3–3)
BASO #: 0 X10^3/UL (ref 0–0.2)
BASO #: 0.1 X10^3/UL (ref 0–0.2)
BASO %: 1 % (ref 0–3)
BASO %: 1 % (ref 0–3)
BLOOD UREA NITROGEN: 84 MG/DL (ref 7–20)
BLOOD UREA NITROGEN: 86 MG/DL (ref 7–20)
BUN/CREAT SERPL: 11 (ref 6–20)
CALCIUM: 8.9 MG/DL (ref 8.5–10.1)
CALCIUM: 9 MG/DL (ref 8.5–10.1)
CHLORIDE: 104 MMOL/L (ref 98–107)
CHLORIDE: 105 MMOL/L (ref 98–107)
CO2 SERPL-SCNC: 21 MMOL/L (ref 21–32)
CO2 SERPL-SCNC: 31 MMOL/L (ref 21–32)
CREAT SERPL-MCNC: 7.5 MG/DL (ref 0.6–1)
CREAT SERPL-MCNC: 8 MG/DL (ref 0.6–1)
EOS #: 0 X10^3/UL (ref 0–0.7)
EOS #: 0.2 X10^3/UL (ref 0–0.7)
EOS %: 0 % (ref 0–3)
EOS %: 2 % (ref 0–3)
FIO2 ABG: 100
FIO2 ABG: 21
FIO2 ABG: 40
GFR SERPLBLD BASED ON 1.73 SQ M-ARVRAT: 6.3 ML/MIN
GFR SERPLBLD BASED ON 1.73 SQ M-ARVRAT: 6.8 ML/MIN
GLOBULIN SER-MCNC: 4.9 G/DL (ref 2.2–3.8)
GLUCOSE SERPL-MCNC: 101 MG/DL (ref 70–99)
GLUCOSE SERPL-MCNC: 220 MG/DL (ref 70–99)
HCG SERPL-ACNC: 13.9 X10^3/UL (ref 4–11)
HCG SERPL-ACNC: 8.6 X10^3/UL (ref 4–11)
HCO3 ABG: 22 MMOL/L (ref 21–28)
HCO3 ABG: 22 MMOL/L (ref 21–28)
HEMATOCRIT: 28.6 % (ref 36–47)
HEMATOCRIT: 29.6 % (ref 36–47)
HEMOGLOBIN: 9.2 G/DL (ref 12–15.5)
HEMOGLOBIN: 9.5 G/DL (ref 12–15.5)
INR: 1.2 (ref 0.8–1.1)
LYMPH #: 0.9 X10^3/UL (ref 1–4.8)
LYMPH #: 2.4 X10^3/UL (ref 1–4.8)
LYMPH %: 10 % (ref 24–48)
LYMPH %: 18 % (ref 24–48)
MAGNESIUM: 2.4 MG/DL (ref 1.8–2.4)
MAGNESIUM: 2.5 MG/DL (ref 1.8–2.4)
MEAN CORPUSCULAR HEMOGLOBIN: 30 PG (ref 25–35)
MEAN CORPUSCULAR HEMOGLOBIN: 30 PG (ref 25–35)
MEAN CORPUSCULAR HGB CONC: 32 G/DL (ref 31–37)
MEAN CORPUSCULAR HGB CONC: 32 G/DL (ref 31–37)
MEAN CORPUSCULAR VOLUME: 93 FL (ref 79–100)
MEAN CORPUSCULAR VOLUME: 94 FL (ref 79–100)
MONO #: 1.2 X10^3/UL (ref 0–1.1)
MONO #: 1.2 X10^3/UL (ref 0–1.1)
MONO %: 14 % (ref 0–9)
MONO %: 9 % (ref 0–9)
NEUT #: 10 X10^3UL (ref 1.8–7.7)
NEUT #: 6.4 X10^3UL (ref 1.8–7.7)
NEUT %: 72 % (ref 31–73)
NEUT %: 75 % (ref 31–73)
PCO2 ABG: 31 MMHG (ref 35–46)
PCO2 ABG: 33 MMHG (ref 35–46)
PH ABG: 7.45 (ref 7.35–7.45)
PH ABG: 7.48 (ref 7.35–7.45)
PHOSPHORUS: 4.5 MG/DL (ref 2.6–4.7)
PHOSPHORUS: 5.6 MG/DL (ref 2.6–4.7)
PLATELET COUNT: 268 X10^3/UL (ref 140–400)
PLATELET COUNT: 345 X10^3/UL (ref 140–400)
PO2 ABG: 142 MMHG (ref 75–108)
PO2 ABG: > 503 MMHG (ref 75–108)
POC GLUCOSE: 126 MG/DL (ref 70–99)
POTASSIUM SERPL-SCNC: 3.8 MMOL/L (ref 3.5–5.1)
POTASSIUM SERPL-SCNC: 4.3 MMOL/L (ref 3.5–5.1)
PROTHROMBIN TIME PATIENT: 14.2 SEC (ref 11.7–14)
RED BLOOD COUNT: 3.07 X10^6/UL (ref 3.5–5.4)
RED BLOOD COUNT: 3.16 X10^6/UL (ref 3.5–5.4)
RED CELL DISTRIBUTION WIDTH: 14.7 % (ref 11.5–14.5)
RED CELL DISTRIBUTION WIDTH: 14.8 % (ref 11.5–14.5)
SAT O2 ABG: 98 % (ref 92–99)
SAT O2 ABG: 99 % (ref 92–99)
SODIUM: 141 MMOL/L (ref 136–145)
SODIUM: 147 MMOL/L (ref 136–145)
TOTAL BILIRUBIN: 0.6 MG/DL (ref 0.2–1)
TOTAL PROTEIN: 7.4 G/DL (ref 6.4–8.2)

## 2018-01-22 RX ADMIN — GLYCERIN, ISOLEUCINE, LEUCINE, LYSINE, METHIONINE, PHENYLALANINE, THREONINE, TRYPTOPHAN, VALINE, ALANINE, GLYCINE, ARGININE, HISTIDINE, PROLINE, SERINE, CYSTEINE, SODIUM ACETATE, MAGNESIUM ACETATE, CALCIUM ACETATE, SODIUM CHLORIDE, POTASSIUM CHLORIDE, PHOSPHORIC ACID, AND POTASSIUM METABISULFITE 1 MLS/HR
3; .21; .27; .22; .16; .17; .12; .046; .2; .21; .42; .29; .085; .34; .18; .014; .2; .054; .026; .12; .15; .041 INJECTION INTRAVENOUS at 23:11

## 2018-01-22 RX ADMIN — HALOPERIDOL LACTATE 1 MG: 5 INJECTION, SOLUTION INTRAMUSCULAR at 21:02

## 2018-01-22 RX ADMIN — ISOSORBIDE DINITRATE 1 MG: 10 TABLET ORAL at 15:00

## 2018-01-22 RX ADMIN — PANTOPRAZOLE SODIUM 1 MG: 40 INJECTION, POWDER, FOR SOLUTION INTRAVENOUS at 12:07

## 2018-01-22 RX ADMIN — METHYLPREDNISOLONE SODIUM SUCCINATE 1 MG: 40 INJECTION, POWDER, FOR SOLUTION INTRAMUSCULAR; INTRAVENOUS at 15:00

## 2018-01-22 RX ADMIN — ISOSORBIDE DINITRATE 1 MG: 10 TABLET ORAL at 12:09

## 2018-01-22 RX ADMIN — PIPERACILLIN SODIUM AND TAZOBACTAM SODIUM 1 MLS/HR: 2; .25 INJECTION, POWDER, LYOPHILIZED, FOR SOLUTION INTRAVENOUS at 05:47

## 2018-01-22 RX ADMIN — SCOPOLAMINE 1 PATCH: 1 PATCH, EXTENDED RELEASE TRANSDERMAL at 21:06

## 2018-01-22 RX ADMIN — LACOSAMIDE 1 MLS/HR: 10 INJECTION INTRAVENOUS at 12:08

## 2018-01-22 RX ADMIN — LABETALOL HCL 1 MG: 200 TABLET, FILM COATED ORAL at 12:09

## 2018-01-22 RX ADMIN — PROPOFOL 1 MLS/HR: 10 INJECTION, EMULSION INTRAVENOUS at 05:48

## 2018-01-22 RX ADMIN — LACOSAMIDE 1 MLS/HR: 10 INJECTION INTRAVENOUS at 21:01

## 2018-01-22 RX ADMIN — ISOSORBIDE DINITRATE 1 MG: 10 TABLET ORAL at 21:04

## 2018-01-22 RX ADMIN — PIPERACILLIN SODIUM AND TAZOBACTAM SODIUM 1 MLS/HR: 2; .25 INJECTION, POWDER, LYOPHILIZED, FOR SOLUTION INTRAVENOUS at 15:02

## 2018-01-22 RX ADMIN — PIPERACILLIN SODIUM AND TAZOBACTAM SODIUM 1 MLS/HR: 2; .25 INJECTION, POWDER, LYOPHILIZED, FOR SOLUTION INTRAVENOUS at 23:03

## 2018-01-22 RX ADMIN — MORPHINE SULFATE 1 MG: 2 INJECTION, SOLUTION INTRAMUSCULAR; INTRAVENOUS at 21:06

## 2018-01-22 RX ADMIN — METHYLPREDNISOLONE SODIUM SUCCINATE 1 MG: 40 INJECTION, POWDER, FOR SOLUTION INTRAMUSCULAR; INTRAVENOUS at 21:02

## 2018-01-22 RX ADMIN — METHYLPREDNISOLONE SODIUM SUCCINATE 1 MG: 125 INJECTION, POWDER, FOR SOLUTION INTRAMUSCULAR; INTRAVENOUS at 12:12

## 2018-01-22 RX ADMIN — PROPOFOL 1 MLS/HR: 10 INJECTION, EMULSION INTRAVENOUS at 15:01

## 2018-01-22 RX ADMIN — LABETALOL HCL 1 MG: 200 TABLET, FILM COATED ORAL at 21:05

## 2018-01-23 LAB
ADD MAN DIFF?: NO
ALBUMIN SERPL-MCNC: 2.6 G/DL (ref 3.4–5)
ALBUMIN/GLOB SERPL: 0.5 {RATIO} (ref 1–1.7)
ALP SERPL-CCNC: 83 U/L (ref 46–116)
ALT (SGPT): 34 U/L (ref 14–59)
ANION GAP SERPL CALC-SCNC: 17 MMOL/L (ref 6–14)
AST SERPL-CCNC: 41 U/L (ref 15–37)
BASE EXCESS ABG: 0 MMOL/L (ref -3–3)
BASO #: 0 X10^3/UL (ref 0–0.2)
BASO %: 0 % (ref 0–3)
BLOOD UREA NITROGEN: 54 MG/DL (ref 7–20)
BUN/CREAT SERPL: 10 (ref 6–20)
CALCIUM: 9.1 MG/DL (ref 8.5–10.1)
CHLORIDE: 96 MMOL/L (ref 98–107)
CO2 SERPL-SCNC: 25 MMOL/L (ref 21–32)
CREAT SERPL-MCNC: 5.4 MG/DL (ref 0.6–1)
EOS #: 0 X10^3/UL (ref 0–0.7)
EOS %: 0 % (ref 0–3)
FIO2 ABG: 40
GFR SERPLBLD BASED ON 1.73 SQ M-ARVRAT: 10 ML/MIN
GLOBULIN SER-MCNC: 5 G/DL (ref 2.2–3.8)
GLUCOSE SERPL-MCNC: 132 MG/DL (ref 70–99)
HCG SERPL-ACNC: 5.2 X10^3/UL (ref 4–11)
HCO3 ABG: 24 MMOL/L (ref 21–28)
HEMATOCRIT: 26.1 % (ref 36–47)
HEMOGLOBIN: 8.4 G/DL (ref 12–15.5)
LYMPH #: 0.6 X10^3/UL (ref 1–4.8)
LYMPH %: 11 % (ref 24–48)
MAGNESIUM: 2.2 MG/DL (ref 1.8–2.4)
MEAN CORPUSCULAR HEMOGLOBIN: 30 PG (ref 25–35)
MEAN CORPUSCULAR HGB CONC: 32 G/DL (ref 31–37)
MEAN CORPUSCULAR VOLUME: 92 FL (ref 79–100)
MONO #: 0.5 X10^3/UL (ref 0–1.1)
MONO %: 10 % (ref 0–9)
NEUT #: 4.1 X10^3UL (ref 1.8–7.7)
NEUT %: 79 % (ref 31–73)
PCO2 ABG: 33 MMHG (ref 35–46)
PH ABG: 7.47 (ref 7.35–7.45)
PHOSPHORUS: 6.5 MG/DL (ref 2.6–4.7)
PLATELET COUNT: 270 X10^3/UL (ref 140–400)
PO2 ABG: 143 MMHG (ref 75–108)
POC GLUCOSE: 134 MG/DL (ref 70–99)
POC GLUCOSE: 141 MG/DL (ref 70–99)
POTASSIUM SERPL-SCNC: 4.3 MMOL/L (ref 3.5–5.1)
RED BLOOD COUNT: 2.85 X10^6/UL (ref 3.5–5.4)
RED CELL DISTRIBUTION WIDTH: 14.1 % (ref 11.5–14.5)
SAT O2 ABG: 99 % (ref 92–99)
SODIUM: 138 MMOL/L (ref 136–145)
TOTAL BILIRUBIN: 0.5 MG/DL (ref 0.2–1)
TOTAL PROTEIN: 7.6 G/DL (ref 6.4–8.2)

## 2018-01-23 RX ADMIN — INSULIN ASPART 1 UNITS: 100 INJECTION, SOLUTION INTRAVENOUS; SUBCUTANEOUS at 18:00

## 2018-01-23 RX ADMIN — GLYCERIN, ISOLEUCINE, LEUCINE, LYSINE, METHIONINE, PHENYLALANINE, THREONINE, TRYPTOPHAN, VALINE, ALANINE, GLYCINE, ARGININE, HISTIDINE, PROLINE, SERINE, CYSTEINE, SODIUM ACETATE, MAGNESIUM ACETATE, CALCIUM ACETATE, SODIUM CHLORIDE, POTASSIUM CHLORIDE, PHOSPHORIC ACID, AND POTASSIUM METABISULFITE 1 MLS/HR
3; .21; .27; .22; .16; .17; .12; .046; .2; .21; .42; .29; .085; .34; .18; .014; .2; .054; .026; .12; .15; .041 INJECTION INTRAVENOUS at 08:11

## 2018-01-23 RX ADMIN — PIPERACILLIN SODIUM AND TAZOBACTAM SODIUM 1 MLS/HR: 2; .25 INJECTION, POWDER, LYOPHILIZED, FOR SOLUTION INTRAVENOUS at 21:26

## 2018-01-23 RX ADMIN — INSULIN ASPART 1 UNITS: 100 INJECTION, SOLUTION INTRAVENOUS; SUBCUTANEOUS at 06:00

## 2018-01-23 RX ADMIN — ISOSORBIDE DINITRATE 1 MG: 10 TABLET ORAL at 08:51

## 2018-01-23 RX ADMIN — GLYCERIN, ISOLEUCINE, LEUCINE, LYSINE, METHIONINE, PHENYLALANINE, THREONINE, TRYPTOPHAN, VALINE, ALANINE, GLYCINE, ARGININE, HISTIDINE, PROLINE, SERINE, CYSTEINE, SODIUM ACETATE, MAGNESIUM ACETATE, CALCIUM ACETATE, SODIUM CHLORIDE, POTASSIUM CHLORIDE, PHOSPHORIC ACID, AND POTASSIUM METABISULFITE 1 MLS/HR
3; .21; .27; .22; .16; .17; .12; .046; .2; .21; .42; .29; .085; .34; .18; .014; .2; .054; .026; .12; .15; .041 INJECTION INTRAVENOUS at 16:26

## 2018-01-23 RX ADMIN — ISOSORBIDE DINITRATE 1 MG: 10 TABLET ORAL at 21:00

## 2018-01-23 RX ADMIN — PIPERACILLIN SODIUM AND TAZOBACTAM SODIUM 1 MLS/HR: 2; .25 INJECTION, POWDER, LYOPHILIZED, FOR SOLUTION INTRAVENOUS at 16:37

## 2018-01-23 RX ADMIN — GLYCERIN, ISOLEUCINE, LEUCINE, LYSINE, METHIONINE, PHENYLALANINE, THREONINE, TRYPTOPHAN, VALINE, ALANINE, GLYCINE, ARGININE, HISTIDINE, PROLINE, SERINE, CYSTEINE, SODIUM ACETATE, MAGNESIUM ACETATE, CALCIUM ACETATE, SODIUM CHLORIDE, POTASSIUM CHLORIDE, PHOSPHORIC ACID, AND POTASSIUM METABISULFITE 1 MLS/HR
3; .21; .27; .22; .16; .17; .12; .046; .2; .21; .42; .29; .085; .34; .18; .014; .2; .054; .026; .12; .15; .041 INJECTION INTRAVENOUS at 23:03

## 2018-01-23 RX ADMIN — PIPERACILLIN SODIUM AND TAZOBACTAM SODIUM 1 MLS/HR: 2; .25 INJECTION, POWDER, LYOPHILIZED, FOR SOLUTION INTRAVENOUS at 06:05

## 2018-01-23 RX ADMIN — INSULIN ASPART 1 UNITS: 100 INJECTION, SOLUTION INTRAVENOUS; SUBCUTANEOUS at 12:00

## 2018-01-23 RX ADMIN — ISOSORBIDE DINITRATE 1 MG: 10 TABLET ORAL at 16:37

## 2018-01-23 RX ADMIN — LACOSAMIDE 1 MLS/HR: 10 INJECTION INTRAVENOUS at 21:36

## 2018-01-23 RX ADMIN — LACOSAMIDE 1 MLS/HR: 10 INJECTION INTRAVENOUS at 09:37

## 2018-01-23 RX ADMIN — METHYLPREDNISOLONE SODIUM SUCCINATE 1 MG: 40 INJECTION, POWDER, FOR SOLUTION INTRAMUSCULAR; INTRAVENOUS at 06:04

## 2018-01-23 RX ADMIN — PROPOFOL 1 MLS/HR: 10 INJECTION, EMULSION INTRAVENOUS at 23:04

## 2018-01-23 RX ADMIN — METHYLPREDNISOLONE SODIUM SUCCINATE 1 MG: 40 INJECTION, POWDER, FOR SOLUTION INTRAMUSCULAR; INTRAVENOUS at 16:38

## 2018-01-23 RX ADMIN — PROPOFOL 1 MLS/HR: 10 INJECTION, EMULSION INTRAVENOUS at 03:39

## 2018-01-23 RX ADMIN — LABETALOL HCL 1 MG: 200 TABLET, FILM COATED ORAL at 08:50

## 2018-01-23 RX ADMIN — PROPOFOL 1 MLS/HR: 10 INJECTION, EMULSION INTRAVENOUS at 16:28

## 2018-01-23 RX ADMIN — METHYLPREDNISOLONE SODIUM SUCCINATE 1 MG: 40 INJECTION, POWDER, FOR SOLUTION INTRAMUSCULAR; INTRAVENOUS at 21:25

## 2018-01-23 RX ADMIN — PANTOPRAZOLE SODIUM 1 MG: 40 INJECTION, POWDER, FOR SOLUTION INTRAVENOUS at 08:49

## 2018-01-23 RX ADMIN — LABETALOL HCL 1 MG: 200 TABLET, FILM COATED ORAL at 21:00

## 2018-01-23 RX ADMIN — INSULIN ASPART 1 UNITS: 100 INJECTION, SOLUTION INTRAVENOUS; SUBCUTANEOUS at 00:00

## 2018-01-24 LAB
% BANDS: 3 % (ref 0–9)
% BASOS: 1 % (ref 0–3)
% LYMPHS: 10 % (ref 24–48)
% MONOS: 3 % (ref 0–10)
% MYELOS: 1 % (ref 0–0)
% SEGS: 82 % (ref 35–66)
ADD MAN DIFF?: YES
ALBUMIN SERPL-MCNC: 2.5 G/DL (ref 3.4–5)
ALBUMIN/GLOB SERPL: 0.5 {RATIO} (ref 1–1.7)
ALP SERPL-CCNC: 76 U/L (ref 46–116)
ALT (SGPT): 33 U/L (ref 14–59)
ANION GAP SERPL CALC-SCNC: 18 MMOL/L (ref 6–14)
AST SERPL-CCNC: 36 U/L (ref 15–37)
BASE EXCESS ABG: -6 MMOL/L (ref -3–3)
BASO #: 0 X10^3/UL (ref 0–0.2)
BASO %: 0 % (ref 0–3)
BLOOD UREA NITROGEN: 91 MG/DL (ref 7–20)
BUN/CREAT SERPL: 14 (ref 6–20)
CALCIUM: 8.5 MG/DL (ref 8.5–10.1)
CHLORIDE: 92 MMOL/L (ref 98–107)
CO2 SERPL-SCNC: 21 MMOL/L (ref 21–32)
CREAT SERPL-MCNC: 6.7 MG/DL (ref 0.6–1)
EOS #: 0 X10^3/UL (ref 0–0.7)
EOS %: 0 % (ref 0–3)
FIO2 ABG: 40
GFR SERPLBLD BASED ON 1.73 SQ M-ARVRAT: 7.8 ML/MIN
GLOBULIN SER-MCNC: 4.7 G/DL (ref 2.2–3.8)
GLUCOSE SERPL-MCNC: 169 MG/DL (ref 70–99)
HCG SERPL-ACNC: 6.1 X10^3/UL (ref 4–11)
HCO3 ABG: 19 MMOL/L (ref 21–28)
HEMATOCRIT: 24 % (ref 36–47)
HEMOGLOBIN: 8 G/DL (ref 12–15.5)
LYMPH #: 0.5 X10^3/UL (ref 1–4.8)
LYMPH %: 9 % (ref 24–48)
MEAN CORPUSCULAR HEMOGLOBIN: 31 PG (ref 25–35)
MEAN CORPUSCULAR HGB CONC: 33 G/DL (ref 31–37)
MEAN CORPUSCULAR VOLUME: 92 FL (ref 79–100)
MONO #: 0.4 X10^3/UL (ref 0–1.1)
MONO %: 7 % (ref 0–9)
NEUT #: 5.1 X10^3UL (ref 1.8–7.7)
NEUT %: 84 % (ref 31–73)
OVALOCYTES: (no result)
PCO2 ABG: 31 MMHG (ref 35–46)
PH ABG: 7.4 (ref 7.35–7.45)
PHOSPHORUS: 6.2 MG/DL (ref 2.6–4.7)
PLATELET COUNT: 271 X10^3/UL (ref 140–400)
PLT ESTIMATE: ADEQUATE
PO2 ABG: 146 MMHG (ref 75–108)
POC GLUCOSE: 127 MG/DL (ref 70–99)
POC GLUCOSE: 130 MG/DL (ref 70–99)
POC GLUCOSE: 139 MG/DL (ref 70–99)
POC GLUCOSE: 154 MG/DL (ref 70–99)
POLYCHROMASIA: PRESENT
POTASSIUM SERPL-SCNC: 4.6 MMOL/L (ref 3.5–5.1)
RED BLOOD COUNT: 2.6 X10^6/UL (ref 3.5–5.4)
RED CELL DISTRIBUTION WIDTH: 14.4 % (ref 11.5–14.5)
SAT O2 ABG: 98 % (ref 92–99)
SCHISTOCYTES: (no result)
SODIUM: 131 MMOL/L (ref 136–145)
TOTAL BILIRUBIN: 0.5 MG/DL (ref 0.2–1)
TOTAL PROTEIN: 7.2 G/DL (ref 6.4–8.2)

## 2018-01-24 RX ADMIN — GLYCERIN, ISOLEUCINE, LEUCINE, LYSINE, METHIONINE, PHENYLALANINE, THREONINE, TRYPTOPHAN, VALINE, ALANINE, GLYCINE, ARGININE, HISTIDINE, PROLINE, SERINE, CYSTEINE, SODIUM ACETATE, MAGNESIUM ACETATE, CALCIUM ACETATE, SODIUM CHLORIDE, POTASSIUM CHLORIDE, PHOSPHORIC ACID, AND POTASSIUM METABISULFITE 1 MLS/HR
3; .21; .27; .22; .16; .17; .12; .046; .2; .21; .42; .29; .085; .34; .18; .014; .2; .054; .026; .12; .15; .041 INJECTION INTRAVENOUS at 09:11

## 2018-01-24 RX ADMIN — ISOSORBIDE DINITRATE 1 MG: 10 TABLET ORAL at 08:45

## 2018-01-24 RX ADMIN — PIPERACILLIN SODIUM AND TAZOBACTAM SODIUM 1 MLS/HR: 2; .25 INJECTION, POWDER, LYOPHILIZED, FOR SOLUTION INTRAVENOUS at 05:35

## 2018-01-24 RX ADMIN — PROPOFOL 1 MLS/HR: 10 INJECTION, EMULSION INTRAVENOUS at 17:09

## 2018-01-24 RX ADMIN — PROPOFOL 1 MLS/HR: 10 INJECTION, EMULSION INTRAVENOUS at 03:51

## 2018-01-24 RX ADMIN — PIPERACILLIN SODIUM AND TAZOBACTAM SODIUM 1 MLS/HR: 2; .25 INJECTION, POWDER, LYOPHILIZED, FOR SOLUTION INTRAVENOUS at 17:10

## 2018-01-24 RX ADMIN — PROPOFOL 1 MLS/HR: 10 INJECTION, EMULSION INTRAVENOUS at 08:42

## 2018-01-24 RX ADMIN — LISINOPRIL 1 MG: 20 TABLET ORAL at 12:30

## 2018-01-24 RX ADMIN — LABETALOL HCL 1 MG: 200 TABLET, FILM COATED ORAL at 20:35

## 2018-01-24 RX ADMIN — PIPERACILLIN SODIUM AND TAZOBACTAM SODIUM 1 MLS/HR: 2; .25 INJECTION, POWDER, LYOPHILIZED, FOR SOLUTION INTRAVENOUS at 21:31

## 2018-01-24 RX ADMIN — METHYLPREDNISOLONE SODIUM SUCCINATE 1 MG: 40 INJECTION, POWDER, FOR SOLUTION INTRAMUSCULAR; INTRAVENOUS at 21:31

## 2018-01-24 RX ADMIN — LACOSAMIDE 1 MLS/HR: 10 INJECTION INTRAVENOUS at 21:09

## 2018-01-24 RX ADMIN — CHLORHEXIDINE GLUCONATE 1 ML: 1.2 RINSE ORAL at 20:46

## 2018-01-24 RX ADMIN — INSULIN ASPART 1 UNITS: 100 INJECTION, SOLUTION INTRAVENOUS; SUBCUTANEOUS at 12:00

## 2018-01-24 RX ADMIN — INSULIN ASPART 1 UNITS: 100 INJECTION, SOLUTION INTRAVENOUS; SUBCUTANEOUS at 05:38

## 2018-01-24 RX ADMIN — METHYLPREDNISOLONE SODIUM SUCCINATE 1 MG: 40 INJECTION, POWDER, FOR SOLUTION INTRAMUSCULAR; INTRAVENOUS at 17:09

## 2018-01-24 RX ADMIN — PANTOPRAZOLE SODIUM 1 MG: 40 INJECTION, POWDER, FOR SOLUTION INTRAVENOUS at 08:41

## 2018-01-24 RX ADMIN — LACOSAMIDE 1 MLS/HR: 10 INJECTION INTRAVENOUS at 08:43

## 2018-01-24 RX ADMIN — INSULIN ASPART 1 UNITS: 100 INJECTION, SOLUTION INTRAVENOUS; SUBCUTANEOUS at 00:00

## 2018-01-24 RX ADMIN — ISOSORBIDE DINITRATE 1 MG: 10 TABLET ORAL at 14:00

## 2018-01-24 RX ADMIN — PROPOFOL 1 MLS/HR: 10 INJECTION, EMULSION INTRAVENOUS at 22:44

## 2018-01-24 RX ADMIN — LABETALOL HCL 1 MG: 200 TABLET, FILM COATED ORAL at 08:43

## 2018-01-24 RX ADMIN — METHYLPREDNISOLONE SODIUM SUCCINATE 1 MG: 40 INJECTION, POWDER, FOR SOLUTION INTRAMUSCULAR; INTRAVENOUS at 05:35

## 2018-01-24 RX ADMIN — INSULIN ASPART 1 UNITS: 100 INJECTION, SOLUTION INTRAVENOUS; SUBCUTANEOUS at 18:00

## 2018-01-24 RX ADMIN — ISOSORBIDE DINITRATE 1 MG: 10 TABLET ORAL at 20:34

## 2018-01-25 LAB
ADD MAN DIFF?: NO
ALBUMIN SERPL-MCNC: 2.8 G/DL (ref 3.4–5)
ALBUMIN/GLOB SERPL: 0.6 {RATIO} (ref 1–1.7)
ALP SERPL-CCNC: 81 U/L (ref 46–116)
ALT (SGPT): 37 U/L (ref 14–59)
ANION GAP SERPL CALC-SCNC: 16 MMOL/L (ref 6–14)
AST SERPL-CCNC: 35 U/L (ref 15–37)
BASE EXCESS ABG: 0 MMOL/L (ref -3–3)
BASO #: 0 X10^3/UL (ref 0–0.2)
BASO %: 0 % (ref 0–3)
BLOOD UREA NITROGEN: 53 MG/DL (ref 7–20)
BUN/CREAT SERPL: 13 (ref 6–20)
CALCIUM: 8.7 MG/DL (ref 8.5–10.1)
CHLORIDE: 95 MMOL/L (ref 98–107)
CO2 SERPL-SCNC: 25 MMOL/L (ref 21–32)
CREAT SERPL-MCNC: 4.2 MG/DL (ref 0.6–1)
EOS #: 0 X10^3/UL (ref 0–0.7)
EOS %: 0 % (ref 0–3)
FIO2 ABG: 40
GFR SERPLBLD BASED ON 1.73 SQ M-ARVRAT: 13.3 ML/MIN
GLOBULIN SER-MCNC: 4.7 G/DL (ref 2.2–3.8)
GLUCOSE SERPL-MCNC: 168 MG/DL (ref 70–99)
HCG SERPL-ACNC: 6.6 X10^3/UL (ref 4–11)
HCO3 ABG: 23 MMOL/L (ref 21–28)
HEMATOCRIT: 24.8 % (ref 36–47)
HEMOGLOBIN: 8.2 G/DL (ref 12–15.5)
LYMPH #: 0.5 X10^3/UL (ref 1–4.8)
LYMPH %: 8 % (ref 24–48)
MEAN CORPUSCULAR HEMOGLOBIN: 30 PG (ref 25–35)
MEAN CORPUSCULAR HGB CONC: 33 G/DL (ref 31–37)
MEAN CORPUSCULAR VOLUME: 91 FL (ref 79–100)
MONO #: 1 X10^3/UL (ref 0–1.1)
MONO %: 15 % (ref 0–9)
NEUT #: 5.1 X10^3UL (ref 1.8–7.7)
NEUT %: 77 % (ref 31–73)
PCO2 ABG: 30 MMHG (ref 35–46)
PH ABG: 7.5 (ref 7.35–7.45)
PHOSPHORUS: 4.8 MG/DL (ref 2.6–4.7)
PLATELET COUNT: 324 X10^3/UL (ref 140–400)
PO2 ABG: 143 MMHG (ref 75–108)
POC GLUCOSE: 121 MG/DL (ref 70–99)
POC GLUCOSE: 128 MG/DL (ref 70–99)
POC GLUCOSE: 141 MG/DL (ref 70–99)
POC GLUCOSE: 146 MG/DL (ref 70–99)
POC GLUCOSE: 152 MG/DL (ref 70–99)
POTASSIUM SERPL-SCNC: 3.9 MMOL/L (ref 3.5–5.1)
RED BLOOD COUNT: 2.71 X10^6/UL (ref 3.5–5.4)
RED CELL DISTRIBUTION WIDTH: 14.6 % (ref 11.5–14.5)
SAT O2 ABG: 99 % (ref 92–99)
SODIUM: 136 MMOL/L (ref 136–145)
TOTAL BILIRUBIN: 0.5 MG/DL (ref 0.2–1)
TOTAL PROTEIN: 7.5 G/DL (ref 6.4–8.2)

## 2018-01-25 RX ADMIN — PROPOFOL 1 MLS/HR: 10 INJECTION, EMULSION INTRAVENOUS at 09:47

## 2018-01-25 RX ADMIN — PIPERACILLIN SODIUM AND TAZOBACTAM SODIUM 1 MLS/HR: 2; .25 INJECTION, POWDER, LYOPHILIZED, FOR SOLUTION INTRAVENOUS at 22:10

## 2018-01-25 RX ADMIN — INSULIN ASPART 1 UNITS: 100 INJECTION, SOLUTION INTRAVENOUS; SUBCUTANEOUS at 23:56

## 2018-01-25 RX ADMIN — PIPERACILLIN SODIUM AND TAZOBACTAM SODIUM 1 MLS/HR: 2; .25 INJECTION, POWDER, LYOPHILIZED, FOR SOLUTION INTRAVENOUS at 05:57

## 2018-01-25 RX ADMIN — INSULIN ASPART 1 UNITS: 100 INJECTION, SOLUTION INTRAVENOUS; SUBCUTANEOUS at 17:43

## 2018-01-25 RX ADMIN — LACOSAMIDE 1 MLS/HR: 10 INJECTION INTRAVENOUS at 09:51

## 2018-01-25 RX ADMIN — ISOSORBIDE DINITRATE 1 MG: 10 TABLET ORAL at 09:49

## 2018-01-25 RX ADMIN — SCOPOLAMINE 1 PATCH: 1 PATCH, EXTENDED RELEASE TRANSDERMAL at 09:47

## 2018-01-25 RX ADMIN — CHLORHEXIDINE GLUCONATE 1 ML: 1.2 RINSE ORAL at 21:30

## 2018-01-25 RX ADMIN — METHYLPREDNISOLONE SODIUM SUCCINATE 1 MG: 40 INJECTION, POWDER, FOR SOLUTION INTRAMUSCULAR; INTRAVENOUS at 16:06

## 2018-01-25 RX ADMIN — PROPOFOL 1 MLS/HR: 10 INJECTION, EMULSION INTRAVENOUS at 21:35

## 2018-01-25 RX ADMIN — PROPOFOL 1 MLS/HR: 10 INJECTION, EMULSION INTRAVENOUS at 16:08

## 2018-01-25 RX ADMIN — INSULIN ASPART 1 UNITS: 100 INJECTION, SOLUTION INTRAVENOUS; SUBCUTANEOUS at 00:00

## 2018-01-25 RX ADMIN — METHYLPREDNISOLONE SODIUM SUCCINATE 1 MG: 40 INJECTION, POWDER, FOR SOLUTION INTRAMUSCULAR; INTRAVENOUS at 05:58

## 2018-01-25 RX ADMIN — INSULIN ASPART 1 UNITS: 100 INJECTION, SOLUTION INTRAVENOUS; SUBCUTANEOUS at 05:58

## 2018-01-25 RX ADMIN — CHLORHEXIDINE GLUCONATE 1 ML: 1.2 RINSE ORAL at 09:47

## 2018-01-25 RX ADMIN — ISOSORBIDE DINITRATE 1 MG: 10 TABLET ORAL at 16:06

## 2018-01-25 RX ADMIN — PIPERACILLIN SODIUM AND TAZOBACTAM SODIUM 1 MLS/HR: 2; .25 INJECTION, POWDER, LYOPHILIZED, FOR SOLUTION INTRAVENOUS at 16:12

## 2018-01-25 RX ADMIN — LABETALOL HCL 1 MG: 200 TABLET, FILM COATED ORAL at 09:48

## 2018-01-25 RX ADMIN — PANTOPRAZOLE SODIUM 1 MG: 40 INJECTION, POWDER, FOR SOLUTION INTRAVENOUS at 09:48

## 2018-01-25 RX ADMIN — LACOSAMIDE 1 MLS/HR: 10 INJECTION INTRAVENOUS at 21:30

## 2018-01-25 RX ADMIN — PROPOFOL 1 MLS/HR: 10 INJECTION, EMULSION INTRAVENOUS at 03:32

## 2018-01-25 RX ADMIN — METHYLPREDNISOLONE SODIUM SUCCINATE 1 MG: 40 INJECTION, POWDER, FOR SOLUTION INTRAMUSCULAR; INTRAVENOUS at 22:10

## 2018-01-25 RX ADMIN — ISOSORBIDE DINITRATE 1 MG: 10 TABLET ORAL at 21:36

## 2018-01-25 RX ADMIN — LISINOPRIL 1 MG: 20 TABLET ORAL at 09:00

## 2018-01-25 RX ADMIN — LABETALOL HCL 1 MG: 200 TABLET, FILM COATED ORAL at 21:32

## 2018-01-25 RX ADMIN — INSULIN ASPART 1 UNITS: 100 INJECTION, SOLUTION INTRAVENOUS; SUBCUTANEOUS at 12:00

## 2018-01-25 RX ADMIN — MORPHINE SULFATE 1 MG: 2 INJECTION, SOLUTION INTRAMUSCULAR; INTRAVENOUS at 16:08

## 2018-01-26 LAB
ADD MAN DIFF?: NO
ALBUMIN SERPL-MCNC: 2.8 G/DL (ref 3.4–5)
ALBUMIN/GLOB SERPL: 0.6 {RATIO} (ref 1–1.7)
ALP SERPL-CCNC: 82 U/L (ref 46–116)
ALT (SGPT): 36 U/L (ref 14–59)
ANION GAP SERPL CALC-SCNC: 20 MMOL/L (ref 6–14)
AST SERPL-CCNC: 28 U/L (ref 15–37)
BASE EXCESS ABG: 8 MMOL/L (ref -3–3)
BASO #: 0 X10^3/UL (ref 0–0.2)
BASO %: 0 % (ref 0–3)
BLOOD UREA NITROGEN: 79 MG/DL (ref 7–20)
BUN/CREAT SERPL: 14 (ref 6–20)
CALCIUM: 8.7 MG/DL (ref 8.5–10.1)
CHLORIDE: 95 MMOL/L (ref 98–107)
CO2 SERPL-SCNC: 20 MMOL/L (ref 21–32)
CREAT SERPL-MCNC: 5.8 MG/DL (ref 0.6–1)
EOS #: 0 X10^3/UL (ref 0–0.7)
EOS %: 0 % (ref 0–3)
FIO2 ABG: 40
GFR SERPLBLD BASED ON 1.73 SQ M-ARVRAT: 9.2 ML/MIN
GLOBULIN SER-MCNC: 4.6 G/DL (ref 2.2–3.8)
GLUCOSE SERPL-MCNC: 125 MG/DL (ref 70–99)
HCG SERPL-ACNC: 8.6 X10^3/UL (ref 4–11)
HCO3 ABG: 30 MMOL/L (ref 21–28)
HEMATOCRIT: 25.5 % (ref 36–47)
HEMOGLOBIN: 8.3 G/DL (ref 12–15.5)
LYMPH #: 0.8 X10^3/UL (ref 1–4.8)
LYMPH %: 9 % (ref 24–48)
MEAN CORPUSCULAR HEMOGLOBIN: 30 PG (ref 25–35)
MEAN CORPUSCULAR HGB CONC: 33 G/DL (ref 31–37)
MEAN CORPUSCULAR VOLUME: 92 FL (ref 79–100)
MONO #: 1.4 X10^3/UL (ref 0–1.1)
MONO %: 17 % (ref 0–9)
NEUT #: 6.3 X10^3UL (ref 1.8–7.7)
NEUT %: 74 % (ref 31–73)
PCO2 ABG: 34 MMHG (ref 35–46)
PH ABG: 7.56 (ref 7.35–7.45)
PLATELET COUNT: 329 X10^3/UL (ref 140–400)
PO2 ABG: 147 MMHG (ref 75–108)
POC GLUCOSE: 121 MG/DL (ref 70–99)
POC GLUCOSE: 124 MG/DL (ref 70–99)
POC GLUCOSE: 128 MG/DL (ref 70–99)
POTASSIUM SERPL-SCNC: 3.5 MMOL/L (ref 3.5–5.1)
RED BLOOD COUNT: 2.76 X10^6/UL (ref 3.5–5.4)
RED CELL DISTRIBUTION WIDTH: 14.6 % (ref 11.5–14.5)
SAT O2 ABG: 99 % (ref 92–99)
SODIUM: 135 MMOL/L (ref 136–145)
TOTAL BILIRUBIN: 0.6 MG/DL (ref 0.2–1)
TOTAL PROTEIN: 7.4 G/DL (ref 6.4–8.2)

## 2018-01-26 RX ADMIN — LABETALOL HCL 1 MG: 200 TABLET, FILM COATED ORAL at 11:56

## 2018-01-26 RX ADMIN — PANTOPRAZOLE SODIUM 1 MG: 40 INJECTION, POWDER, FOR SOLUTION INTRAVENOUS at 11:59

## 2018-01-26 RX ADMIN — METHYLPREDNISOLONE SODIUM SUCCINATE 1 MG: 40 INJECTION, POWDER, FOR SOLUTION INTRAMUSCULAR; INTRAVENOUS at 16:15

## 2018-01-26 RX ADMIN — PIPERACILLIN SODIUM AND TAZOBACTAM SODIUM 1 MLS/HR: 2; .25 INJECTION, POWDER, LYOPHILIZED, FOR SOLUTION INTRAVENOUS at 05:50

## 2018-01-26 RX ADMIN — LABETALOL HCL 1 MG: 200 TABLET, FILM COATED ORAL at 21:05

## 2018-01-26 RX ADMIN — METHYLPREDNISOLONE SODIUM SUCCINATE 1 MG: 40 INJECTION, POWDER, FOR SOLUTION INTRAMUSCULAR; INTRAVENOUS at 05:50

## 2018-01-26 RX ADMIN — ISOSORBIDE DINITRATE 1 MG: 10 TABLET ORAL at 16:15

## 2018-01-26 RX ADMIN — CHLORHEXIDINE GLUCONATE 1 ML: 1.2 RINSE ORAL at 21:00

## 2018-01-26 RX ADMIN — INSULIN ASPART 1 UNITS: 100 INJECTION, SOLUTION INTRAVENOUS; SUBCUTANEOUS at 12:00

## 2018-01-26 RX ADMIN — PROPOFOL 1 MLS/HR: 10 INJECTION, EMULSION INTRAVENOUS at 23:33

## 2018-01-26 RX ADMIN — CHLORHEXIDINE GLUCONATE 1 ML: 1.2 RINSE ORAL at 09:00

## 2018-01-26 RX ADMIN — INSULIN ASPART 1 UNITS: 100 INJECTION, SOLUTION INTRAVENOUS; SUBCUTANEOUS at 05:55

## 2018-01-26 RX ADMIN — PIPERACILLIN SODIUM AND TAZOBACTAM SODIUM 1 MLS/HR: 2; .25 INJECTION, POWDER, LYOPHILIZED, FOR SOLUTION INTRAVENOUS at 14:00

## 2018-01-26 RX ADMIN — ISOSORBIDE DINITRATE 1 MG: 10 TABLET ORAL at 11:58

## 2018-01-26 RX ADMIN — METHYLPREDNISOLONE SODIUM SUCCINATE 1 MG: 40 INJECTION, POWDER, FOR SOLUTION INTRAMUSCULAR; INTRAVENOUS at 21:06

## 2018-01-26 RX ADMIN — LACOSAMIDE 1 MLS/HR: 10 INJECTION INTRAVENOUS at 21:02

## 2018-01-26 RX ADMIN — PIPERACILLIN SODIUM AND TAZOBACTAM SODIUM 1 MLS/HR: 2; .25 INJECTION, POWDER, LYOPHILIZED, FOR SOLUTION INTRAVENOUS at 21:07

## 2018-01-26 RX ADMIN — INSULIN ASPART 1 UNITS: 100 INJECTION, SOLUTION INTRAVENOUS; SUBCUTANEOUS at 18:00

## 2018-01-26 RX ADMIN — ISOSORBIDE DINITRATE 1 MG: 10 TABLET ORAL at 21:04

## 2018-01-26 RX ADMIN — LACOSAMIDE 1 MLS/HR: 10 INJECTION INTRAVENOUS at 11:58

## 2018-01-26 RX ADMIN — PROPOFOL 1 MLS/HR: 10 INJECTION, EMULSION INTRAVENOUS at 06:42

## 2018-01-27 LAB
ADD MAN DIFF?: NO
ALBUMIN SERPL-MCNC: 2.6 G/DL (ref 3.4–5)
ALBUMIN/GLOB SERPL: 0.6 {RATIO} (ref 1–1.7)
ALP SERPL-CCNC: 81 U/L (ref 46–116)
ALT (SGPT): 34 U/L (ref 14–59)
ANION GAP SERPL CALC-SCNC: 14 MMOL/L (ref 6–14)
AST SERPL-CCNC: 27 U/L (ref 15–37)
BASE EXCESS ABG: 2 MMOL/L (ref -3–3)
BASO #: 0 X10^3/UL (ref 0–0.2)
BASO %: 0 % (ref 0–3)
BLOOD UREA NITROGEN: 49 MG/DL (ref 7–20)
BUN/CREAT SERPL: 12 (ref 6–20)
CALCIUM: 8.6 MG/DL (ref 8.5–10.1)
CHLORIDE: 94 MMOL/L (ref 98–107)
CO2 SERPL-SCNC: 27 MMOL/L (ref 21–32)
CREAT SERPL-MCNC: 4.1 MG/DL (ref 0.6–1)
EOS #: 0 X10^3/UL (ref 0–0.7)
EOS %: 0 % (ref 0–3)
FIO2 ABG: 40
GFR SERPLBLD BASED ON 1.73 SQ M-ARVRAT: 13.7 ML/MIN
GLOBULIN SER-MCNC: 4.4 G/DL (ref 2.2–3.8)
GLUCOSE SERPL-MCNC: 122 MG/DL (ref 70–99)
HCG SERPL-ACNC: 9.1 X10^3/UL (ref 4–11)
HCO3 ABG: 26 MMOL/L (ref 21–28)
HEMATOCRIT: 25.9 % (ref 36–47)
HEMOGLOBIN: 8.7 G/DL (ref 12–15.5)
LYMPH #: 1.2 X10^3/UL (ref 1–4.8)
LYMPH %: 13 % (ref 24–48)
MEAN CORPUSCULAR HEMOGLOBIN: 31 PG (ref 25–35)
MEAN CORPUSCULAR HGB CONC: 34 G/DL (ref 31–37)
MEAN CORPUSCULAR VOLUME: 92 FL (ref 79–100)
MONO #: 1.4 X10^3/UL (ref 0–1.1)
MONO %: 15 % (ref 0–9)
NEUT #: 6.5 X10^3UL (ref 1.8–7.7)
NEUT %: 71 % (ref 31–73)
PCO2 ABG: 38 MMHG (ref 35–46)
PLATELET COUNT: 332 X10^3/UL (ref 140–400)
POC GLUCOSE: 109 MG/DL (ref 70–99)
POC GLUCOSE: 111 MG/DL (ref 70–99)
POC GLUCOSE: 118 MG/DL (ref 70–99)
POC GLUCOSE: 125 MG/DL (ref 70–99)
POC GLUCOSE: 129 MG/DL (ref 70–99)
POTASSIUM SERPL-SCNC: 3 MMOL/L (ref 3.5–5.1)
RED BLOOD COUNT: 2.8 X10^6/UL (ref 3.5–5.4)
RED CELL DISTRIBUTION WIDTH: 14.7 % (ref 11.5–14.5)
SAT O2 ABG: 98 % (ref 92–99)
SODIUM: 135 MMOL/L (ref 136–145)
TOTAL BILIRUBIN: 0.8 MG/DL (ref 0.2–1)
TOTAL PROTEIN: 7 G/DL (ref 6.4–8.2)

## 2018-01-27 RX ADMIN — CHLORHEXIDINE GLUCONATE 1 ML: 1.2 RINSE ORAL at 08:27

## 2018-01-27 RX ADMIN — PIPERACILLIN SODIUM AND TAZOBACTAM SODIUM 1 MLS/HR: 2; .25 INJECTION, POWDER, LYOPHILIZED, FOR SOLUTION INTRAVENOUS at 14:01

## 2018-01-27 RX ADMIN — ISOSORBIDE DINITRATE 1 MG: 10 TABLET ORAL at 13:45

## 2018-01-27 RX ADMIN — LABETALOL HCL 1 MG: 200 TABLET, FILM COATED ORAL at 08:27

## 2018-01-27 RX ADMIN — MORPHINE SULFATE 1 MG: 2 INJECTION, SOLUTION INTRAMUSCULAR; INTRAVENOUS at 22:07

## 2018-01-27 RX ADMIN — LACOSAMIDE 1 MLS/HR: 10 INJECTION INTRAVENOUS at 21:31

## 2018-01-27 RX ADMIN — METHYLPREDNISOLONE SODIUM SUCCINATE 1 MG: 40 INJECTION, POWDER, FOR SOLUTION INTRAMUSCULAR; INTRAVENOUS at 14:01

## 2018-01-27 RX ADMIN — ISOSORBIDE DINITRATE 1 MG: 10 TABLET ORAL at 08:28

## 2018-01-27 RX ADMIN — METHYLPREDNISOLONE SODIUM SUCCINATE 1 MG: 40 INJECTION, POWDER, FOR SOLUTION INTRAMUSCULAR; INTRAVENOUS at 21:32

## 2018-01-27 RX ADMIN — INSULIN ASPART 1 UNITS: 100 INJECTION, SOLUTION INTRAVENOUS; SUBCUTANEOUS at 12:00

## 2018-01-27 RX ADMIN — INSULIN ASPART 1 UNITS: 100 INJECTION, SOLUTION INTRAVENOUS; SUBCUTANEOUS at 00:00

## 2018-01-27 RX ADMIN — ISOSORBIDE DINITRATE 1 MG: 10 TABLET ORAL at 19:58

## 2018-01-27 RX ADMIN — LACOSAMIDE 1 MLS/HR: 10 INJECTION INTRAVENOUS at 09:48

## 2018-01-27 RX ADMIN — PANTOPRAZOLE SODIUM 1 MG: 40 INJECTION, POWDER, FOR SOLUTION INTRAVENOUS at 08:27

## 2018-01-27 RX ADMIN — LABETALOL HCL 1 MG: 200 TABLET, FILM COATED ORAL at 19:58

## 2018-01-27 RX ADMIN — PIPERACILLIN SODIUM AND TAZOBACTAM SODIUM 1 MLS/HR: 2; .25 INJECTION, POWDER, LYOPHILIZED, FOR SOLUTION INTRAVENOUS at 05:18

## 2018-01-27 RX ADMIN — INSULIN ASPART 1 UNITS: 100 INJECTION, SOLUTION INTRAVENOUS; SUBCUTANEOUS at 05:19

## 2018-01-27 RX ADMIN — METHYLPREDNISOLONE SODIUM SUCCINATE 1 MG: 40 INJECTION, POWDER, FOR SOLUTION INTRAMUSCULAR; INTRAVENOUS at 05:18

## 2018-01-27 RX ADMIN — INSULIN ASPART 1 UNITS: 100 INJECTION, SOLUTION INTRAVENOUS; SUBCUTANEOUS at 23:51

## 2018-01-27 RX ADMIN — PIPERACILLIN SODIUM AND TAZOBACTAM SODIUM 1 MLS/HR: 2; .25 INJECTION, POWDER, LYOPHILIZED, FOR SOLUTION INTRAVENOUS at 21:32

## 2018-01-27 RX ADMIN — INSULIN ASPART 1 UNITS: 100 INJECTION, SOLUTION INTRAVENOUS; SUBCUTANEOUS at 18:00

## 2018-01-27 RX ADMIN — PROPOFOL 1 MLS/HR: 10 INJECTION, EMULSION INTRAVENOUS at 05:19

## 2018-01-27 RX ADMIN — CLONIDINE 1 PATCH: 0.3 PATCH TRANSDERMAL at 10:50

## 2018-01-28 LAB
ALBUMIN SERPL-MCNC: 2.6 G/DL (ref 3.4–5)
ALBUMIN/GLOB SERPL: 0.6 {RATIO} (ref 1–1.7)
ALP SERPL-CCNC: 78 U/L (ref 46–116)
ALT (SGPT): 32 U/L (ref 14–59)
ANION GAP SERPL CALC-SCNC: 15 MMOL/L (ref 6–14)
AST SERPL-CCNC: 23 U/L (ref 15–37)
BLOOD UREA NITROGEN: 62 MG/DL (ref 7–20)
BUN/CREAT SERPL: 11 (ref 6–20)
CALCIUM: 8.8 MG/DL (ref 8.5–10.1)
CHLORIDE: 101 MMOL/L (ref 98–107)
CO2 SERPL-SCNC: 24 MMOL/L (ref 21–32)
CREAT SERPL-MCNC: 5.6 MG/DL (ref 0.6–1)
GFR SERPLBLD BASED ON 1.73 SQ M-ARVRAT: 9.6 ML/MIN
GLOBULIN SER-MCNC: 4.2 G/DL (ref 2.2–3.8)
GLUCOSE SERPL-MCNC: 101 MG/DL (ref 70–99)
PH ABG: 7.46 (ref 7.35–7.45)
PO2 ABG: 130 MMHG (ref 75–108)
POC GLUCOSE: 128 MG/DL (ref 70–99)
POC GLUCOSE: 94 MG/DL (ref 70–99)
POTASSIUM SERPL-SCNC: 2.9 MMOL/L (ref 3.5–5.1)
SODIUM: 140 MMOL/L (ref 136–145)
TOTAL BILIRUBIN: 0.7 MG/DL (ref 0.2–1)
TOTAL PROTEIN: 6.8 G/DL (ref 6.4–8.2)

## 2018-01-28 RX ADMIN — LABETALOL HCL 1 MG: 200 TABLET, FILM COATED ORAL at 09:00

## 2018-01-28 RX ADMIN — LACOSAMIDE 1 MLS/HR: 10 INJECTION INTRAVENOUS at 09:44

## 2018-01-28 RX ADMIN — HALOPERIDOL LACTATE 1 MG: 5 INJECTION, SOLUTION INTRAMUSCULAR at 23:47

## 2018-01-28 RX ADMIN — SCOPOLAMINE 1 PATCH: 1 PATCH, EXTENDED RELEASE TRANSDERMAL at 09:44

## 2018-01-28 RX ADMIN — METOPROLOL TARTRATE 1 MG: 5 INJECTION INTRAVENOUS at 23:18

## 2018-01-28 RX ADMIN — PANTOPRAZOLE SODIUM 1 MG: 40 INJECTION, POWDER, FOR SOLUTION INTRAVENOUS at 09:44

## 2018-01-28 RX ADMIN — LABETALOL HYDROCHLORIDE 1 MG: 5 INJECTION, SOLUTION INTRAVENOUS at 22:12

## 2018-01-28 RX ADMIN — INSULIN ASPART 1 UNITS: 100 INJECTION, SOLUTION INTRAVENOUS; SUBCUTANEOUS at 11:44

## 2018-01-28 RX ADMIN — POTASSIUM CHLORIDE 1 MLS/HR: 400 INJECTION, SOLUTION INTRAVENOUS at 09:45

## 2018-01-28 RX ADMIN — PIPERACILLIN SODIUM AND TAZOBACTAM SODIUM 1 MLS/HR: 2; .25 INJECTION, POWDER, LYOPHILIZED, FOR SOLUTION INTRAVENOUS at 05:19

## 2018-01-28 RX ADMIN — INSULIN ASPART 1 UNITS: 100 INJECTION, SOLUTION INTRAVENOUS; SUBCUTANEOUS at 14:20

## 2018-01-28 RX ADMIN — LACOSAMIDE 1 MLS/HR: 10 INJECTION INTRAVENOUS at 21:09

## 2018-01-28 RX ADMIN — LABETALOL HCL 1 MG: 200 TABLET, FILM COATED ORAL at 21:00

## 2018-01-28 RX ADMIN — MORPHINE SULFATE 1 MG: 2 INJECTION, SOLUTION INTRAMUSCULAR; INTRAVENOUS at 19:45

## 2018-01-28 RX ADMIN — ISOSORBIDE DINITRATE 1 MG: 10 TABLET ORAL at 14:00

## 2018-01-28 RX ADMIN — ISOSORBIDE DINITRATE 1 MG: 10 TABLET ORAL at 09:00

## 2018-01-28 RX ADMIN — POTASSIUM CHLORIDE 1 MLS/HR: 2 INJECTION, SOLUTION, CONCENTRATE INTRAVENOUS at 07:30

## 2018-01-28 RX ADMIN — HYDRALAZINE HYDROCHLORIDE 1 MG: 20 INJECTION INTRAMUSCULAR; INTRAVENOUS at 09:44

## 2018-01-28 RX ADMIN — INSULIN ASPART 1 UNITS: 100 INJECTION, SOLUTION INTRAVENOUS; SUBCUTANEOUS at 05:36

## 2018-01-28 RX ADMIN — ISOSORBIDE DINITRATE 1 MG: 10 TABLET ORAL at 21:00

## 2018-01-28 RX ADMIN — METHYLPREDNISOLONE SODIUM SUCCINATE 1 MG: 40 INJECTION, POWDER, FOR SOLUTION INTRAMUSCULAR; INTRAVENOUS at 05:20

## 2018-01-28 RX ADMIN — HYDRALAZINE HYDROCHLORIDE 1 MG: 20 INJECTION INTRAMUSCULAR; INTRAVENOUS at 21:37

## 2018-01-29 LAB
ALBUMIN SERPL-MCNC: 2.9 G/DL (ref 3.4–5)
ALBUMIN/GLOB SERPL: 0.6 {RATIO} (ref 1–1.7)
ALP SERPL-CCNC: 105 U/L (ref 46–116)
ALT (SGPT): 40 U/L (ref 14–59)
ANION GAP SERPL CALC-SCNC: 20 MMOL/L (ref 6–14)
AST SERPL-CCNC: 29 U/L (ref 15–37)
BASE EXCESS ABG: -4 MMOL/L (ref -3–3)
BASE EXCESS ABG: -7 MMOL/L (ref -3–3)
BLOOD UREA NITROGEN: 80 MG/DL (ref 7–20)
BUN/CREAT SERPL: 11 (ref 6–20)
CALCIUM: 9.2 MG/DL (ref 8.5–10.1)
CHLORIDE: 105 MMOL/L (ref 98–107)
CO2 SERPL-SCNC: 19 MMOL/L (ref 21–32)
CREAT SERPL-MCNC: 7.2 MG/DL (ref 0.6–1)
FIO2 ABG: 32
FIO2 ABG: 50
GFR SERPLBLD BASED ON 1.73 SQ M-ARVRAT: 7.2 ML/MIN
GLOBULIN SER-MCNC: 4.9 G/DL (ref 2.2–3.8)
GLUCOSE SERPL-MCNC: 138 MG/DL (ref 70–99)
HCO3 ABG: 17 MMOL/L (ref 21–28)
HCO3 ABG: 20 MMOL/L (ref 21–28)
PCO2 ABG: 29 MMHG (ref 35–46)
PCO2 ABG: 32 MMHG (ref 35–46)
PH ABG: 7.39 (ref 7.35–7.45)
PH ABG: 7.41 (ref 7.35–7.45)
PO2 ABG: 108 MMHG (ref 75–108)
PO2 ABG: 68 MMHG (ref 75–108)
POC GLUCOSE: 117 MG/DL (ref 70–99)
POC GLUCOSE: 120 MG/DL (ref 70–99)
POC GLUCOSE: 134 MG/DL (ref 70–99)
POC GLUCOSE: 163 MG/DL (ref 70–99)
POC GLUCOSE: 79 MG/DL (ref 70–99)
POTASSIUM SERPL-SCNC: 3.5 MMOL/L (ref 3.5–5.1)
SAT O2 ABG: 91 % (ref 92–99)
SAT O2 ABG: 97 % (ref 92–99)
SODIUM: 144 MMOL/L (ref 136–145)
TOTAL BILIRUBIN: 0.9 MG/DL (ref 0.2–1)
TOTAL PROTEIN: 7.8 G/DL (ref 6.4–8.2)

## 2018-01-29 RX ADMIN — METOPROLOL TARTRATE 1 MG: 5 INJECTION INTRAVENOUS at 12:29

## 2018-01-29 RX ADMIN — LABETALOL HYDROCHLORIDE 1 MG: 5 INJECTION, SOLUTION INTRAVENOUS at 02:34

## 2018-01-29 RX ADMIN — ISOSORBIDE DINITRATE 1 MG: 10 TABLET ORAL at 08:23

## 2018-01-29 RX ADMIN — LABETALOL HCL 1 MG: 200 TABLET, FILM COATED ORAL at 21:00

## 2018-01-29 RX ADMIN — PIPERACILLIN SODIUM AND TAZOBACTAM SODIUM 1 MLS/HR: 2; .25 INJECTION, POWDER, LYOPHILIZED, FOR SOLUTION INTRAVENOUS at 21:15

## 2018-01-29 RX ADMIN — HALOPERIDOL LACTATE 1 MG: 5 INJECTION, SOLUTION INTRAMUSCULAR at 21:36

## 2018-01-29 RX ADMIN — METOPROLOL TARTRATE 1 MG: 5 INJECTION INTRAVENOUS at 16:39

## 2018-01-29 RX ADMIN — PIPERACILLIN SODIUM AND TAZOBACTAM SODIUM 1 MLS/HR: 2; .25 INJECTION, POWDER, LYOPHILIZED, FOR SOLUTION INTRAVENOUS at 16:49

## 2018-01-29 RX ADMIN — INSULIN ASPART 1 UNITS: 100 INJECTION, SOLUTION INTRAVENOUS; SUBCUTANEOUS at 06:00

## 2018-01-29 RX ADMIN — INSULIN ASPART 1 UNITS: 100 INJECTION, SOLUTION INTRAVENOUS; SUBCUTANEOUS at 00:00

## 2018-01-29 RX ADMIN — INSULIN ASPART 1 UNITS: 100 INJECTION, SOLUTION INTRAVENOUS; SUBCUTANEOUS at 12:00

## 2018-01-29 RX ADMIN — ISOSORBIDE DINITRATE 1 MG: 10 TABLET ORAL at 21:00

## 2018-01-29 RX ADMIN — HALOPERIDOL LACTATE 1 MG: 5 INJECTION, SOLUTION INTRAMUSCULAR at 16:39

## 2018-01-29 RX ADMIN — HALOPERIDOL LACTATE 1 MG: 5 INJECTION, SOLUTION INTRAMUSCULAR at 12:58

## 2018-01-29 RX ADMIN — PANTOPRAZOLE SODIUM 1 MG: 40 INJECTION, POWDER, FOR SOLUTION INTRAVENOUS at 08:59

## 2018-01-29 RX ADMIN — LACOSAMIDE 1 MLS/HR: 10 INJECTION INTRAVENOUS at 21:15

## 2018-01-29 RX ADMIN — LACOSAMIDE 1 MLS/HR: 10 INJECTION INTRAVENOUS at 08:58

## 2018-01-29 RX ADMIN — METHYLPREDNISOLONE SODIUM SUCCINATE 1 MG: 40 INJECTION, POWDER, FOR SOLUTION INTRAMUSCULAR; INTRAVENOUS at 08:57

## 2018-01-29 RX ADMIN — ISOSORBIDE DINITRATE 1 MG: 10 TABLET ORAL at 14:00

## 2018-01-29 RX ADMIN — LABETALOL HCL 1 MG: 200 TABLET, FILM COATED ORAL at 08:22

## 2018-01-29 RX ADMIN — HYDRALAZINE HYDROCHLORIDE 1 MG: 20 INJECTION INTRAMUSCULAR; INTRAVENOUS at 21:33

## 2018-01-29 RX ADMIN — HALOPERIDOL LACTATE 1 MG: 5 INJECTION, SOLUTION INTRAMUSCULAR at 02:32

## 2018-01-29 RX ADMIN — HYDRALAZINE HYDROCHLORIDE 1 MG: 20 INJECTION INTRAMUSCULAR; INTRAVENOUS at 02:11

## 2018-01-29 RX ADMIN — INSULIN ASPART 1 UNITS: 100 INJECTION, SOLUTION INTRAVENOUS; SUBCUTANEOUS at 18:00

## 2018-01-30 LAB
ADD MAN DIFF?: NO
ANION GAP SERPL CALC-SCNC: 12 MMOL/L (ref 6–14)
BASO #: 0 X10^3/UL (ref 0–0.2)
BASO %: 0 % (ref 0–3)
BLOOD UREA NITROGEN: 38 MG/DL (ref 7–20)
CALCIUM: 9.5 MG/DL (ref 8.5–10.1)
CHLORIDE: 99 MMOL/L (ref 98–107)
CO2 SERPL-SCNC: 29 MMOL/L (ref 21–32)
CREAT SERPL-MCNC: 4.7 MG/DL (ref 0.6–1)
EOS #: 0 X10^3/UL (ref 0–0.7)
EOS %: 0 % (ref 0–3)
GFR SERPLBLD BASED ON 1.73 SQ M-ARVRAT: 11.7 ML/MIN
GLUCOSE SERPL-MCNC: 88 MG/DL (ref 70–99)
HCG SERPL-ACNC: 18 X10^3/UL (ref 4–11)
HEMATOCRIT: 27.9 % (ref 36–47)
HEMOGLOBIN: 8.8 G/DL (ref 12–15.5)
LYMPH #: 0.9 X10^3/UL (ref 1–4.8)
LYMPH %: 5 % (ref 24–48)
MEAN CORPUSCULAR HEMOGLOBIN: 30 PG (ref 25–35)
MEAN CORPUSCULAR HGB CONC: 32 G/DL (ref 31–37)
MEAN CORPUSCULAR VOLUME: 94 FL (ref 79–100)
MONO #: 1.8 X10^3/UL (ref 0–1.1)
MONO %: 10 % (ref 0–9)
NEUT #: 15.3 X10^3UL (ref 1.8–7.7)
NEUT %: 85 % (ref 31–73)
PLATELET COUNT: 303 X10^3/UL (ref 140–400)
POC GLUCOSE: 111 MG/DL (ref 70–99)
POC GLUCOSE: 115 MG/DL (ref 70–99)
POC GLUCOSE: 81 MG/DL (ref 70–99)
POTASSIUM SERPL-SCNC: 3.7 MMOL/L (ref 3.5–5.1)
RED BLOOD COUNT: 2.96 X10^6/UL (ref 3.5–5.4)
RED CELL DISTRIBUTION WIDTH: 15.8 % (ref 11.5–14.5)
SODIUM: 140 MMOL/L (ref 136–145)

## 2018-01-30 RX ADMIN — LABETALOL HYDROCHLORIDE 1 MG: 5 INJECTION, SOLUTION INTRAVENOUS at 01:42

## 2018-01-30 RX ADMIN — METHYLPREDNISOLONE SODIUM SUCCINATE 1 MG: 40 INJECTION, POWDER, FOR SOLUTION INTRAMUSCULAR; INTRAVENOUS at 09:25

## 2018-01-30 RX ADMIN — HALOPERIDOL LACTATE 1 MG: 5 INJECTION, SOLUTION INTRAMUSCULAR at 01:41

## 2018-01-30 RX ADMIN — HYDRALAZINE HYDROCHLORIDE 1 MG: 20 INJECTION INTRAMUSCULAR; INTRAVENOUS at 05:51

## 2018-01-30 RX ADMIN — INSULIN ASPART 1 UNITS: 100 INJECTION, SOLUTION INTRAVENOUS; SUBCUTANEOUS at 18:00

## 2018-01-30 RX ADMIN — PIPERACILLIN SODIUM AND TAZOBACTAM SODIUM 1 MLS/HR: 2; .25 INJECTION, POWDER, LYOPHILIZED, FOR SOLUTION INTRAVENOUS at 06:17

## 2018-01-30 RX ADMIN — PIPERACILLIN SODIUM AND TAZOBACTAM SODIUM 1 MLS/HR: 2; .25 INJECTION, POWDER, LYOPHILIZED, FOR SOLUTION INTRAVENOUS at 15:49

## 2018-01-30 RX ADMIN — INSULIN ASPART 1 UNITS: 100 INJECTION, SOLUTION INTRAVENOUS; SUBCUTANEOUS at 11:07

## 2018-01-30 RX ADMIN — ISOSORBIDE DINITRATE 1 MG: 10 TABLET ORAL at 15:49

## 2018-01-30 RX ADMIN — INSULIN ASPART 1 UNITS: 100 INJECTION, SOLUTION INTRAVENOUS; SUBCUTANEOUS at 00:00

## 2018-01-30 RX ADMIN — LACOSAMIDE 1 MLS/HR: 10 INJECTION INTRAVENOUS at 09:24

## 2018-01-30 RX ADMIN — BARIUM SULFATE 1 GM: 0.81 POWDER, FOR SUSPENSION ORAL at 14:00

## 2018-01-30 RX ADMIN — LABETALOL HCL 1 MG: 200 TABLET, FILM COATED ORAL at 21:22

## 2018-01-30 RX ADMIN — NITROGLYCERIN 1 PATCH: 60 PATCH TRANSDERMAL at 13:15

## 2018-01-30 RX ADMIN — LABETALOL HCL 1 MG: 200 TABLET, FILM COATED ORAL at 09:00

## 2018-01-30 RX ADMIN — Medication 1 APP: at 11:05

## 2018-01-30 RX ADMIN — LACOSAMIDE 1 MLS/HR: 10 INJECTION INTRAVENOUS at 21:05

## 2018-01-30 RX ADMIN — FLUTICASONE PROPIONATE 1 SPRAY: 50 SPRAY, METERED NASAL at 11:06

## 2018-01-30 RX ADMIN — ISOSORBIDE DINITRATE 1 MG: 10 TABLET ORAL at 09:00

## 2018-01-30 RX ADMIN — ISOSORBIDE DINITRATE 1 MG: 10 TABLET ORAL at 21:23

## 2018-01-30 RX ADMIN — PANTOPRAZOLE SODIUM 1 MG: 40 INJECTION, POWDER, FOR SOLUTION INTRAVENOUS at 09:24

## 2018-01-30 RX ADMIN — INSULIN ASPART 1 UNITS: 100 INJECTION, SOLUTION INTRAVENOUS; SUBCUTANEOUS at 06:00

## 2018-01-31 LAB
ADD MAN DIFF?: NO
ANION GAP SERPL CALC-SCNC: 15 MMOL/L (ref 6–14)
BASO #: 0 X10^3/UL (ref 0–0.2)
BASO %: 0 % (ref 0–3)
BLOOD UREA NITROGEN: 57 MG/DL (ref 7–20)
CALCIUM: 9.8 MG/DL (ref 8.5–10.1)
CHLORIDE: 104 MMOL/L (ref 98–107)
CO2 SERPL-SCNC: 26 MMOL/L (ref 21–32)
CREAT SERPL-MCNC: 6.9 MG/DL (ref 0.6–1)
EOS #: 0 X10^3/UL (ref 0–0.7)
EOS %: 0 % (ref 0–3)
GFR SERPLBLD BASED ON 1.73 SQ M-ARVRAT: 7.5 ML/MIN
GLUCOSE SERPL-MCNC: 110 MG/DL (ref 70–99)
HCG SERPL-ACNC: 11.5 X10^3/UL (ref 4–11)
HEMATOCRIT: 23.2 % (ref 36–47)
HEMOGLOBIN: 7.3 G/DL (ref 12–15.5)
LYMPH #: 1 X10^3/UL (ref 1–4.8)
LYMPH %: 9 % (ref 24–48)
MEAN CORPUSCULAR HEMOGLOBIN: 30 PG (ref 25–35)
MEAN CORPUSCULAR HGB CONC: 32 G/DL (ref 31–37)
MEAN CORPUSCULAR VOLUME: 95 FL (ref 79–100)
MONO #: 1.5 X10^3/UL (ref 0–1.1)
MONO %: 13 % (ref 0–9)
NEUT #: 8.9 X10^3UL (ref 1.8–7.7)
NEUT %: 78 % (ref 31–73)
PLATELET COUNT: 229 X10^3/UL (ref 140–400)
POC GLUCOSE: 103 MG/DL (ref 70–99)
POC GLUCOSE: 113 MG/DL (ref 70–99)
POC GLUCOSE: 69 MG/DL (ref 70–99)
POTASSIUM SERPL-SCNC: 3.8 MMOL/L (ref 3.5–5.1)
RED BLOOD COUNT: 2.44 X10^6/UL (ref 3.5–5.4)
RED CELL DISTRIBUTION WIDTH: 16.3 % (ref 11.5–14.5)
SODIUM: 145 MMOL/L (ref 136–145)

## 2018-01-31 RX ADMIN — INSULIN ASPART 1 UNITS: 100 INJECTION, SOLUTION INTRAVENOUS; SUBCUTANEOUS at 12:00

## 2018-01-31 RX ADMIN — SCOPOLAMINE 1 PATCH: 1 PATCH, EXTENDED RELEASE TRANSDERMAL at 13:29

## 2018-01-31 RX ADMIN — ISOSORBIDE DINITRATE 1 MG: 10 TABLET ORAL at 13:28

## 2018-01-31 RX ADMIN — INSULIN ASPART 1 UNITS: 100 INJECTION, SOLUTION INTRAVENOUS; SUBCUTANEOUS at 00:00

## 2018-01-31 RX ADMIN — PIPERACILLIN SODIUM AND TAZOBACTAM SODIUM 1 MLS/HR: 2; .25 INJECTION, POWDER, LYOPHILIZED, FOR SOLUTION INTRAVENOUS at 00:39

## 2018-01-31 RX ADMIN — METHYLPREDNISOLONE SODIUM SUCCINATE 1 MG: 40 INJECTION, POWDER, FOR SOLUTION INTRAMUSCULAR; INTRAVENOUS at 13:27

## 2018-01-31 RX ADMIN — ISOSORBIDE DINITRATE 1 MG: 10 TABLET ORAL at 14:00

## 2018-01-31 RX ADMIN — LABETALOL HCL 1 MG: 200 TABLET, FILM COATED ORAL at 13:29

## 2018-01-31 RX ADMIN — FLUTICASONE PROPIONATE 1 SPRAY: 50 SPRAY, METERED NASAL at 13:31

## 2018-01-31 RX ADMIN — LACOSAMIDE 1 MLS/HR: 10 INJECTION INTRAVENOUS at 09:00

## 2018-01-31 RX ADMIN — PIPERACILLIN SODIUM AND TAZOBACTAM SODIUM 1 MLS/HR: 2; .25 INJECTION, POWDER, LYOPHILIZED, FOR SOLUTION INTRAVENOUS at 05:56

## 2018-01-31 RX ADMIN — INSULIN ASPART 1 UNITS: 100 INJECTION, SOLUTION INTRAVENOUS; SUBCUTANEOUS at 05:56

## 2018-01-31 RX ADMIN — PANTOPRAZOLE SODIUM 1 MG: 40 INJECTION, POWDER, FOR SOLUTION INTRAVENOUS at 13:27

## 2018-04-26 NOTE — PDOC
Dialysis Progress Note


Dialysis Note


Dialysis Note


Seen on Hemodialysis, tolerating treatment       Well so far





Vitals on Hemodialysis: 151/74      66        afeb





 





 


General Appearance:          


Awake:          


Alert Oriented  x      3


Neck:    No JVD or JVP


Chest:    CTA Kit


Heart:    S1 S2


Abdomen -    Soft NTND


Extremities -    No Edema











ESRD:   Dialysis as below





F 180 NR  3.0  Hrs





4K for 1 hr then 3 K 2.5 Ca 140 Na  35 HC03





Qb 350 + Qd 500+





Heparin  0 Units





Uf 1-2kg  <  dry weight as tolerated





May give 25-50 gms of 25% Albumin if needed to maintain Hemodynamic stability





Treatment plan reviewed and discussed with Dialysis RN





Vitals


Vital Signs





 Vital Signs








  Date Time  Temp Pulse Resp B/P Pulse Ox O2 Delivery O2 Flow Rate FiO2


 


1/19/17 08:04  65 18 151/74 98 Room Air  


 


1/19/17 07:23       2.0 


 


1/19/17 07:00 98.0       





 98.0       











Labs


Last Labs





Laboratory Tests








Test


  1/18/17


00:57 1/18/17


01:12 1/18/17


01:22 1/18/17


04:10


 


Influenza Type A Antigen


  Negative


(NEGATIVE) 


  


  


 


 


Influenza Type B Antigen


  Negative


(NEGATIVE) 


  


  


 


 


White Blood Count


  


  5.7x10^3/uL


(4.0-11.0) 


  


 


 


Red Blood Count


  


  3.77x10^6/uL


(3.50-5.40) 


  


 


 


Hemoglobin


  


  11.5g/dL


(12.0-15.5) 


  


 


 


Hematocrit


  


  36.0%


(36.0-47.0) 


  


 


 


Mean Corpuscular Volume  95fL ()   


 


Mean Corpuscular Hemoglobin  31pg (25-35)   


 


Mean Corpuscular Hemoglobin


Concent 


  32g/dL (31-37) 


  


  


 


 


Red Cell Distribution Width


  


  14.2%


(11.5-14.5) 


  


 


 


Platelet Count


  


  342x10^3/uL


(140-400) 


  


 


 


Neutrophils (%) (Auto)  76% (31-73)   


 


Lymphocytes (%) (Auto)  16% (24-48)   


 


Monocytes (%) (Auto)  6% (0-9)   


 


Eosinophils (%) (Auto)  1% (0-3)   


 


Basophils (%) (Auto)  1% (0-3)   


 


Neutrophils # (Auto)


  


  4.3x10^3uL


(1.8-7.7) 


  


 


 


Lymphocytes # (Auto)


  


  0.9x10^3/uL


(1.0-4.8) 


  


 


 


Monocytes # (Auto)


  


  0.3x10^3/uL


(0.0-1.1) 


  


 


 


Eosinophils # (Auto)


  


  0.1x10^3/uL


(0.0-0.7) 


  


 


 


Basophils # (Auto)


  


  0.0x10^3/uL


(0.0-0.2) 


  


 


 


Sodium Level


  


  136mmol/L


(136-145) 


  


 


 


Potassium Level


  


  9.2mmol/L


(3.5-5.1) 


  


 


 


Chloride Level


  


  99mmol/L


() 


  


 


 


Carbon Dioxide Level


  


  20mmol/L


(21-32) 


  


 


 


Anion Gap  17 (6-14)   


 


Blood Urea Nitrogen


  


  145mg/dL


(7-20) 


  


 


 


Creatinine


  


  14.4mg/dL


(0.6-1.0) 


  


 


 


Estimated GFR


(Cockcroft-Gault) 


  3.2 


  


  


 


 


BUN/Creatinine Ratio  10 (6-20)   


 


Glucose Level


  


  137mg/dL


(70-99) 


  


 


 


Calcium Level


  


  9.3mg/dL


(8.5-10.1) 


  


 


 


Magnesium Level


  


  2.9mg/dL


(1.8-2.4) 


  


 


 


Total Bilirubin


  


  0.9mg/dL


(0.2-1.0) 


  


 


 


Aspartate Amino Transf


(AST/SGOT) 


  194U/L (15-37) 


  


  


 


 


Alanine Aminotransferase


(ALT/SGPT) 


  139U/L (14-59) 


  


  


 


 


Alkaline Phosphatase  93U/L ()   


 


Creatine Kinase  49U/L ()   


 


Creatine Kinase MB (Mass)


  


  1.2ng/mL


(0.0-3.6) 


  


 


 


Creatine Kinase MB Relative


Index 


  2.4% (0-4) 


  


  


 


 


Troponin I Quantitative


  


  < 0.017ng/mL


(0.000-0.055) 


  


 


 


NT-Pro-B-Type Natriuretic


Peptide 


  15326tr/mL


(0-124) 


  


 


 


Total Protein


  


  8.4g/dL


(6.4-8.2) 


  


 


 


Albumin


  


  3.1g/dL


(3.4-5.0) 


  


 


 


Albumin/Globulin Ratio  0.6 (1.0-1.7)   


 


Bedside Troponin I


  


  


  0.00ng/ml


(<0.08) 


 


 


Nasal Screen MRSA (PCR)


  


  


  


  Negative


(Negative)














Test


  1/18/17


08:03 1/19/17


05:02 1/19/17


05:22 


 


 


Sodium Level


  139mmol/L


(136-145) 


  138mmol/L


(136-145) 


 


 


Potassium Level


  3.0mmol/L


(3.5-5.1) 


  3.7mmol/L


(3.5-5.1) 


 


 


Chloride Level


  92mmol/L


() 


  94mmol/L


() 


 


 


Carbon Dioxide Level


  32mmol/L


(21-32) 


  32mmol/L


(21-32) 


 


 


Anion Gap 15 (6-14)   12 (6-14)  


 


Blood Urea Nitrogen 45mg/dL (7-20)   62mg/dL (7-20)  


 


Creatinine


  5.6mg/dL


(0.6-1.0) 


  9.0mg/dL


(0.6-1.0) 


 


 


Estimated GFR


(Cockcroft-Gault) 9.6 


  


  5.6 


  


 


 


Glucose Level


  66mg/dL


(70-99) 


  112mg/dL


(70-99) 


 


 


Calcium Level


  9.1mg/dL


(8.5-10.1) 


  8.6mg/dL


(8.5-10.1) 


 


 


White Blood Count


  


  4.1x10^3/uL


(4.0-11.0) 


  


 


 


Red Blood Count


  


  3.42x10^6/uL


(3.50-5.40) 


  


 


 


Hemoglobin


  


  10.5g/dL


(12.0-15.5) 


  


 


 


Hematocrit


  


  32.3%


(36.0-47.0) 


  


 


 


Mean Corpuscular Volume  94fL ()   


 


Mean Corpuscular Hemoglobin  31pg (25-35)   


 


Mean Corpuscular Hemoglobin


Concent 


  32g/dL (31-37) 


  


  


 


 


Red Cell Distribution Width


  


  14.1%


(11.5-14.5) 


  


 


 


Platelet Count


  


  262x10^3/uL


(140-400) 


  


 


 


Neutrophils (%) (Auto)  50% (31-73)   


 


Lymphocytes (%) (Auto)  28% (24-48)   


 


Monocytes (%) (Auto)  19% (0-9)   


 


Eosinophils (%) (Auto)  3% (0-3)   


 


Basophils (%) (Auto)  0% (0-3)   


 


Neutrophils # (Auto)


  


  2.0x10^3uL


(1.8-7.7) 


  


 


 


Lymphocytes # (Auto)


  


  1.1x10^3/uL


(1.0-4.8) 


  


 


 


Monocytes # (Auto)


  


  0.8x10^3/uL


(0.0-1.1) 


  


 


 


Eosinophils # (Auto)


  


  0.1x10^3/uL


(0.0-0.7) 


  


 


 


Basophils # (Auto)


  


  0.0x10^3/uL


(0.0-0.2) 


  


 


 


Phosphorus Level


  


  


  6.9mg/dL


(2.6-4.7) 


 


 


Magnesium Level


  


  


  2.0mg/dL


(1.8-2.4) 


 


 


Albumin


  


  


  2.9g/dL


(3.4-5.0) 


 








Laboratory Tests








Test


  1/19/17


05:02 1/19/17


05:22


 


White Blood Count


  4.1x10^3/uL


(4.0-11.0) 


 


 


Red Blood Count


  3.42x10^6/uL


(3.50-5.40) 


 


 


Hemoglobin


  10.5g/dL


(12.0-15.5) 


 


 


Hematocrit


  32.3%


(36.0-47.0) 


 


 


Mean Corpuscular Volume 94fL ()  


 


Mean Corpuscular Hemoglobin 31pg (25-35)  


 


Mean Corpuscular Hemoglobin


Concent 32g/dL (31-37) 


  


 


 


Red Cell Distribution Width


  14.1%


(11.5-14.5) 


 


 


Platelet Count


  262x10^3/uL


(140-400) 


 


 


Neutrophils (%) (Auto) 50% (31-73)  


 


Lymphocytes (%) (Auto) 28% (24-48)  


 


Monocytes (%) (Auto) 19% (0-9)  


 


Eosinophils (%) (Auto) 3% (0-3)  


 


Basophils (%) (Auto) 0% (0-3)  


 


Neutrophils # (Auto)


  2.0x10^3uL


(1.8-7.7) 


 


 


Lymphocytes # (Auto)


  1.1x10^3/uL


(1.0-4.8) 


 


 


Monocytes # (Auto)


  0.8x10^3/uL


(0.0-1.1) 


 


 


Eosinophils # (Auto)


  0.1x10^3/uL


(0.0-0.7) 


 


 


Basophils # (Auto)


  0.0x10^3/uL


(0.0-0.2) 


 


 


Sodium Level


  


  138mmol/L


(136-145)


 


Potassium Level


  


  3.7mmol/L


(3.5-5.1)


 


Chloride Level


  


  94mmol/L


()


 


Carbon Dioxide Level


  


  32mmol/L


(21-32)


 


Anion Gap  12 (6-14) 


 


Blood Urea Nitrogen  62mg/dL (7-20) 


 


Creatinine


  


  9.0mg/dL


(0.6-1.0)


 


Estimated GFR


(Cockcroft-Gault) 


  5.6 


 


 


Glucose Level


  


  112mg/dL


(70-99)


 


Calcium Level


  


  8.6mg/dL


(8.5-10.1)


 


Phosphorus Level


  


  6.9mg/dL


(2.6-4.7)


 


Magnesium Level


  


  2.0mg/dL


(1.8-2.4)


 


Albumin


  


  2.9g/dL


(3.4-5.0)











Assessment


Assessment


 Problems


Medical Problems:


(1) Bradycardia


Status: Acute  





(2) ESRD (end stage renal disease)


Status: Acute  





(3) Hyperkalemia


Status: Acute  








Problems:  





Plan


Plan of Care


 Problems


Medical Problems:


(1) Bradycardia


Status: Acute  





(2) ESRD (end stage renal disease)


Status: Acute  





(3) Hyperkalemia


Status: Acute  














CESARIO CHRISTOPHER MD Jan 19, 2017 08:12 Is This A New Presentation, Or A Follow-Up?: Skin Lesions Have Your Skin Lesions Been Treated?: been treated Which Family Member (Optional)?: Dad

## 2018-08-01 ENCOUNTER — HOSPITAL ENCOUNTER (OUTPATIENT)
Dept: HOSPITAL 61 - SPEC | Age: 53
Discharge: HOME | End: 2018-08-01
Payer: MEDICARE

## 2018-08-01 DIAGNOSIS — E78.5: ICD-10-CM

## 2018-08-01 DIAGNOSIS — Z86.2: ICD-10-CM

## 2018-08-01 DIAGNOSIS — I50.9: ICD-10-CM

## 2018-08-01 DIAGNOSIS — E83.51: Primary | ICD-10-CM

## 2018-08-01 DIAGNOSIS — I13.2: ICD-10-CM

## 2018-08-01 DIAGNOSIS — I25.10: ICD-10-CM

## 2018-08-01 DIAGNOSIS — E78.00: ICD-10-CM

## 2018-08-01 DIAGNOSIS — N18.5: ICD-10-CM

## 2018-08-01 LAB
ANION GAP SERPL CALC-SCNC: 16 MMOL/L (ref 6–14)
CHLORIDE: 101 MMOL/L (ref 98–107)
CO2 SERPL-SCNC: 19 MMOL/L (ref 21–32)
POTASSIUM SERPL-SCNC: 4.7 MMOL/L (ref 3.5–5.1)
SODIUM: 136 MMOL/L (ref 136–145)

## 2018-08-01 PROCEDURE — 80051 ELECTROLYTE PANEL: CPT

## 2018-08-01 PROCEDURE — 36415 COLL VENOUS BLD VENIPUNCTURE: CPT

## 2020-06-04 ENCOUNTER — HOSPITAL ENCOUNTER (EMERGENCY)
Dept: HOSPITAL 61 - ER | Age: 55
LOS: 1 days | Discharge: LEFT BEFORE DISCHARGE | End: 2020-06-05
Payer: COMMERCIAL

## 2020-06-04 VITALS — WEIGHT: 134.26 LBS | BODY MASS INDEX: 25.35 KG/M2 | HEIGHT: 61 IN
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
## 2020-06-04 DIAGNOSIS — M25.551: ICD-10-CM

## 2020-06-04 DIAGNOSIS — I13.0: ICD-10-CM
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
## 2020-06-04 DIAGNOSIS — F32.9: ICD-10-CM

## 2020-06-04 DIAGNOSIS — N18.9: ICD-10-CM

## 2020-06-04 DIAGNOSIS — M25.552: ICD-10-CM

## 2020-06-04 DIAGNOSIS — I50.9: ICD-10-CM

## 2020-06-04 DIAGNOSIS — Z98.51: ICD-10-CM

## 2020-06-04 DIAGNOSIS — K21.9: ICD-10-CM

## 2020-06-04 DIAGNOSIS — M54.9: ICD-10-CM

## 2020-06-04 DIAGNOSIS — I47.1: ICD-10-CM
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
## 2020-06-04 DIAGNOSIS — E78.00: ICD-10-CM

## 2020-06-04 DIAGNOSIS — Z88.6: ICD-10-CM

## 2020-06-04 DIAGNOSIS — F41.9: ICD-10-CM

## 2020-06-04 DIAGNOSIS — Z98.890: ICD-10-CM

## 2020-06-04 DIAGNOSIS — G89.11: Primary | ICD-10-CM

## 2020-06-04 DIAGNOSIS — I25.10: ICD-10-CM
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
## 2020-06-04 DIAGNOSIS — I10: ICD-10-CM

## 2020-06-04 PROCEDURE — 73521 X-RAY EXAM HIPS BI 2 VIEWS: CPT

## 2020-06-04 PROCEDURE — 96372 THER/PROPH/DIAG INJ SC/IM: CPT

## 2020-06-04 PROCEDURE — 99285 EMERGENCY DEPT VISIT HI MDM: CPT
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
## 2020-06-05 VITALS — DIASTOLIC BLOOD PRESSURE: 94 MMHG | SYSTOLIC BLOOD PRESSURE: 209 MMHG
